# Patient Record
Sex: FEMALE | Race: WHITE | Employment: UNEMPLOYED
[De-identification: names, ages, dates, MRNs, and addresses within clinical notes are randomized per-mention and may not be internally consistent; named-entity substitution may affect disease eponyms.]

---

## 2017-06-10 ENCOUNTER — HEALTH MAINTENANCE LETTER (OUTPATIENT)
Age: 27
End: 2017-06-10

## 2018-01-10 ENCOUNTER — OFFICE VISIT (OUTPATIENT)
Dept: FAMILY MEDICINE | Facility: CLINIC | Age: 28
End: 2018-01-10
Payer: COMMERCIAL

## 2018-01-10 VITALS
TEMPERATURE: 98.1 F | HEART RATE: 84 BPM | DIASTOLIC BLOOD PRESSURE: 60 MMHG | WEIGHT: 120 LBS | SYSTOLIC BLOOD PRESSURE: 102 MMHG | BODY MASS INDEX: 22.66 KG/M2 | HEIGHT: 61 IN | OXYGEN SATURATION: 99 %

## 2018-01-10 DIAGNOSIS — Z30.41 SURVEILLANCE OF PREVIOUSLY PRESCRIBED CONTRACEPTIVE PILL: ICD-10-CM

## 2018-01-10 DIAGNOSIS — E03.9 HYPOTHYROIDISM, UNSPECIFIED TYPE: ICD-10-CM

## 2018-01-10 DIAGNOSIS — Z00.00 ROUTINE GENERAL MEDICAL EXAMINATION AT A HEALTH CARE FACILITY: Primary | ICD-10-CM

## 2018-01-10 PROCEDURE — 84439 ASSAY OF FREE THYROXINE: CPT | Performed by: PHYSICIAN ASSISTANT

## 2018-01-10 PROCEDURE — 99385 PREV VISIT NEW AGE 18-39: CPT | Performed by: PHYSICIAN ASSISTANT

## 2018-01-10 PROCEDURE — 84443 ASSAY THYROID STIM HORMONE: CPT | Performed by: PHYSICIAN ASSISTANT

## 2018-01-10 PROCEDURE — 36415 COLL VENOUS BLD VENIPUNCTURE: CPT | Performed by: PHYSICIAN ASSISTANT

## 2018-01-10 RX ORDER — DESOGESTREL AND ETHINYL ESTRADIOL 21-5 (28)
1 KIT ORAL DAILY
Qty: 84 TABLET | Refills: 3 | Status: SHIPPED | OUTPATIENT
Start: 2018-01-10 | End: 2018-08-27

## 2018-01-10 RX ORDER — LEVOTHYROXINE SODIUM 88 UG/1
88 TABLET ORAL DAILY
Qty: 90 TABLET | Refills: 1 | COMMUNITY
Start: 2018-01-10 | End: 2018-01-10

## 2018-01-10 RX ORDER — LEVOTHYROXINE SODIUM 88 UG/1
88 TABLET ORAL DAILY
Qty: 90 TABLET | Refills: 3 | Status: SHIPPED | OUTPATIENT
Start: 2018-01-10 | End: 2018-08-27

## 2018-01-10 RX ORDER — DESOGESTREL AND ETHINYL ESTRADIOL 21-5 (28)
1 KIT ORAL DAILY
Qty: 84 TABLET | Refills: 3 | COMMUNITY
Start: 2018-01-10 | End: 2018-01-10

## 2018-01-10 NOTE — LETTER
[address] [address]  Phone: [phone]  Fax: [phone]  January 10, 2018     AUTHORIZATION TO RELEASE PROTECTED HEALTH INFORMATION    Patient Name:  Yary Muñoz  YOB: 1990    Sergey MRN:[id number]             This will authorize [facility]  to request information from :     [facility]     The following information is to be released for health maintenance and continuing care purposes with my primary care clinic:                 Pap Smear Report(most recent only)       When: 12/2016    -I understand that I may revoke this authorization by written request at any time to the address listed at the top of this form.  I understand that the revocation will not apply to information that has already been released in response to this authorization.    -This authorization last for one year after the date you sign it.     -[place] cannot prevent redisclosure of the information by the person or organization who receives your records under this authorization, and that information may not be covered by state and federal privacy protections after it is released. By signing this authorization, you release [facility] [place] from any and all liability resulting from a redisclosure by the recipient.    ___________________________________          _____________  Signature of Patient/Authorized Person                     Date        ____________________________________________  (Reason if patient is unable to sign)

## 2018-01-10 NOTE — Clinical Note
Please abstract the following data from this visit with this patient into the appropriate field in Epic:  Pap smear done on this date: 12/2016 (approximately), by this group: [facility], results were normal.  Q3Y

## 2018-01-10 NOTE — MR AVS SNAPSHOT
After Visit Summary   1/10/2018    Yary Muñoz    MRN: [id number]           Patient Information     Date Of Birth          1990        Visit Information        Provider Department      1/10/2018 11:40 AM Rubi Madison PA-C [facility]        Today's Diagnoses     Routine general medical examination at a health care facility    -  1    Hypothyroidism, unspecified type        Surveillance of previously prescribed contraceptive pill          Care Instructions      Preventive Health Recommendations  Female Ages 18 to 25     Yearly exam:     See your health care provider every year in order to  o Review health changes.   o Discuss preventive care.    o Review your medicines if your doctor has prescribed any.      You should be tested each year for STDs (sexually transmitted diseases).       After age 20, talk to your provider about how often you should have cholesterol testing.      Starting at age 21, get a Pap test every three years. If you have an abnormal result, your doctor may have you test more often.      If you are at risk for diabetes, you should have a diabetes test (fasting glucose).     Shots:     Get a flu shot each year.     Get a tetanus shot every 10 years.     Consider getting the shot (vaccine) that prevents cervical cancer (Gardasil).    Nutrition:     Eat at least 5 servings of fruits and vegetables each day.    Eat whole-grain bread, whole-wheat pasta and brown rice instead of white grains and rice.    Talk to your provider about Calcium and Vitamin D.     Lifestyle    Exercise at least 150 minutes a week each week (30 minutes a day, 5 days a week). This will help you control your weight and prevent disease.    Limit alcohol to one drink per day.    No smoking.     Wear sunscreen to prevent skin cancer.    See your dentist every six months for an exam and cleaning.          Follow-ups after your visit        Who to contact     If you have questions or need  "follow up information about today's clinic visit or your schedule please contact [facility] directly at [phone].  Normal or non-critical lab and imaging results will be communicated to you by MyChart, letter or phone within 4 business days after the clinic has received the results. If you do not hear from us within 7 days, please contact the clinic through Sidensehart or phone. If you have a critical or abnormal lab result, we will notify you by phone as soon as possible.  Submit refill requests through Grockit or call your pharmacy and they will forward the refill request to us. Please allow 3 business days for your refill to be completed.          Additional Information About Your Visit        SidenseharPalamida Information     Grockit lets you send messages to your doctor, view your test results, renew your prescriptions, schedule appointments and more. To sign up, go to [email or web address] . Click on \"Log in\" on the left side of the screen, which will take you to the Welcome page. Then click on \"Sign up Now\" on the right side of the page.     You will be asked to enter the access code listed below, as well as some personal information. Please follow the directions to create your username and password.     Your access code is: [email or web address]  Expires: 4/10/2018 11:59 AM     Your access code will  in 90 days. If you need help or a new code, please call your [facility] clinic or [phone].        Care EveryWhere ID     This is your Care EveryWhere ID. This could be used by other organizations to access your [facility] medical records  [id number]        Your Vitals Were     Pulse Temperature Height Last Period Pulse Oximetry Breastfeeding?    84 98.1  F (36.7  C) (Oral) 5' 1\" (1.549 m) 2018 (Exact Date) 99% No    BMI (Body Mass Index)                   22.67 kg/m2            Blood Pressure from Last 3 Encounters:   01/10/18 102/60    Weight from Last 3 Encounters:   01/10/18 120 lb (54.4 kg)    "           We Performed the Following     TSH with free T4 reflex          Where to get your medicines      These medications were sent to [facility] - [place], [place] - [address] AT [facility]  [address], [place] [address]     Phone:  [phone]     desogestrel-ethinyl estradiol 0.15-0.02/0.01 MG (21/5) per tablet    levothyroxine 88 MCG tablet          Primary Care Provider Office Phone # Fax #    Rubi Madison PA-C [phone]       [address] [address]        Equal Access to Services     TYRONE URIBE : Houston Bowden, wachristopher solorio, qasvetata kaalmada justina, gaudencio webb. [facility] [phone].    ATENCIÓN: Si habla español, tiene a rivers disposición servicios gratuitos de asistencia lingüística. Llame al [phone].    We comply with applicable federal civil rights laws and [place] laws. We do not discriminate on the basis of race, color, national origin, age, disability, sex, sexual orientation, or gender identity.            Thank you!     Thank you for choosing [facility]  for your care. Our goal is always to provide you with excellent care. Hearing back from our patients is one way we can continue to improve our services. Please take a few minutes to complete the written survey that you may receive in the mail after your visit with us. Thank you!             Your Updated Medication List - Protect others around you: Learn how to safely use, store and throw away your medicines at www.disposemymeds.org.          This list is accurate as of: 1/10/18 11:59 AM.  Always use your most recent med list.                   Brand Name Dispense Instructions for use Diagnosis    desogestrel-ethinyl estradiol 0.15-0.02/0.01 MG (21/5) per tablet    KARIVA    84 tablet    Take 1 tablet by mouth daily    Surveillance of previously  prescribed contraceptive pill       levothyroxine 88 MCG tablet    SYNTHROID/LEVOTHROID    90 tablet    Take 1 tablet (88 mcg) by mouth daily    Hypothyroidism, unspecified type

## 2018-01-10 NOTE — PROGRESS NOTES
SUBJECTIVE:   CC: Yary Muñoz is an 27 year old woman who presents for preventive health visit.     Healthy Habits:    Do you get at least three servings of calcium containing foods daily (dairy, green leafy vegetables, etc.)? yes    Amount of exercise or daily activities, outside of work: 3 day(s) per week    Problems taking medications regularly No    Medication side effects: No    Have you had an eye exam in the past two years? no    Do you see a dentist twice per year? yes    Do you have sleep apnea, excessive snoring or daytime drowsiness?no      Hypothyroidism Follow-up  Yary is prescribed levothyroxine 88 mcg to treat hypothyroidism. She has been treated for hypothyroidism for approximately 4 years and has a family history of hypothyroidism in both her parents and a brother. Her initial symptoms included irregular periods and weight fluctuations.      Since last visit, patient describes the following symptoms: Weight stable, no hair loss, no skin changes, no constipation, no loose stools    Today's PHQ-2 Score: No flowsheet data found. 0-0      Abuse: Current or Past(Physical, Sexual or Emotional)- No  Do you feel safe in your environment - Yes  Social History   Substance Use Topics     Smoking status: Never Smoker     Smokeless tobacco: Never Used     Alcohol use Yes      Comment: 1-2 glassesof wine per month     If you drink alcohol do you typically have >3 drinks per day or >7 drinks per week? No                     Reviewed orders with patient.  Reviewed health maintenance and updated orders accordingly - Yes  Patient Active Problem List   Diagnosis     Hypothyroidism, unspecified type     Surveillance of previously prescribed contraceptive pill     Past Surgical History:   Procedure Laterality Date     [facility]         Social History   Substance Use Topics     Smoking status: Never Smoker     Smokeless tobacco: Never Used     Alcohol use Yes      Comment: 1-2 glassesof wine per month      "Family History   Problem Relation Age of Onset     Thyroid Disease Mother      hypothyroid     Thyroid Disease Father      hypothyroid     Other Cancer Maternal Grandfather      lung     Other Cancer Paternal Grandfather      skin     Breast Cancer Other      great aunt     Breast Cancer Maternal Aunt      early 40\"s     Hypothyroidism Brother          Current Outpatient Prescriptions   Medication Sig Dispense Refill     desogestrel-ethinyl estradiol (KARIVA) 0.15-0.02/0.01 MG (21/5) per tablet Take 1 tablet by mouth daily 84 tablet 3     levothyroxine (SYNTHROID/LEVOTHROID) 88 MCG tablet Take 1 tablet (88 mcg) by mouth daily 90 tablet 3     [DISCONTINUED] levothyroxine (SYNTHROID/LEVOTHROID) 88 MCG tablet Take 1 tablet (88 mcg) by mouth daily 90 tablet 1     [DISCONTINUED] desogestrel-ethinyl estradiol (KARIVA) 0.15-0.02/0.01 MG (21/5) per tablet Take 1 tablet by mouth daily 84 tablet 3     No Known Allergies      Mammogram not appropriate for this patient based on age.    Pertinent mammograms are reviewed under the imaging tab.  History of abnormal Pap smear: NO - age 21-29 PAP every 3 years recommended  Last 3 Pap Results: No results found for: PAP    Reviewed and updated as needed this visit by clinical staff  Tobacco  Allergies  Meds  Problems  Med Hx  Surg Hx  Fam Hx  Soc Hx        Reviewed and updated as needed this visit by Provider  Tobacco  Allergies  Meds  Problems  Med Hx  Surg Hx  Fam Hx  Soc Hx         Past Medical History:   Diagnosis Date     Hypothyroid 2013    irregular period, weight gain, family history      Past Surgical History:   Procedure Laterality Date     [facility]         ROS:  C: NEGATIVE for fever, chills, change in weight  I: NEGATIVE for worrisome rashes, moles or lesions  E: NEGATIVE for vision changes or irritation  ENT: NEGATIVE for ear, mouth and throat problems  R: NEGATIVE for significant cough or SOB  B: NEGATIVE for masses, tenderness or discharge  CV: " "NEGATIVE for chest pain, palpitations or peripheral edema  GI: NEGATIVE for nausea, abdominal pain, heartburn, or change in bowel habits  : NEGATIVE for unusual urinary or vaginal symptoms. Periods are regular.  M: NEGATIVE for significant arthralgias or myalgia  N: NEGATIVE for weakness, dizziness or paresthesias  P: NEGATIVE for changes in mood or affect    This document serves as a record of the services and decisions personally performed and made by Rubi Madison PA-C. It was created on her behalf by Jose Hou, a trained medical scribe. The creation of this document is based on the provider's statements to the medical scribe.  Jose Hou 11:48 AM January 10, 2018    OBJECTIVE:   /60 (BP Location: Right arm, Patient Position: Chair, Cuff Size: Adult Regular)  Pulse 84  Temp 98.1  F (36.7  C) (Oral)  Ht 5' 1\" (1.549 m)  Wt 120 lb (54.4 kg)  LMP 01/01/2018 (Exact Date)  SpO2 99%  Breastfeeding? No  BMI 22.67 kg/m2  EXAM:  GENERAL: healthy, alert and no distress  EYES: Eyes grossly normal to inspection, PERRL and conjunctivae and sclerae normal  HENT: ear canals and TM's normal, nose and mouth without ulcers or lesions  NECK: no adenopathy, no asymmetry, masses, or scars and thyroid normal to palpation  RESP: lungs clear to auscultation - no rales, rhonchi or wheezes  BREAST: normal without masses, tenderness or nipple discharge and no palpable axillary masses or adenopathy  CV: regular rate and rhythm, normal S1 S2, no S3 or S4, no murmur, click or rub, no peripheral edema and peripheral pulses strong  ABDOMEN: soft, nontender, no hepatosplenomegaly, no masses and bowel sounds normal  MS: no gross musculoskeletal defects noted, no edema  SKIN: no suspicious lesions or rashes  NEURO: Normal strength and tone, mentation intact and speech normal  PSYCH: mentation appears normal, affect normal/bright    ASSESSMENT/PLAN:   Yary was seen today for new patient and " "physical.    Diagnoses and all orders for this visit:    Routine general medical examination at a health care facility    Hypothyroidism, unspecified type  Stable, patient doing well. Continue current dosage of levothyroxine to treat hypothyroidism. Will monitor TSH to monitor if dosage change is necessary.  -     TSH with free T4 reflex  -     levothyroxine (SYNTHROID/LEVOTHROID) 88 MCG tablet; Take 1 tablet (88 mcg) by mouth daily    Surveillance of previously prescribed contraceptive pill  Stable, patient doing well. Continue Kariva for methods of birth control.  -     desogestrel-ethinyl estradiol (KARIVA) 0.15-0.02/0.01 MG (21/5) per tablet; Take 1 tablet by mouth daily    COUNSELING:   Reviewed preventive health counseling, as reflected in patient instructions       Regular exercise       Healthy diet/nutrition       Contraception     reports that she has never smoked. She has never used smokeless tobacco.    Estimated body mass index is 22.67 kg/(m^2) as calculated from the following:    Height as of this encounter: 5' 1\" (1.549 m).    Weight as of this encounter: 120 lb (54.4 kg).     Counseling Resources:  ATP IV Guidelines  Pooled Cohorts Equation Calculator  Breast Cancer Risk Calculator  FRAX Risk Assessment  ICSI Preventive Guidelines  Dietary Guidelines for Americans, 2010  USDA's MyPlate  ASA Prophylaxis  Lung CA Screening    The information in this document, created by the medical scribe for me, accurately reflects the services I personally performed and the decisions made by me. I have reviewed and approved this document for accuracy prior to leaving the patient care area.  January 10, 2018 11:48 AM    Rubi Madison PA-C  [facility]  [place]  "

## 2018-01-10 NOTE — NURSING NOTE
"Chief Complaint   Patient presents with     New Patient     Physical       Initial /60 (BP Location: Right arm, Patient Position: Chair, Cuff Size: Adult Regular)  Pulse 84  Temp 98.1  F (36.7  C) (Oral)  Ht 5' 1\" (1.549 m)  Wt 120 lb (54.4 kg)  LMP 01/01/2018 (Exact Date)  SpO2 99%  Breastfeeding? No  BMI 22.67 kg/m2 Estimated body mass index is 22.67 kg/(m^2) as calculated from the following:    Height as of this encounter: 5' 1\" (1.549 m).    Weight as of this encounter: 120 lb (54.4 kg).  Medication Reconciliation: complete   Csaba Mlnarik CMA    "

## 2018-01-11 LAB
T4 FREE SERPL-MCNC: 1.22 NG/DL (ref 0.76–1.46)
TSH SERPL DL<=0.005 MIU/L-ACNC: 6.33 MU/L (ref 0.4–4)

## 2018-01-11 NOTE — PROGRESS NOTES
Yary  I have reviewed your recent labs. Here are the results:    -TSH (thyroid stimulating hormone) level is elevated, however, your T4 (thyroid hormone) is normal which indicates appropriate thyroid replacement dosing.  ADVISE: continuing same replacement dose and recheck in 12 months (TSH w/ T4 reflex, DX: hypothyroidism.)  If you develop symptoms of fatigue, weight gain, hair or skin changes please contact the clinic and we will redraw your labs to see if this has changed and you need a dosage adjustment.     If you have any questions please do not hesitate to contact our office via phone ([phone]) or WibiDatahart.    Rubi Madison, MS, PA-C  [facility] [place]

## 2018-08-22 DIAGNOSIS — E03.9 HYPOTHYROIDISM, UNSPECIFIED TYPE: ICD-10-CM

## 2018-08-22 RX ORDER — LEVOTHYROXINE SODIUM 88 UG/1
88 TABLET ORAL DAILY
Qty: 90 TABLET | Refills: 3 | Status: CANCELLED | OUTPATIENT
Start: 2018-08-22

## 2018-08-22 NOTE — TELEPHONE ENCOUNTER
levothyroxine (SYNTHROID/LEVOTHROID) 88 MCG tablet    Last Written Prescription Date:  1.10.18  Last Fill Quantity: 90,  # refills: 3   Last Office Visit: 1/10/2018   Future Office Visit:    Next 5 appointments (look out 90 days)     Sep 20, 2018  9:00 AM CDT   New Prenatal with RI PRENATAL NURSE   [facility]    [address]  [facility] [address]   [phone]            Sep 26, 2018  8:00 AM CDT   New Prenatal with CHALINO Chicas CNM   [facility]    [address]  [address]  [facility] [address]   [phone]

## 2018-08-23 NOTE — TELEPHONE ENCOUNTER
Left message to return my call to the [facility] at both the home and cell phone numbers.  Tsering Downey M.A.

## 2018-08-23 NOTE — TELEPHONE ENCOUNTER
Please reach out to the patient to help schedule an appointment with one of us - You can use a 30 minute spot for her or if there isn't anything within the next week, let me know. You could schedule her at 4:45 next Wednesday.  You can also check Dr. Zuniga's schedule but likely she has not opening.    Azalea Burk NP  Endocrinology

## 2018-08-23 NOTE — TELEPHONE ENCOUNTER
Encounter was routed to [facility] - are you guys able to schedule patient? I'm unsure how to schedule on your schedule.     Alma Whitlock RN  [facility]

## 2018-08-23 NOTE — TELEPHONE ENCOUNTER
"Routing refill request to provider for review/approval because:  Labs out of range:  TSH 6.33    Requested Prescriptions   Pending Prescriptions Disp Refills     levothyroxine (SYNTHROID/LEVOTHROID) 88 MCG tablet 90 tablet 3    Last Written Prescription Date:  1/10/2018  Last Fill Quantity: 90,  # refills: 3   Last office visit: 1/10/2018 with prescribing provider: Los Mccallum Office Visit:   Next 5 appointments (look out 90 days)     Sep 20, 2018  9:00 AM CDT   New Prenatal with RI PRENATAL NURSE   [facility]    [address] [facility] [place] [address]   [phone]            Sep 26, 2018  8:00 AM CDT   New Prenatal with CHALINO Chicas CNM   [facility]    [address]  [address]  [facility] [address]   [phone]                  Sig: Take 1 tablet (88 mcg) by mouth daily    Thyroid Protocol Failed    8/22/2018 11:56 AM       Failed - Normal TSH on file in past 12 months    Recent Labs   Lab Test  01/10/18   1205   TSH  6.33*             Passed - Patient is 12 years or older       Passed - Recent (12 mo) or future (30 days) visit within the authorizing provider's specialty    Patient had office visit in the last 12 months or has a visit in the next 30 days with authorizing provider or within the authorizing provider's specialty.  See \"Patient Info\" tab in inbasket, or \"Choose Columns\" in Meds & Orders section of the refill encounter.           Passed - No active pregnancy on record    If patient is pregnant or has had a positive pregnancy test, please check TSH.         Passed - No positive pregnancy test in past 12 months    If patient is pregnant or has had a positive pregnancy test, please check TSH.          Alma Coker RN  [facility]          "

## 2018-08-23 NOTE — TELEPHONE ENCOUNTER
Pt should have enough refills to last until 1/2019.  However, her last TSH was elevated and I see that she is currently pregnant.  She needs to establish with endocrinology to manage her thyroid during her pregnancy as this must be optimized to decrease the chance of complications during her pregnancy.    Please place referral for Azalea Burk NP in [place] endocrinology for mgmt while pregnant.  Alternatively, she could see Dr. Zuniga in [place] or [place].      Rubi Madison MS, PA-C

## 2018-08-24 NOTE — TELEPHONE ENCOUNTER
Pt returned call and scheduled an appt for Friday 08/31/2018 @8AM, per LS, could be 30 minutes vs 60 minutes    Rowena Balderas/MEREDITH  [place]

## 2018-08-27 ENCOUNTER — TELEPHONE (OUTPATIENT)
Dept: FAMILY MEDICINE | Facility: CLINIC | Age: 28
End: 2018-08-27

## 2018-08-27 DIAGNOSIS — Z30.41 SURVEILLANCE OF PREVIOUSLY PRESCRIBED CONTRACEPTIVE PILL: ICD-10-CM

## 2018-08-27 DIAGNOSIS — E03.9 HYPOTHYROIDISM, UNSPECIFIED TYPE: ICD-10-CM

## 2018-08-27 RX ORDER — LEVOTHYROXINE SODIUM 88 UG/1
88 TABLET ORAL DAILY
Qty: 90 TABLET | Refills: 1 | Status: SHIPPED | OUTPATIENT
Start: 2018-08-27 | End: 2018-09-04

## 2018-08-27 RX ORDER — DESOGESTREL AND ETHINYL ESTRADIOL 21-5 (28)
1 KIT ORAL DAILY
Qty: 84 TABLET | Refills: 1 | Status: SHIPPED | OUTPATIENT
Start: 2018-08-27 | End: 2018-08-31

## 2018-08-27 NOTE — TELEPHONE ENCOUNTER
Order for medications on file sent to [facility].    Gunjan Felix, ALEX, RN, N  [facility]) [phone]

## 2018-08-27 NOTE — TELEPHONE ENCOUNTER
Name of caller: Saul  Relationship of Patient: CHANDAN Mckeon    Reason for Call: PT wants her RX sent to [facility] and they are requesting we send them a new RX directly to them instead of going through mail order.     Best phone number to reach pt at is: [phone]  Ok to leave a message with medical info? Yes    Pharmacy preferred (if calling for a refill): CHANDAN Neal  Patient Representative - [facility]

## 2018-08-31 ENCOUNTER — OFFICE VISIT (OUTPATIENT)
Dept: ENDOCRINOLOGY | Facility: CLINIC | Age: 28
End: 2018-08-31
Payer: COMMERCIAL

## 2018-08-31 VITALS
BODY MASS INDEX: 22.09 KG/M2 | HEIGHT: 61 IN | HEART RATE: 66 BPM | SYSTOLIC BLOOD PRESSURE: 100 MMHG | WEIGHT: 117 LBS | DIASTOLIC BLOOD PRESSURE: 70 MMHG

## 2018-08-31 DIAGNOSIS — O99.281 HYPOTHYROIDISM AFFECTING PREGNANCY IN FIRST TRIMESTER: Primary | ICD-10-CM

## 2018-08-31 DIAGNOSIS — R79.89 ELEVATED TSH: ICD-10-CM

## 2018-08-31 DIAGNOSIS — E03.9 HYPOTHYROIDISM AFFECTING PREGNANCY IN FIRST TRIMESTER: Primary | ICD-10-CM

## 2018-08-31 LAB
T4 FREE SERPL-MCNC: 1.29 NG/DL (ref 0.76–1.46)
TSH SERPL DL<=0.005 MIU/L-ACNC: 3.86 MU/L (ref 0.4–4)

## 2018-08-31 PROCEDURE — 86800 THYROGLOBULIN ANTIBODY: CPT | Performed by: CLINICAL NURSE SPECIALIST

## 2018-08-31 PROCEDURE — 99204 OFFICE O/P NEW MOD 45 MIN: CPT | Performed by: CLINICAL NURSE SPECIALIST

## 2018-08-31 PROCEDURE — 36415 COLL VENOUS BLD VENIPUNCTURE: CPT | Performed by: CLINICAL NURSE SPECIALIST

## 2018-08-31 PROCEDURE — 84439 ASSAY OF FREE THYROXINE: CPT | Performed by: CLINICAL NURSE SPECIALIST

## 2018-08-31 PROCEDURE — 86376 MICROSOMAL ANTIBODY EACH: CPT | Performed by: CLINICAL NURSE SPECIALIST

## 2018-08-31 PROCEDURE — 84443 ASSAY THYROID STIM HORMONE: CPT | Performed by: CLINICAL NURSE SPECIALIST

## 2018-08-31 RX ORDER — LEVOTHYROXINE SODIUM 100 MCG
100 TABLET ORAL DAILY
Qty: 30 TABLET | Refills: 3 | Status: SHIPPED | OUTPATIENT
Start: 2018-08-31 | End: 2018-10-04 | Stop reason: DRUGHIGH

## 2018-08-31 NOTE — LETTER
"    8/31/2018         RE: Yary Muñoz  [address] [facility] [address]        Dear Colleague,    Thank you for referring your patient, Yary Muñoz, to the [facility]. Please see a copy of my visit note below.    Duplicate note    Name: Yary Muñoz  Seen at the request of No ref. provider found for   Chief Complaint   Patient presents with     Endocrine Problem     primary ref, thyroid in pregnancy      HPI:  Yary Muñoz is a 27 year old female who presents for the evaluation of hypothyroidism.  Hypothyroidism was diagnosed about 5 years ago - was noting fatigue and hair loss at the time.  Hypothyroidism currently treated with levothyroxine 88 mcg/day.  Believes she has been treated with this dose since diagnosis.  Pregnant. LMP: 7/17/2018.  Has not seen OBGYN yet.  Recent TFT's significant for elevated TSH.  She denies any hypothyroid symptoms.      Palpitations:  No  Changes to hair or skin: No0  Diarrhea/Constipation:No  Muscle aches or pain: No  Difficulty sleeping: No  Changes in weight: No  Heat or cold intolerance: No  PMH/PSH:  Past Medical History:   Diagnosis Date     Hypothyroid 2013    irregular period, weight gain, family history     Past Surgical History:   Procedure Laterality Date     [facility]       Family Hx:  Family History   Problem Relation Age of Onset     Thyroid Disease Mother      hypothyroid     Thyroid Disease Father      hypothyroid     Other Cancer Maternal Grandfather      lung     Other Cancer Paternal Grandfather      skin     Breast Cancer Other      great aunt     Breast Cancer Maternal Aunt      early 40\"s     Hypothyroidism Brother      Thyroid disease:  Yes, mother, father, brother, MGM, PGM       DM2:          Autoimmune: DM1, SLE, RA, Vitiligo     Social Hx:  Social History     Social History     Marital status:      Spouse name: Woody     Number of children: 0     Years of education: N/A     Occupational History     RN      [employer] " "    Social History Main Topics     Smoking status: Never Smoker     Smokeless tobacco: Never Used     Alcohol use Yes      Comment: 1-2 glassesof wine per month     Drug use: No     Sexual activity: Yes     Partners: Male     Birth control/ protection: Pill     Other Topics Concern     Not on file     Social History Narrative          MEDICATIONS:  has a current medication list which includes the following prescription(s): levothyroxine and synthroid.    ROS   ROS: 10 point ROS neg other than the symptoms noted above in the HPI.    Physical Exam   VS: /70  Pulse 66  Ht 1.549 m (5' 1\")  Wt 53.1 kg (117 lb)  LMP 01/01/2018 (Exact Date)  BMI 22.11 kg/m2  GENERAL: AXOX3, NAD, well dressed, answering questions appropriately, appears stated age.  HEENT: no exopthalmous, no proptosis, EOMI, no lig lag, no retraction  NECK:  Supple, thyroid palpably normal, no adenopathy  CV: RRR, no rubs, gallops, no murmurs  LUNGS: CTAB, no wheezes, rales, or ronchi  EXTREMITIES: no edema, +pulses, no rashes, no lesions  NEUROLOGY: CN grossly intact  , no tremors  MSK: grossly intact  SKIN: no rashes, no lesions    LABS:  TFTs:  !THYROID Latest Ref Rng & Units 1/10/2018   TSH 0.40 - 4.00 mU/L 6.33 (H)   T4 FREE 0.76 - 1.46 ng/dL 1.22     TG/TPO:    All pertinent notes, labs, and images personally reviewed by me.     A/P  Ms.Jamee Muñoz is a 27 year old here for the evaluation of hypothyroidism:    1. Hypothyroidism.  Differential includes: autoimmune disease (Hashimoto's thyroiditis), treatment for hyperthyroidism, radiation therapy, thyroid surgery, medications, congenital disease, pituitary disorder, pregnancy, and iodine deficiency.  Persons with Hashimoto's thyroiditis have serum antibodies reacting with TG, TPO, and against an unidentified protein present in colloid.     Standard treatment for hypothyroidism involves daily use of the synthetic thyroid hormone levothyroxine (Levothroid, Synthroid, others).  The dosage of " thyroxine should normally be that required to bring the serum TSH level to the low normal range, such as 0.3 - 1 uU/ml. This is typically achieved with 1 ug L-T4/lb body weight/day, ranges from 75 - 125 ug/day in women, and 125 - 200 ug/day in men. Once thyroxine treatment is initiated, it is required indefinitely in most patients.     Symptoms should improve one to two weeks after starting treatment. Treatment with levothyroxine is usually lifelong.  Doseage may need to be adjusted based on body weight, medications, or pregnancy.  To determine the right dosage of levothyroxine initially we will repeat TSH and free T4 after two months.    Excessive amounts of the hormone can cause side effects, such as: Increased appetite, insomnia, heart palpitations, and shakiness.  Patients with CAD will be started on a lower dose.  Levothyroxine causes virtually no side effects when used in the appropriate dose.    Certain medications, supplements and foods can affect the body s ability to absorb levothyroxine. Medications include: Iron supplements, Cholestyramine and Calcium supplements.     Patient was advised that decreased absorption of levothyroxine might occur if taken concomitantly with food or within four hours of taking calcium, iron, soy or aluminum containing antacids. Generic substitution for brand name and vice versa, or substitution of one generic formulation for another may cause abnormal TSH levels on a previously stable dose of thyroid hormone. Pt was advised that regular monitoring of thyroid function, as prescribed by the physician, and adherence to dose prescribed, is important.    Change to name brand Synthroid and increase dose to 100 mcg/day.  Will obtain TFTs and thyroid antibodies today  TFT's again in 4 weeks      Labs ordered today:   Orders Placed This Encounter   Procedures     TSH     T4 FREE     Thyroid peroxidase antibody     Anti thyroglobulin antibody     TSH     T4 FREE       More than 50% of  the time spent with Ms. Muñoz on counseling / coordinating her care.  Total face to face time was greater than or equal to 30 minutes.      Follow-up:  Monthly labs  F/u in clinic in the third trimester to discuss postdelivery LT4 replacement recommendations    Azalea Burk NP  Endocrinology  [facility]  CC:       Again, thank you for allowing me to participate in the care of your patient.        Sincerely,        CHALINO Alston CNP

## 2018-08-31 NOTE — PATIENT INSTRUCTIONS
Change to name brand Synthroid, increase to 100 mcg per day.    Labs every month - first lab should be about 9/28 or 10/1.    I'll message you with results and if you need to change the dose     Make a follow up appointment in the third trimester to discuss adjustments following deliver.    Let me know if you have any questions or new symptoms.    Azalea Burk NP  Endocrinology

## 2018-08-31 NOTE — MR AVS SNAPSHOT
After Visit Summary   8/31/2018    Yary Muñoz    MRN: [id number]           Patient Information     Date Of Birth          1990        Visit Information        Provider Department      8/31/2018 8:00 AM Azalea Burk APRN CNP [facility]        Today's Diagnoses     Hypothyroidism affecting pregnancy in first trimester    -  1      Care Instructions        Change to name brand Synthroid, increase to 100 mcg per day.    Labs every month - first lab should be about 9/28 or 10/1.    I'll message you with results and if you need to change the dose     Make a follow up appointment in the third trimester to discuss adjustments following deliver.    Let me know if you have any questions or new symptoms.    Azalea Burk NP  Endocrinology            Follow-ups after your visit        Your next 10 appointments already scheduled     Sep 20, 2018  9:00 AM CDT   New Prenatal with RI PRENATAL NURSE   [facility])    [address] [place] [address]   [phone]            Sep 20, 2018 10:30 AM CDT   [facility]    [address]  [address] [place] [id number] [phone]            Sep 26, 2018  8:00 AM CDT   New Prenatal with CHALINO Chicas CNM [facility] [place] [facility] [place] [address] [place] [id number] [phone]              Who to contact     If you have questions or need follow up information about today's clinic visit or your schedule please contact [facility] directly at [phone].  Normal or non-critical lab and imaging results will be communicated to you by MyChart, letter or phone within 4 business days after the clinic has received the results. If you do not hear from us within 7 days, please contact the clinic through  "[employer] or phone. If you have a critical or abnormal lab result, we will notify you by phone as soon as possible.  Submit refill requests through [employer] or call your pharmacy and they will forward the refill request to us. Please allow 3 business days for your refill to be completed.          Additional Information About Your Visit        [employer] Information     [employer] gives you secure access to your electronic health record. If you see a primary care provider, you can also send messages to your care team and make appointments. If you have questions, please call your primary care clinic.  If you do not have a primary care provider, please call [phone] and they will assist you.        Care EveryWhere ID     This is your Care EveryWhere ID. This could be used by other organizations to access your [place] medical records  [id number]        Your Vitals Were     Pulse Height Last Period BMI (Body Mass Index)          66 1.549 m (5' 1\") 01/01/2018 (Exact Date) 22.11 kg/m2         Blood Pressure from Last 3 Encounters:   08/31/18 100/70   01/10/18 102/60    Weight from Last 3 Encounters:   08/31/18 53.1 kg (117 lb)   01/10/18 54.4 kg (120 lb)              We Performed the Following     Anti thyroglobulin antibody     T4 FREE     Thyroid peroxidase antibody     TSH          Today's Medication Changes          These changes are accurate as of 8/31/18  8:22 AM.  If you have any questions, ask your nurse or doctor.               These medicines have changed or have updated prescriptions.        Dose/Directions    * levothyroxine 88 MCG tablet   Commonly known as:  SYNTHROID/LEVOTHROID   This may have changed:  Another medication with the same name was added. Make sure you understand how and when to take each.   Used for:  Hypothyroidism, unspecified type   Changed by:  Azalea Burk APRN CNP        Dose:  88 mcg   Take 1 tablet (88 mcg) by mouth daily   Quantity:  90 tablet   Refills:  1       * SYNTHROID 100 " MCG tablet   This may have changed:  You were already taking a medication with the same name, and this prescription was added. Make sure you understand how and when to take each.   Used for:  Hypothyroidism affecting pregnancy in first trimester   Generic drug:  levothyroxine   Changed by:  Azalea Burk APRN CNP        Dose:  100 mcg   Take 1 tablet (100 mcg) by mouth daily Dispense name brand Synthroid, no generics   Quantity:  30 tablet   Refills:  3       * Notice:  This list has 2 medication(s) that are the same as other medications prescribed for you. Read the directions carefully, and ask your doctor or other care provider to review them with you.         Where to get your medicines      These medications were sent to [facility] [id number] [facility] - [place], [place] - [address]  [address] [facility] [address], [place] [address]     Phone:  [phone]     SYNTHROID 100 MCG tablet                Primary Care Provider Office Phone # Fax #    Rubi Madison PA-C [phone]       [address] [place] [address]        Equal Access to Services     [facility]: Hadii ilia ku hadasho Soomaali, waaxda luqadaha, qaybta kaalmada adeegyada, waxay inez marshall . So [facility] [phone].    ATENCIÓN: Si habla español, tiene a rivers disposición servicios gratuitos de asistencia lingüística. Llame al [phone].    We comply with applicable federal civil rights laws and [place] laws. We do not discriminate on the basis of race, color, national origin, age, disability, sex, sexual orientation, or gender identity.            Thank you!     Thank you for choosing [facility]  for your care. Our goal is always to provide you with excellent care. Hearing back from our patients is one way we can continue to improve our services. Please take a few minutes to complete the written survey that you may receive in the mail after your visit with us. Thank you!             Your  Updated Medication List - Protect others around you: Learn how to safely use, store and throw away your medicines at www.disposemymeds.org.          This list is accurate as of 8/31/18  8:22 AM.  Always use your most recent med list.                   Brand Name Dispense Instructions for use Diagnosis    * levothyroxine 88 MCG tablet    SYNTHROID/LEVOTHROID    90 tablet    Take 1 tablet (88 mcg) by mouth daily    Hypothyroidism, unspecified type       * SYNTHROID 100 MCG tablet   Generic drug:  levothyroxine     30 tablet    Take 1 tablet (100 mcg) by mouth daily Dispense name brand Synthroid, no generics    Hypothyroidism affecting pregnancy in first trimester       * Notice:  This list has 2 medication(s) that are the same as other medications prescribed for you. Read the directions carefully, and ask your doctor or other care provider to review them with you.

## 2018-09-04 ENCOUNTER — TELEPHONE (OUTPATIENT)
Dept: ENDOCRINOLOGY | Facility: CLINIC | Age: 28
End: 2018-09-04

## 2018-09-04 LAB
THYROGLOB AB SERPL IA-ACNC: <20 IU/ML (ref 0–40)
THYROPEROXIDASE AB SERPL-ACNC: 103 IU/ML

## 2018-09-04 NOTE — PROGRESS NOTES
"Name: Yary Muñoz  Seen at the request of No ref. provider found for   Chief Complaint   Patient presents with     Endocrine Problem     primary ref, thyroid in pregnancy      HPI:  Yary Muñoz is a 27 year old female who presents for the evaluation of hypothyroidism.  Hypothyroidism was diagnosed about 5 years ago - was noting fatigue and hair loss at the time.  Hypothyroidism currently treated with levothyroxine 88 mcg/day.  Believes she has been treated with this dose since diagnosis.  Pregnant. LMP: 7/17/2018.  Has not seen OBGYN yet.  Recent TFT's significant for elevated TSH.  She denies any hypothyroid symptoms.      Palpitations:  No  Changes to hair or skin: No0  Diarrhea/Constipation:No  Muscle aches or pain: No  Difficulty sleeping: No  Changes in weight: No  Heat or cold intolerance: No  PMH/PSH:  Past Medical History:   Diagnosis Date     Hypothyroid 2013    irregular period, weight gain, family history     Past Surgical History:   Procedure Laterality Date     [facility]       Family Hx:  Family History   Problem Relation Age of Onset     Thyroid Disease Mother      hypothyroid     Thyroid Disease Father      hypothyroid     Other Cancer Maternal Grandfather      lung     Other Cancer Paternal Grandfather      skin     Breast Cancer Other      great aunt     Breast Cancer Maternal Aunt      early 40\"s     Hypothyroidism Brother      Thyroid disease:  Yes, mother, father, brother, MGM, PGM       DM2:          Autoimmune: DM1, SLE, RA, Vitiligo     Social Hx:  Social History     Social History     Marital status:      Spouse name: Woody     Number of children: 0     Years of education: N/A     Occupational History     RN      [employer]     Social History Main Topics     Smoking status: Never Smoker     Smokeless tobacco: Never Used     Alcohol use Yes      Comment: 1-2 glassesof wine per month     Drug use: No     Sexual activity: Yes     Partners: Male     Birth control/ protection: Pill " "    Other Topics Concern     Not on file     Social History Narrative          MEDICATIONS:  has a current medication list which includes the following prescription(s): levothyroxine and synthroid.    ROS   ROS: 10 point ROS neg other than the symptoms noted above in the HPI.    Physical Exam   VS: /70  Pulse 66  Ht 1.549 m (5' 1\")  Wt 53.1 kg (117 lb)  LMP 01/01/2018 (Exact Date)  BMI 22.11 kg/m2  GENERAL: AXOX3, NAD, well dressed, answering questions appropriately, appears stated age.  HEENT: no exopthalmous, no proptosis, EOMI, no lig lag, no retraction  NECK:  Supple, thyroid palpably normal, no adenopathy  CV: RRR, no rubs, gallops, no murmurs  LUNGS: CTAB, no wheezes, rales, or ronchi  EXTREMITIES: no edema, +pulses, no rashes, no lesions  NEUROLOGY: CN grossly intact  , no tremors  MSK: grossly intact  SKIN: no rashes, no lesions    LABS:  TFTs:  !THYROID Latest Ref Rng & Units 1/10/2018   TSH 0.40 - 4.00 mU/L 6.33 (H)   T4 FREE 0.76 - 1.46 ng/dL 1.22     TG/TPO:    All pertinent notes, labs, and images personally reviewed by me.     A/P  Ms.Jamee Muñoz is a 27 year old here for the evaluation of hypothyroidism:    1. Hypothyroidism.  Differential includes: autoimmune disease (Hashimoto's thyroiditis), treatment for hyperthyroidism, radiation therapy, thyroid surgery, medications, congenital disease, pituitary disorder, pregnancy, and iodine deficiency.  Persons with Hashimoto's thyroiditis have serum antibodies reacting with TG, TPO, and against an unidentified protein present in colloid.     Standard treatment for hypothyroidism involves daily use of the synthetic thyroid hormone levothyroxine (Levothroid, Synthroid, others).  The dosage of thyroxine should normally be that required to bring the serum TSH level to the low normal range, such as 0.3 - 1 uU/ml. This is typically achieved with 1 ug L-T4/lb body weight/day, ranges from 75 - 125 ug/day in women, and 125 - 200 ug/day in men. Once " thyroxine treatment is initiated, it is required indefinitely in most patients.     Symptoms should improve one to two weeks after starting treatment. Treatment with levothyroxine is usually lifelong.  Doseage may need to be adjusted based on body weight, medications, or pregnancy.  To determine the right dosage of levothyroxine initially we will repeat TSH and free T4 after two months.    Excessive amounts of the hormone can cause side effects, such as: Increased appetite, insomnia, heart palpitations, and shakiness.  Patients with CAD will be started on a lower dose.  Levothyroxine causes virtually no side effects when used in the appropriate dose.    Certain medications, supplements and foods can affect the body s ability to absorb levothyroxine. Medications include: Iron supplements, Cholestyramine and Calcium supplements.     Patient was advised that decreased absorption of levothyroxine might occur if taken concomitantly with food or within four hours of taking calcium, iron, soy or aluminum containing antacids. Generic substitution for brand name and vice versa, or substitution of one generic formulation for another may cause abnormal TSH levels on a previously stable dose of thyroid hormone. Pt was advised that regular monitoring of thyroid function, as prescribed by the physician, and adherence to dose prescribed, is important.    Change to name brand Synthroid and increase dose to 100 mcg/day.  Will obtain TFTs and thyroid antibodies today  TFT's again in 4 weeks      Labs ordered today:   Orders Placed This Encounter   Procedures     TSH     T4 FREE     Thyroid peroxidase antibody     Anti thyroglobulin antibody     TSH     T4 FREE       More than 50% of the time spent with Ms. Muñoz on counseling / coordinating her care.  Total face to face time was greater than or equal to 30 minutes.      Follow-up:  Monthly labs  F/u in clinic in the third trimester to discuss postdelivery LT4 replacement  recommendations    Azalea Burk NP  Endocrinology  [facility]  CC:

## 2018-09-04 NOTE — PROGRESS NOTES
Please call  Yary,  Your TSH is too high for pregnancy so the increased Synthroid dose to 100 mcg/day is appropriate.  Please continue the Synthroid 100 mcg/day and make a lab appointment in 4 weeks for a recheck.  Let me know if you have any questions.  Azalea Burk NP  Endocrinology

## 2018-09-04 NOTE — TELEPHONE ENCOUNTER
Notes Recorded by Chelsea Monique RN on 9/4/2018 at 10:01 AM  See telephone encounter.  L/M to call or check [employer] for message.  Chelsea Monique RN    ------    Notes Recorded by Azalea Burk APRN CNP on 9/4/2018 at 8:09 AM  Please call  Yary,  Your TSH is too high for pregnancy so the increased Synthroid dose to 100 mcg/day is appropriate.  Please continue the Synthroid 100 mcg/day and make a lab appointment in 4 weeks for a recheck.  Let me know if you have any questions.  Azalea Burk NP  Endocrinology

## 2018-09-05 NOTE — PROGRESS NOTES
Yary,  Your lab results show elevated TPO antibodies indicating the cause of your hypothyroidism is an autoimmune thyroid disease called Hashimoto's.  You can read more about this at the American Thyroid Association website (thyroid.org).  This condition does not negatively impact your pregnancy as long as you are treatment with thyroid hormone.  I'll let you know lab results in one month.  Azalea Burk NP  Endocrinology

## 2018-09-11 DIAGNOSIS — Z34.90 SUPERVISION OF NORMAL PREGNANCY: Primary | ICD-10-CM

## 2018-09-20 ENCOUNTER — RADIANT APPOINTMENT (OUTPATIENT)
Dept: ULTRASOUND IMAGING | Facility: CLINIC | Age: 28
End: 2018-09-20
Attending: ADVANCED PRACTICE MIDWIFE
Payer: COMMERCIAL

## 2018-09-20 ENCOUNTER — PRENATAL OFFICE VISIT (OUTPATIENT)
Dept: NURSING | Facility: CLINIC | Age: 28
End: 2018-09-20
Payer: COMMERCIAL

## 2018-09-20 DIAGNOSIS — Z34.90 SUPERVISION OF NORMAL PREGNANCY: Primary | ICD-10-CM

## 2018-09-20 DIAGNOSIS — Z34.90 SUPERVISION OF NORMAL PREGNANCY: ICD-10-CM

## 2018-09-20 LAB
ABO + RH BLD: NORMAL
ABO + RH BLD: NORMAL
BETA HCG QUAL IFA URINE: POSITIVE
BLD GP AB SCN SERPL QL: NORMAL
BLOOD BANK CMNT PATIENT-IMP: NORMAL
ERYTHROCYTE [DISTWIDTH] IN BLOOD BY AUTOMATED COUNT: 12.7 % (ref 10–15)
HCT VFR BLD AUTO: 40.3 % (ref 35–47)
HGB BLD-MCNC: 13.8 G/DL (ref 11.7–15.7)
MCH RBC QN AUTO: 29.9 PG (ref 26.5–33)
MCHC RBC AUTO-ENTMCNC: 34.2 G/DL (ref 31.5–36.5)
MCV RBC AUTO: 87 FL (ref 78–100)
PLATELET # BLD AUTO: 247 10E9/L (ref 150–450)
RBC # BLD AUTO: 4.61 10E12/L (ref 3.8–5.2)
SPECIMEN EXP DATE BLD: NORMAL
WBC # BLD AUTO: 6.5 10E9/L (ref 4–11)

## 2018-09-20 PROCEDURE — 86762 RUBELLA ANTIBODY: CPT | Performed by: ADVANCED PRACTICE MIDWIFE

## 2018-09-20 PROCEDURE — 86780 TREPONEMA PALLIDUM: CPT | Performed by: ADVANCED PRACTICE MIDWIFE

## 2018-09-20 PROCEDURE — 99207 ZZC NO CHARGE LOS: CPT

## 2018-09-20 PROCEDURE — 76817 TRANSVAGINAL US OBSTETRIC: CPT | Mod: TC | Performed by: OBSTETRICS & GYNECOLOGY

## 2018-09-20 PROCEDURE — 86900 BLOOD TYPING SEROLOGIC ABO: CPT | Performed by: ADVANCED PRACTICE MIDWIFE

## 2018-09-20 PROCEDURE — 87340 HEPATITIS B SURFACE AG IA: CPT | Performed by: ADVANCED PRACTICE MIDWIFE

## 2018-09-20 PROCEDURE — 76801 OB US < 14 WKS SINGLE FETUS: CPT | Mod: TC | Performed by: OBSTETRICS & GYNECOLOGY

## 2018-09-20 PROCEDURE — 87088 URINE BACTERIA CULTURE: CPT | Performed by: ADVANCED PRACTICE MIDWIFE

## 2018-09-20 PROCEDURE — 84703 CHORIONIC GONADOTROPIN ASSAY: CPT | Performed by: ADVANCED PRACTICE MIDWIFE

## 2018-09-20 PROCEDURE — 85027 COMPLETE CBC AUTOMATED: CPT | Performed by: ADVANCED PRACTICE MIDWIFE

## 2018-09-20 PROCEDURE — 36415 COLL VENOUS BLD VENIPUNCTURE: CPT | Performed by: ADVANCED PRACTICE MIDWIFE

## 2018-09-20 PROCEDURE — 87186 SC STD MICRODIL/AGAR DIL: CPT | Performed by: ADVANCED PRACTICE MIDWIFE

## 2018-09-20 PROCEDURE — 87086 URINE CULTURE/COLONY COUNT: CPT | Performed by: ADVANCED PRACTICE MIDWIFE

## 2018-09-20 PROCEDURE — 87389 HIV-1 AG W/HIV-1&-2 AB AG IA: CPT | Performed by: ADVANCED PRACTICE MIDWIFE

## 2018-09-20 PROCEDURE — 86850 RBC ANTIBODY SCREEN: CPT | Performed by: ADVANCED PRACTICE MIDWIFE

## 2018-09-20 PROCEDURE — 86901 BLOOD TYPING SEROLOGIC RH(D): CPT | Performed by: ADVANCED PRACTICE MIDWIFE

## 2018-09-20 RX ORDER — PRENATAL VIT/IRON FUM/FOLIC AC 27MG-0.8MG
1 TABLET ORAL DAILY
Qty: 100 TABLET | Refills: 3 | COMMUNITY
Start: 2018-09-20 | End: 2024-06-19

## 2018-09-20 NOTE — NURSING NOTE
NPN nurse visit. 1st dr visit scheduled for 9/26/18 with Na Hua CNM. Pap not due. Last pap 12/2016.  9w2d    ITZEL Mnotes RN

## 2018-09-20 NOTE — MR AVS SNAPSHOT
After Visit Summary   9/20/2018    Yary Muñoz    MRN: [id number]           Patient Information     Date Of Birth          1990        Visit Information        Provider Department      9/20/2018 9:00 AM RI PRENATAL NURSE [facility]        Today's Diagnoses     Supervision of normal pregnancy    -  1       Follow-ups after your visit        Your next 10 appointments already scheduled     Sep 20, 2018  1:20 PM CDT   US OB < 14 WEEKS WITH TRANSVAGINAL SINGLE with WEUS1   [facility] ([facility])    [address] [place] [id number]   [phone]           How do I prepare for my exam? (Food and drink instructions) Drink four 8-ounce glasses of fluid an hour before your exam. If you need to empty your bladder before your exam, try to release only a little urine. Then, drink another glass of fluid.  How do I prepare for my exam? (Other instructions) You may have up to two family members in the exam room. If you bring a small child, an adult must be there to care for him or her. No video or camera photography during the procedure.  What should I wear: Wear comfortable clothes.  How long does the exam take: Most ultrasounds take 30 to 60 minutes.  What should I bring: Bring a list of your medicines, including vitamins, minerals and over-the-counter drugs. It is safest to leave personal items at home.  Do I need a :  No  is needed.  What do I need to tell my doctor: Tell your doctor about any allergies you may have.  What should I do after the exam: No restrictions, You may resume normal activities.  What is this test: An ultrasound uses sound waves to make pictures of the body. Sound waves do not cause pain. The only discomfort may be the pressure of the wand against your skin or full bladder.  Who should I call with questions: If you have any questions, please call the Imaging Department where you will have  your exam. Directions, parking instructions, and other information is available on our website, [email or web address].            Sep 26, 2018  8:00 AM CDT   New Prenatal with CHALINO Chicas CNM   [facility] ([facility]    [address]  [address]  [facility] [id number] [phone]              Future tests that were ordered for you today     Open Future Orders        Priority Expected Expires Ordered    US OB <14 Weeks w Transvaginal Single Routine  9/20/2019 9/20/2018            Who to contact     If you have questions or need follow up information about today's clinic visit or your schedule please contact [facility] directly at [phone].  Normal or non-critical lab and imaging results will be communicated to you by MyChart, letter or phone within 4 business days after the clinic has received the results. If you do not hear from us within 7 days, please contact the clinic through MyChart or phone. If you have a critical or abnormal lab result, we will notify you by phone as soon as possible.  Submit refill requests through redealize or call your pharmacy and they will forward the refill request to us. Please allow 3 business days for your refill to be completed.          Additional Information About Your Visit        Digital Lumenshart Information     redealize gives you secure access to your electronic health record. If you see a primary care provider, you can also send messages to your care team and make appointments. If you have questions, please call your primary care clinic.  If you do not have a primary care provider, please call [phone] and they will assist you.        Care EveryWhere ID     This is your Care EveryWhere ID. This could be used by other organizations to access your [facility] medical records  [id number]        Your Vitals Were     Last Period                   07/17/2018            Blood Pressure from Last 3  Encounters:   08/31/18 100/70   01/10/18 102/60    Weight from Last 3 Encounters:   08/31/18 117 lb (53.1 kg)   01/10/18 120 lb (54.4 kg)              We Performed the Following     ABO/Rh type and screen     Beta HCG qual IFA urine - [facility] and [place]     CBC with platelets     Hepatitis B surface antigen     HIV Antigen Antibody Combo     Rubella Antibody IgG Quantitative     Treponema Abs w Reflex to RPR and Titer     Urine Culture Aerobic Bacterial        Primary Care Provider Office Phone # Fax #    Rubi Madison PA-C [phone]       [address] [facility] [address]        Equal Access to Services     ZENY URIBE : Hadii aad ku hadasho Solarryali, waaxda luqadaha, qaybta kaalmada adeegyada, waxeduardo marshall . So [facility] [phone].    ATENCIÓN: Si habla español, tiene a rivers disposición servicios gratuitos de asistencia lingüística. [facility] [phone].    We comply with applicable federal civil rights laws and [place] laws. We do not discriminate on the basis of race, color, national origin, age, disability, sex, sexual orientation, or gender identity.            Thank you!     Thank you for choosing [facility]  for your care. Our goal is always to provide you with excellent care. Hearing back from our patients is one way we can continue to improve our services. Please take a few minutes to complete the written survey that you may receive in the mail after your visit with us. Thank you!             Your Updated Medication List - Protect others around you: Learn how to safely use, store and throw away your medicines at www.disposemymeds.org.          This list is accurate as of 9/20/18 11:18 AM.  Always use your most recent med list.                   Brand Name Dispense Instructions for use Diagnosis    prenatal multivitamin plus iron 27-0.8 MG Tabs per tablet     100 tablet    Take 1 tablet by mouth daily        SYNTHROID 100 MCG  tablet   Generic drug:  levothyroxine     30 tablet    Take 1 tablet (100 mcg) by mouth daily Dispense name brand Synthroid, no generics    Hypothyroidism affecting pregnancy in first trimester

## 2018-09-21 LAB
HBV SURFACE AG SERPL QL IA: NONREACTIVE
HIV 1+2 AB+HIV1 P24 AG SERPL QL IA: NONREACTIVE
RUBV IGG SERPL IA-ACNC: 94 IU/ML
T PALLIDUM AB SER QL: NONREACTIVE

## 2018-09-23 LAB
BACTERIA SPEC CULT: ABNORMAL
SPECIMEN SOURCE: ABNORMAL

## 2018-09-24 DIAGNOSIS — N39.0 URINARY TRACT INFECTION WITHOUT HEMATURIA, SITE UNSPECIFIED: Primary | ICD-10-CM

## 2018-09-24 RX ORDER — NITROFURANTOIN 25; 75 MG/1; MG/1
100 CAPSULE ORAL 2 TIMES DAILY
Qty: 14 CAPSULE | Refills: 0 | Status: SHIPPED | OUTPATIENT
Start: 2018-09-24 | End: 2018-10-22

## 2018-09-26 ENCOUNTER — PRENATAL OFFICE VISIT (OUTPATIENT)
Dept: OBGYN | Facility: CLINIC | Age: 28
End: 2018-09-26
Payer: COMMERCIAL

## 2018-09-26 VITALS — BODY MASS INDEX: 22.3 KG/M2 | WEIGHT: 118 LBS | SYSTOLIC BLOOD PRESSURE: 101 MMHG | DIASTOLIC BLOOD PRESSURE: 72 MMHG

## 2018-09-26 DIAGNOSIS — Z34.01 ENCOUNTER FOR SUPERVISION OF NORMAL FIRST PREGNANCY IN FIRST TRIMESTER: Primary | ICD-10-CM

## 2018-09-26 PROBLEM — Z34.00 ENCOUNTER FOR SUPERVISION OF NORMAL FIRST PREGNANCY: Status: ACTIVE | Noted: 2018-09-26

## 2018-09-26 PROCEDURE — 99207 ZZC FIRST OB VISIT: CPT | Performed by: ADVANCED PRACTICE MIDWIFE

## 2018-09-26 NOTE — NURSING NOTE
"Chief Complaint   Patient presents with     Prenatal Care     10 weeks 1 days- no concerns        Initial /72 (BP Location: Left arm, Patient Position: Sitting, Cuff Size: Adult Regular)  Wt 118 lb (53.5 kg)  LMP 2018  Breastfeeding? No  BMI 22.3 kg/m2 Estimated body mass index is 22.3 kg/(m^2) as calculated from the following:    Height as of 18: 5' 1\" (1.549 m).    Weight as of this encounter: 118 lb (53.5 kg).  BP completed using cuff size: regular        The following  Due: NONE    10w1d  Rd Wetzel CMA                "

## 2018-09-26 NOTE — MR AVS SNAPSHOT
After Visit Summary   9/26/2018    Yary Muñoz    MRN: [id number]           Patient Information     Date Of Birth          1990        Visit Information        Provider Department      9/26/2018 8:00 AM Na Hua APRN CNM [facility]        Today's Diagnoses     Encounter for supervision of normal first pregnancy in first trimester    -  1       Follow-ups after your visit        Follow-up notes from your care team     Return in about 4 weeks (around 10/24/2018) for Prenatal Visit.      Your next 10 appointments already scheduled     Oct 22, 2018 11:15 AM CDT   ESTABLISHED PRENATAL with Tala Wetzel CNM   [facility] ([facility])    [address]  [facility] [address] [phone]              Who to contact     If you have questions or need follow up information about today's clinic visit or your schedule please contact [facility] directly at [phone].  Normal or non-critical lab and imaging results will be communicated to you by MyChart, letter or phone within 4 business days after the clinic has received the results. If you do not hear from us within 7 days, please contact the clinic through [email or web address] or phone. If you have a critical or abnormal lab result, we will notify you by phone as soon as possible.  Submit refill requests through Proximic or call your pharmacy and they will forward the refill request to us. Please allow 3 business days for your refill to be completed.          Additional Information About Your Visit        MyChart Information     Proximic gives you secure access to your electronic health record. If you see a primary care provider, you can also send messages to your care team and make appointments. If you have questions, please call your primary care clinic.  If you do not have a primary care provider, please call [phone] and they will assist you.        Care EveryWhere ID     This  is your Care EveryWhere ID. This could be used by other organizations to access your [facility] medical records  [id number]        Your Vitals Were     Last Period Breastfeeding? BMI (Body Mass Index)             07/17/2018 No 22.3 kg/m2          Blood Pressure from Last 3 Encounters:   09/26/18 101/72   08/31/18 100/70   01/10/18 102/60    Weight from Last 3 Encounters:   09/26/18 118 lb (53.5 kg)   08/31/18 117 lb (53.1 kg)   01/10/18 120 lb (54.4 kg)              We Performed the Following     CHLAMYDIA TRACHOMATIS PCR     NEISSERIA GONORRHOEA PCR        Primary Care Provider Office Phone # Fax #    Rubi Madison PA-C [phone]       [address] [place] [address]        Equal Access to Services     [facility] HospitalLINDA : Hadii ilia lara hadasho Sotanisha, waaxda luqadaha, qaybta kaalmada adepalyada, gaudencio marshall . So [facility] [phone].    ATENCIÓN: Si habla español, tiene a rivers disposición servicios gratuitos de asistencia lingüística. [facility] [phone].    We comply with applicable federal civil rights laws and [place] laws. We do not discriminate on the basis of race, color, national origin, age, disability, sex, sexual orientation, or gender identity.            Thank you!     Thank you for choosing [facility]  for your care. Our goal is always to provide you with excellent care. Hearing back from our patients is one way we can continue to improve our services. Please take a few minutes to complete the written survey that you may receive in the mail after your visit with us. Thank you!             Your Updated Medication List - Protect others around you: Learn how to safely use, store and throw away your medicines at www.disposemymeds.org.          This list is accurate as of 9/26/18  8:59 AM.  Always use your most recent med list.                   Brand Name Dispense Instructions for use Diagnosis    nitroFURantoin  (macrocrystal-monohydrate) 100 MG capsule    MACROBID    14 capsule    Take 1 capsule (100 mg) by mouth 2 times daily    Urinary tract infection without hematuria, site unspecified       prenatal multivitamin plus iron 27-0.8 MG Tabs per tablet     100 tablet    Take 1 tablet by mouth daily        SYNTHROID 100 MCG tablet   Generic drug:  levothyroxine     30 tablet    Take 1 tablet (100 mcg) by mouth daily Dispense name brand Synthroid, no generics    Hypothyroidism affecting pregnancy in first trimester

## 2018-09-26 NOTE — PROGRESS NOTES
Yary Muñoz is a 27 year old  ,  who is not a previous CNM patient. She presents for a new OB Visit. This was a planned pregnancy.     FOB is in good health.  FOB IS actively involved in relationship and this pregnancy.    Yary presents for her first care this pregnancy. She desires midwifery care. She has a history of hypothyroidism. She follows with endocrinology. She has a history of a pelvic fracture at age 15. She did not need any surgery. We will request records to determine best mode of delivery. Labs and U/S WNL.   She has not had bleeding since her LMP.    She denies abdominal pain since her LMP.  She has had nausea.  has not had vomiting.  Any personal or family history of blood clots? No  History of sickle cell anemia or trait? No         Patient's last menstrual period was 2018..  Estimated Date of Delivery: 2019 Ultrasound consistent with LMP.    MENSTRUAL HISTORY    frequency: every 28 days  Last PAP:  2016  History of abnormal Pap?  No    Health maintenance updated:  yes        Current medications are:    Current Outpatient Prescriptions:      nitroFURantoin, macrocrystal-monohydrate, (MACROBID) 100 MG capsule, Take 1 capsule (100 mg) by mouth 2 times daily, Disp: 14 capsule, Rfl: 0     Prenatal Vit-Fe Fumarate-FA (PRENATAL MULTIVITAMIN PLUS IRON) 27-0.8 MG TABS per tablet, Take 1 tablet by mouth daily, Disp: 100 tablet, Rfl: 3     SYNTHROID 100 MCG tablet, Take 1 tablet (100 mcg) by mouth daily Dispense name brand Synthroid, no generics, Disp: 30 tablet, Rfl: 3     INFECTION HISTORY  HIV: No  Hepatitis B: No  Hepatitis C: No  Tuberculosis: No    Genital Herpes self: no  Herpes partner:  no  Chlamydia:  no  Gonorrhea:  no  HPV: No  BV:  No  Syphilis:  No  Chicken Pox:  No      OB HISTORY  Obstetric History       T0      L0     SAB0   TAB0   Ectopic0   Multiple0   Live Births0       # Outcome Date GA Lbr Edilson/2nd Weight Sex Delivery Anes PTL Lv   1  "Current                   History of GDM: No,  PTL : No,  History of HTN in pregnancy: No,  Thrombocytopenia: No,  Shoulder dystocia: No,  Vacuum Extraction: No  PPH: No   3rd of 4th degree laceration: No.   Other complications: No    PERSONAL HISTORY  Exercise Habits:  walking irregularly days per week.  Employment: Full time.  Her job involves light activity with little potential for toxic exposure.    Travel plans:  are none planned.   Diet: eats regular meals and takes daily vitamins  Abuse concerns? No  Hgb A1c screen:  BMI > 30: No, 1st degree family DM: No, History of GDM: No, PCOS: No, High risk ethnicity: No    Social History     Social History     Marital status:      Spouse name: Woody     Number of children: 0     Years of education: N/A     Occupational History     RN      [employer]     Social History Main Topics     Smoking status: Never Smoker     Smokeless tobacco: Never Used     Alcohol use No     Drug use: No     Sexual activity: Yes     Partners: Male     Other Topics Concern     Not on file     Social History Narrative         She  reports that she has never smoked. She has never used smokeless tobacco.    STD testing offered?  Accepted  Last PHQ-9 score on record = No flowsheet data found.  Last GAD7 score on record = No flowsheet data found.  Alcohol Score = 0  Referral/Meds needed? no    PAST MEDICAL/SURGICAL HISTORY  Past Medical History:   Diagnosis Date     Hypothyroid 2013    irregular period, weight gain, family history     Past Surgical History:   Procedure Laterality Date     NATE FERRERA         FAMILY HISTORY  Family History   Problem Relation Age of Onset     Thyroid Disease Mother      hypothyroid     Thyroid Disease Father      hypothyroid     Other Cancer Maternal Grandfather      lung     Other Cancer Paternal Grandfather      skin     Breast Cancer Other      great aunt     Breast Cancer Maternal Aunt      early 40\"s     Hypothyroidism Brother          ROS:  12 point " review of systems negative other than symptoms noted below.      PHYSICAL EXAM  Vitals: /72 (BP Location: Left arm, Patient Position: Sitting, Cuff Size: Adult Regular)  Wt 118 lb (53.5 kg)  LMP 2018  Breastfeeding? No  BMI 22.3 kg/m2  BMI= Body mass index is 22.3 kg/(m^2).     GENERAL:  27 year old pleasant pregnant female, alert, cooperative and well groomed.  NECK:  Thyroid without enlargement and nodules.  Lymph nodes not palpable.   LUNGS:  Clear to auscultation.  BREAST:  Symmetrical without lesions or nodes.  Nipples everted.  Areolas symmetric.  No palpable axillary nodes.  HEART:  RRR without murmur.  ABDOMEN: Soft without masses or tenderness.  No scars noted..  GENITALIA: BUS without tenderness or inflammation.  Perineum without lesions.    VAGINA:  Pink, normal rugae and discharge.  CERVIX:  Posterior, smooth, without discharge, and firm/ closed   UTERUS: Midposition, nontender 10 weeks in size.  ADNEXA: Without masses or tenderness  RECTAL:  Normal appearance.  Digital exam deferred.  LOWER EXTREMITIES: No edema. No significant varicosities.    ASSESSMENT/PLAN:    IUP at 10w1d    ICD-10-CM    1. Encounter for supervision of normal first pregnancy in first trimester Z34.01 NEISSERIA GONORRHOEA PCR     CHLAMYDIA TRACHOMATIS PCR        consult for US for AMA patients: NA  Genetic Testing reviewed and discussed, patient considering options.     COUNSELING    Instructed on use of triage nurse line and contacting the on call CNM after hours in an emergency.     Symptoms of N&V and fatigue usually start to resolve around 12-16 weeks     Reviewed CNM philosophy, call schedule for labor and delivery, and FSH for delivery    1st OB handout given outlining appointment spacing and CNM information    Reviewed exercise and nutrition    Recommend to gain 25 pounds with her pregnancy.    Discussed OTC medications. OB med list given    Encouraged patient to arrange  if  needed    Encouraged patient to take PNV's/DHA    Travel precautions discussed, no air travel after 36 weeks and Zika Virus discussed    Will call patient with lab results when available    Does patient desire a RN home visit from the [facility]?  No    If yes, paperwork completed?  No     F/U to be addressed next visit: Rx's given, referrals    Will return to the clinic in 4 weeks for her next routine prenatal check.  Will call to be seen sooner if problems arise.    Oriented to midwifery practice. Records request filled out for records of pelvic fracture. Will consult with a physician to determine best mode of delivery and if patient is a good candidate for midwifery care. GC/CT collected. Discussed genetics testing options. Patient and  will consider options and let us know what they desire.     Na Hua CNM

## 2018-09-27 LAB
C TRACH DNA SPEC QL NAA+PROBE: NORMAL
N GONORRHOEA DNA SPEC QL NAA+PROBE: NORMAL
SPECIMEN SOURCE: NORMAL
SPECIMEN SOURCE: NORMAL

## 2018-10-01 DIAGNOSIS — O99.281 HYPOTHYROIDISM AFFECTING PREGNANCY IN FIRST TRIMESTER: ICD-10-CM

## 2018-10-01 DIAGNOSIS — E03.9 HYPOTHYROIDISM AFFECTING PREGNANCY IN FIRST TRIMESTER: ICD-10-CM

## 2018-10-01 LAB
T4 FREE SERPL-MCNC: 1.11 NG/DL (ref 0.76–1.46)
TSH SERPL DL<=0.005 MIU/L-ACNC: 7.27 MU/L (ref 0.4–4)

## 2018-10-01 PROCEDURE — 84439 ASSAY OF FREE THYROXINE: CPT | Performed by: CLINICAL NURSE SPECIALIST

## 2018-10-01 PROCEDURE — 36415 COLL VENOUS BLD VENIPUNCTURE: CPT | Performed by: CLINICAL NURSE SPECIALIST

## 2018-10-01 PROCEDURE — 84443 ASSAY THYROID STIM HORMONE: CPT | Performed by: CLINICAL NURSE SPECIALIST

## 2018-10-04 ENCOUNTER — MYC MEDICAL ADVICE (OUTPATIENT)
Dept: ENDOCRINOLOGY | Facility: CLINIC | Age: 28
End: 2018-10-04

## 2018-10-04 DIAGNOSIS — O99.281 HYPOTHYROIDISM AFFECTING PREGNANCY IN FIRST TRIMESTER: Primary | ICD-10-CM

## 2018-10-04 DIAGNOSIS — E03.9 HYPOTHYROIDISM AFFECTING PREGNANCY IN FIRST TRIMESTER: Primary | ICD-10-CM

## 2018-10-04 RX ORDER — LEVOTHYROXINE SODIUM 125 UG/1
125 TABLET ORAL DAILY
Qty: 30 TABLET | Refills: 1 | Status: SHIPPED | OUTPATIENT
Start: 2018-10-04 | End: 2018-11-05

## 2018-10-04 NOTE — TELEPHONE ENCOUNTER
Azalea- see mychart message below.  Please advise.  Chelsea Monique, RN    Component      Latest Ref Rng & Units 8/31/2018 10/1/2018   TSH      0.40 - 4.00 mU/L 3.86 7.27 (H)   T4 Free      0.76 - 1.46 ng/dL 1.29 1.11

## 2018-10-04 NOTE — TELEPHONE ENCOUNTER
Please call and let her know I am increasing her levothyroxine dose to 125 mcg/day.  I sent a new prescription to her pharmacy.  She needs to make a lab appointment in 4 weeks for a recheck.  Let me know if she has any questions.  Azalea Burk NP  Endocrinology

## 2018-10-05 NOTE — PROGRESS NOTES
Levothyroxine dose increased to 125 mcg/day.  Repeat labs in 4 weeks.  Patient advised.  Azalea Burk NP  Endocrinology

## 2018-10-22 ENCOUNTER — PRENATAL OFFICE VISIT (OUTPATIENT)
Dept: OBGYN | Facility: CLINIC | Age: 28
End: 2018-10-22
Payer: COMMERCIAL

## 2018-10-22 VITALS — DIASTOLIC BLOOD PRESSURE: 60 MMHG | WEIGHT: 120 LBS | BODY MASS INDEX: 22.67 KG/M2 | SYSTOLIC BLOOD PRESSURE: 102 MMHG

## 2018-10-22 DIAGNOSIS — Z34.02 ENCOUNTER FOR SUPERVISION OF NORMAL FIRST PREGNANCY IN SECOND TRIMESTER: Primary | ICD-10-CM

## 2018-10-22 PROCEDURE — 99207 ZZC PRENATAL VISIT: CPT | Performed by: ADVANCED PRACTICE MIDWIFE

## 2018-10-22 PROCEDURE — 87491 CHLMYD TRACH DNA AMP PROBE: CPT | Performed by: ADVANCED PRACTICE MIDWIFE

## 2018-10-22 PROCEDURE — 87591 N.GONORRHOEAE DNA AMP PROB: CPT | Performed by: ADVANCED PRACTICE MIDWIFE

## 2018-10-22 NOTE — PATIENT INSTRUCTIONS
Weeks 14 thru 16 - Gestational Age (Fetal age - Weeks 12 thru 14):  The fetus s skin is thin and see-through. Fine hair called lanugo begins to form on the head. The fetus begins sucking and swallows bits of amniotic fluid. Fingerprints which individualize each human being have now begun to develop on the tiny fingers of the fetus. Meconium is made in the intestinal tract and will build up to be the baby s first bowel movement. Flutters may be felt in the mom s growing abdomen, as the fetus begins to move around more. Sweat glands have developed, and the liver and pancreas produce fluid secretions. The fetus has reached 6 inches in length and weighs about 4 ounces    Genetic Screening in Pregnancy    There are several options you have for genetic screening in your pregnancy.  Everyone has their own personal reasons to screen or not to screen.  We want you to make the best choice for you and your pregnancy.  Below is a list of options, what they screen for and when the screening is done.  Genetic screening is recommended for women who are 35 and older at delivery and for those with a family history of chromosomal abnormalities. However, screening is offered to all women.    Innatal:      This is a screening for the more common chromosome abnormalities, including trisomy 21 (Down's syndrome), trisomy 18, trisomy 13 and sex chromosome abnormalities. This is a prenatal test that can be done as early as 10 weeks gestation.       A maternal blood sample is drawn that sequences cell-free DNA in maternal plasma. This in turn allows for molecular counting of chromosome copy numbers with a >99% detection rate of Down syndrome, a 97% detection rate of Trisomy 18, and a 78% detection rate of Trisomy 13.    This test will give information about the gender of the baby.  You can choose to not have that disclosed.       Results come back within 10-14 days.     About 5% of samples will have results that are designated as  "\"indeterminate\" or \"uninterruptable.\" With this type of result, genetic counseling and diagnostic testing are recommended. Remember this is a screening test and does not definitively diagnose or exclude the presence of these chromosome conditions.     This screening may or may not be covered by insurance.  We recommend you consult your insurance company to discuss.  Financial assistance is available at a low cost if not covered by your insurance.         Hemoglobin Electrophoresis:  Hemoglobin electrophoresis is a non-invasive blood test that looks at abnormal hemoglobin (component of red blood cells) production. Examples of this include sickle cell anemia (which is a disorder of the red blood cells and their shape) and the thalassemia s (which is also a form of anemia).  High risk groups include:  , Southeast Asians, , Mediterranean, Bulgarian, South and Central American and Vipul descent. This blood test is usually done with your first OB labs.  This is a one-time test.      Trio Carrier Screen:   1.  Cystic Fibrosis (CF) is the most common life-shortening autosomal  recessive disease among  populations, with a frequency of 1 in  Every 3,500 live births. Although it mainly affects the   Population anyone can request screening. If you have a family history of  cystic fibrosis you should request this test.  This screening is a blood draw      2.  Spinal Muscular Atrophy (SMA) is the most common inherited cause  of infant death.  The most common form of this disorder causes death by a age two.  One in every 6,000 to 10,000 babies born in the  has SMA      3.  Fragile X Syndrome (FXS) is the most common inherited cause of  intellectual disability.  Approximately 1 in every 3,600 boys and 1 in every  6,000 girls is born with FXS      **If you are a carrier of CF or SMA, your partner will also need to be tested. This partner testing is offered free of charge.  This screen can " be done prior to pregnancy or at any time during the pregnancy.      Quad Screen:    The quad screen is a quadruple marker screening test done during the second trimester of your pregnancy. This prenatal screening test that measures levels of four substances in a pregnant woman's blood; Alpha-fetoprotein (AFP), Human chorionic gonadotropin (HCG), Estriol, and Inhibin A.     AFP screens for open neural tube defects like Spina Bifida and Anencephaly.     Spina bifida is a serious birth defect that occurs when a portion of the neural tube fails to develop or close properly, causing defects in the spinal cord and in the bones of the spine.     Anencephaly is a serious birth defect in the closure of the neural tube, resulting in an underdeveloped brain and an incomplete skull.     Human chorionic gonadotropin (HCG), Estriol, and Inhibin screen for Down syndrome (trisomy 21) and Trisomy 18.     Down syndrome is a chromosomal disorder that causes lifelong intellectual disability and developmental delays and, in some people, health problems    Trisomy 18 is a chromosomal disorder that causes severe developmental delays and anatomic abnormalities. It is often fatal by age 1.    The screening is ideally done between 15 and 18 weeks of pregnancy but can be done up to week 20. Even if you have done the Verifi testing you can still get a single AFP drawn to check for neural tube defects.       Screening Ultrasound:    This is a fetal survey done around 20 weeks gestation.  This screen will look at the cardiac activity, fetal heart rate and rhythm, assessment of the amniotic fluid volume, placenta appearance and location, and the umbilical cord vessel number and placental insertion site.  They also do many fetal measurements to make sure the baby is growing properly.  The cervical length is also measured and fetal movement is evaluated.  The sex of the baby can usually be determined.      In the event of a positive  screen:    There are two diagnostic tests available if any screening tests come back with an increased risk.  You would first be referred to Maternal Fetal Medicine to be seen by a genetic counselor.  They would assess your risk and see if further diagnostic testing is warranted.  The options are; chorionic villus sampling (CVS), usually done at 11 to 12 weeks of pregnancy and amniocentesis which is generally done later in pregnancy around 15 to 20 weeks.  All risks/benefits would be explained and you can decide what course of action is best for you and your family.    Why you might choose to screen?    A desire to know as much as you can about your baby    If your baby had a genetic abnormality you can learn about it before they are born    Choose whether to continue the pregnancy or to terminate    Why you might choose to NOT screen?    You feel that whatever happens is fine and you would not terminate    You know you don't want to do any diagnostic tests even if the screening test showed a high possibility of a genetic abnormality        For further information please call:  Sergey Min  [phone]

## 2018-10-22 NOTE — NURSING NOTE
"Chief Complaint   Patient presents with     Prenatal Care     13w 6d       Initial /60 (Cuff Size: Adult Regular)  Wt 120 lb (54.4 kg)  LMP 2018  BMI 22.67 kg/m2 Estimated body mass index is 22.67 kg/(m^2) as calculated from the following:    Height as of 18: 5' 1\" (1.549 m).    Weight as of this encounter: 120 lb (54.4 kg).  BP completed using cuff size: regular    Questioned patient about current smoking habits.  Pt. has never smoked.          The following HM Due: NONE      Nilda Corley CMA             "

## 2018-10-22 NOTE — PROGRESS NOTES
S: Patient feels well and has no concerns.  Patient has not had nausea and has not had vomiting  O: /60 (Cuff Size: Adult Regular)  Wt 120 lb (54.4 kg)  LMP 07/17/2018  BMI 22.67 kg/m2       Exam:  Constitutional: healthy, alert and no distress  Respiratory: Respirations even and unlabored  Gastrointestinal: Abdomen soft, non-tender. Fundus measures appropriately for gestational age. Fetal heart tones heard easily  Psychiatric: mentation appears normal and affect normal/bright  A:    Diagnosis Comments   1. Encounter for supervision of normal first pregnancy in second trimester  NEISSERIA GONORRHOEA PCR, CHLAMYDIA TRACHOMATIS PCR      P:  Educated about diet and exercise and normal weight gain  Normal to feel movement between 18-22 weeks  Reviewed labs from 1st OB  Discussed genetic screening; patient has not had a chance to decide if she wishes to have additional genetic screenings.  Warning signs discussed  Discussed option for Quad screen at next visit.   Return to clinic 4 weeks  Encouraged patient to call with any questions or concerns.    CHALINO Kuo, CNM

## 2018-10-22 NOTE — MR AVS SNAPSHOT
After Visit Summary   10/22/2018    Yary Muñoz    MRN: [id number]           Patient Information     Date Of Birth          1990        Visit Information        Provider Department      10/22/2018 11:15 AM Tala Wetzel CNM [facility]        Today's Diagnoses     Encounter for supervision of normal first pregnancy in second trimester    -  1      Care Instructions    Weeks 14 thru 16 - Gestational Age (Fetal age - Weeks 12 thru 14):  The fetus s skin is thin and see-through. Fine hair called lanugo begins to form on the head. The fetus begins sucking and swallows bits of amniotic fluid. Fingerprints which individualize each human being have now begun to develop on the tiny fingers of the fetus. Meconium is made in the intestinal tract and will build up to be the baby s first bowel movement. Flutters may be felt in the mom s growing abdomen, as the fetus begins to move around more. Sweat glands have developed, and the liver and pancreas produce fluid secretions. The fetus has reached 6 inches in length and weighs about 4 ounces    Genetic Screening in Pregnancy    There are several options you have for genetic screening in your pregnancy.  Everyone has their own personal reasons to screen or not to screen.  We want you to make the best choice for you and your pregnancy.  Below is a list of options, what they screen for and when the screening is done.  Genetic screening is recommended for women who are 35 and older at delivery and for those with a family history of chromosomal abnormalities. However, screening is offered to all women.    Innatal:      This is a screening for the more common chromosome abnormalities, including trisomy 21 (Down's syndrome), trisomy 18, trisomy 13 and sex chromosome abnormalities. This is a prenatal test that can be done as early as 10 weeks gestation.       A maternal blood sample is drawn that sequences cell-free DNA in maternal plasma. This in  "turn allows for molecular counting of chromosome copy numbers with a >99% detection rate of Down syndrome, a 97% detection rate of Trisomy 18, and a 78% detection rate of Trisomy 13.    This test will give information about the gender of the baby.  You can choose to not have that disclosed.       Results come back within 10-14 days.     About 5% of samples will have results that are designated as \"indeterminate\" or \"uninterruptable.\" With this type of result, genetic counseling and diagnostic testing are recommended. Remember this is a screening test and does not definitively diagnose or exclude the presence of these chromosome conditions.     This screening may or may not be covered by insurance.  We recommend you consult your insurance company to discuss.  Financial assistance is available at a low cost if not covered by your insurance.         Hemoglobin Electrophoresis:  Hemoglobin electrophoresis is a non-invasive blood test that looks at abnormal hemoglobin (component of red blood cells) production. Examples of this include sickle cell anemia (which is a disorder of the red blood cells and their shape) and the thalassemia s (which is also a form of anemia).  High risk groups include:  , Southeast Asians, , Mediterranean, Welsh, South and Central American and Vipul descent. This blood test is usually done with your first OB labs.  This is a one-time test.      Trio Carrier Screen:   1.  Cystic Fibrosis (CF) is the most common life-shortening autosomal  recessive disease among  populations, with a frequency of 1 in  Every 3,500 live births. Although it mainly affects the   Population anyone can request screening. If you have a family history of  cystic fibrosis you should request this test.  This screening is a blood draw      2.  Spinal Muscular Atrophy (SMA) is the most common inherited cause  of infant death.  The most common form of this disorder causes death by " a age two.  One in every 6,000 to 10,000 babies born in the [place] has SMA      3.  Fragile X Syndrome (FXS) is the most common inherited cause of  intellectual disability.  Approximately 1 in every 3,600 boys and 1 in every  6,000 girls is born with FXS      **If you are a carrier of CF or SMA, your partner will also need to be tested. This partner testing is offered free of charge.  This screen can be done prior to pregnancy or at any time during the pregnancy.      Quad Screen:    The quad screen is a quadruple marker screening test done during the second trimester of your pregnancy. This prenatal screening test that measures levels of four substances in a pregnant woman's blood; Alpha-fetoprotein (AFP), Human chorionic gonadotropin (HCG), Estriol, and Inhibin A.     AFP screens for open neural tube defects like Spina Bifida and Anencephaly.     Spina bifida is a serious birth defect that occurs when a portion of the neural tube fails to develop or close properly, causing defects in the spinal cord and in the bones of the spine.     Anencephaly is a serious birth defect in the closure of the neural tube, resulting in an underdeveloped brain and an incomplete skull.     Human chorionic gonadotropin (HCG), Estriol, and Inhibin screen for Down syndrome (trisomy 21) and Trisomy 18.     Down syndrome is a chromosomal disorder that causes lifelong intellectual disability and developmental delays and, in some people, health problems    Trisomy 18 is a chromosomal disorder that causes severe developmental delays and anatomic abnormalities. It is often fatal by age 1.    The screening is ideally done between 15 and 18 weeks of pregnancy but can be done up to week 20. Even if you have done the Verifi testing you can still get a single AFP drawn to check for neural tube defects.       Screening Ultrasound:    This is a fetal survey done around 20 weeks gestation.  This screen will look at the cardiac activity, fetal heart rate  and rhythm, assessment of the amniotic fluid volume, placenta appearance and location, and the umbilical cord vessel number and placental insertion site.  They also do many fetal measurements to make sure the baby is growing properly.  The cervical length is also measured and fetal movement is evaluated.  The sex of the baby can usually be determined.      In the event of a positive screen:    There are two diagnostic tests available if any screening tests come back with an increased risk.  You would first be referred to Maternal Fetal Medicine to be seen by a genetic counselor.  They would assess your risk and see if further diagnostic testing is warranted.  The options are; chorionic villus sampling (CVS), usually done at 11 to 12 weeks of pregnancy and amniocentesis which is generally done later in pregnancy around 15 to 20 weeks.  All risks/benefits would be explained and you can decide what course of action is best for you and your family.    Why you might choose to screen?    A desire to know as much as you can about your baby    If your baby had a genetic abnormality you can learn about it before they are born    Choose whether to continue the pregnancy or to terminate    Why you might choose to NOT screen?    You feel that whatever happens is fine and you would not terminate    You know you don't want to do any diagnostic tests even if the screening test showed a high possibility of a genetic abnormality        For further information please call:  [facility]  [phone]                  Follow-ups after your visit        Follow-up notes from your care team     Return in about 4 weeks (around 11/19/2018) for Next prenatal exam.      Who to contact     If you have questions or need follow up information about today's clinic visit or your schedule please contact [facility] directly at [phone].  Normal or non-critical lab and imaging results will be communicated to you by [place]  letter or phone within 4 business days after the clinic has received the results. If you do not hear from us within 7 days, please contact the clinic through [employer] or phone. If you have a critical or abnormal lab result, we will notify you by phone as soon as possible.  Submit refill requests through [employer] or call your pharmacy and they will forward the refill request to us. Please allow 3 business days for your refill to be completed.          Additional Information About Your Visit        [employer] Information     [employer] gives you secure access to your electronic health record. If you see a primary care provider, you can also send messages to your care team and make appointments. If you have questions, please call your primary care clinic.  If you do not have a primary care provider, please call [phone] and they will assist you.        Care EveryWhere ID     This is your Care EveryWhere ID. This could be used by other organizations to access your [place] medical records  [id number]        Your Vitals Were     Last Period BMI (Body Mass Index)                07/17/2018 22.67 kg/m2           Blood Pressure from Last 3 Encounters:   10/22/18 102/60   09/26/18 101/72   08/31/18 100/70    Weight from Last 3 Encounters:   10/22/18 120 lb (54.4 kg)   09/26/18 118 lb (53.5 kg)   08/31/18 117 lb (53.1 kg)              We Performed the Following     CHLAMYDIA TRACHOMATIS PCR     NEISSERIA GONORRHOEA PCR        Primary Care Provider Office Phone # Fax #    Rubi Madison PA-C [phone]       [address] [place] [id number]        Equal Access to Services     [facility]: Hadii aad ku hadasho Soomaali, waaxda luqadaha, qaybta kaalmada justina, gaudencio marshall . So [facility] [phone].    ATENCIÓN: Si habla español, tiene a rivers disposición servicios gratuitos de asistencia lingüística. Llame al [phone].    We comply with applicable federal civil rights  laws and [place] laws. We do not discriminate on the basis of race, color, national origin, age, disability, sex, sexual orientation, or gender identity.            Thank you!     Thank you for choosing [facility]  for your care. Our goal is always to provide you with excellent care. Hearing back from our patients is one way we can continue to improve our services. Please take a few minutes to complete the written survey that you may receive in the mail after your visit with us. Thank you!             Your Updated Medication List - Protect others around you: Learn how to safely use, store and throw away your medicines at www.disposemymeds.org.          This list is accurate as of 10/22/18 11:43 AM.  Always use your most recent med list.                   Brand Name Dispense Instructions for use Diagnosis    FISH OIL PO           levothyroxine 125 MCG tablet    SYNTHROID/LEVOTHROID    30 tablet    Take 1 tablet (125 mcg) by mouth daily    Hypothyroidism affecting pregnancy in first trimester       prenatal multivitamin plus iron 27-0.8 MG Tabs per tablet     100 tablet    Take 1 tablet by mouth daily

## 2018-10-24 LAB
C TRACH DNA SPEC QL NAA+PROBE: NEGATIVE
N GONORRHOEA DNA SPEC QL NAA+PROBE: NEGATIVE
SPECIMEN SOURCE: NORMAL
SPECIMEN SOURCE: NORMAL

## 2018-10-31 DIAGNOSIS — O99.281 HYPOTHYROIDISM AFFECTING PREGNANCY IN FIRST TRIMESTER: ICD-10-CM

## 2018-10-31 DIAGNOSIS — E03.9 HYPOTHYROIDISM AFFECTING PREGNANCY IN FIRST TRIMESTER: ICD-10-CM

## 2018-10-31 PROCEDURE — 84439 ASSAY OF FREE THYROXINE: CPT | Performed by: CLINICAL NURSE SPECIALIST

## 2018-10-31 PROCEDURE — 36415 COLL VENOUS BLD VENIPUNCTURE: CPT | Performed by: CLINICAL NURSE SPECIALIST

## 2018-10-31 PROCEDURE — 84443 ASSAY THYROID STIM HORMONE: CPT | Performed by: CLINICAL NURSE SPECIALIST

## 2018-11-01 LAB
T4 FREE SERPL-MCNC: 1.19 NG/DL (ref 0.76–1.46)
TSH SERPL DL<=0.005 MIU/L-ACNC: 3.53 MU/L (ref 0.4–4)

## 2018-11-05 RX ORDER — LEVOTHYROXINE SODIUM 137 UG/1
137 TABLET ORAL DAILY
Qty: 30 TABLET | Refills: 1 | Status: SHIPPED | OUTPATIENT
Start: 2018-11-05 | End: 2019-01-08

## 2018-11-06 ENCOUNTER — TELEPHONE (OUTPATIENT)
Dept: ENDOCRINOLOGY | Facility: CLINIC | Age: 28
End: 2018-11-06

## 2018-11-06 NOTE — PROGRESS NOTES
Please call  Yary,  Your thyroid levels are in the normal range but TSH is higher than recommended for pregnancy.  I am increasing your levothyroxine dose to 137 mcg/day.  I'll send a new prescription to your pharmacy.  Please repeat labs in another 4 weeks.  Let me know if you have any questions.  Azalea Burk NP  Endocrinology

## 2018-11-06 NOTE — TELEPHONE ENCOUNTER
Notes Recorded by Azalea Burk APRN CNP on 11/5/2018 at 6:55 PM  Please call  Yary,  Your thyroid levels are in the normal range but TSH is higher than recommended for pregnancy.  I am increasing your levothyroxine dose to 137 mcg/day.  I'll send a new prescription to your pharmacy.  Please repeat labs in another 4 weeks.  Let me know if you have any questions.  Azalea Burk NP  Endocrinology    LM to CB    Jane Lassiter RN, BS  Clinical Nurse Triage.

## 2018-11-06 NOTE — TELEPHONE ENCOUNTER
Pt returned call  Message given  Voiced understanding    Jane Lassiter RN, BS  Clinical Nurse Triage.

## 2018-11-19 ENCOUNTER — PRENATAL OFFICE VISIT (OUTPATIENT)
Dept: OBGYN | Facility: CLINIC | Age: 28
End: 2018-11-19
Payer: COMMERCIAL

## 2018-11-19 VITALS — DIASTOLIC BLOOD PRESSURE: 56 MMHG | WEIGHT: 124 LBS | SYSTOLIC BLOOD PRESSURE: 104 MMHG | BODY MASS INDEX: 23.43 KG/M2

## 2018-11-19 DIAGNOSIS — Z34.02 ENCOUNTER FOR SUPERVISION OF NORMAL FIRST PREGNANCY IN SECOND TRIMESTER: Primary | ICD-10-CM

## 2018-11-19 PROCEDURE — 99207 ZZC PRENATAL VISIT: CPT | Performed by: ADVANCED PRACTICE MIDWIFE

## 2018-11-19 NOTE — NURSING NOTE
"Chief Complaint   Patient presents with     Prenatal Care     17w 6d, no concerns       Initial /56 (BP Location: Right arm, Cuff Size: Adult Regular)  Wt 124 lb (56.2 kg)  LMP 2018  BMI 23.43 kg/m2 Estimated body mass index is 23.43 kg/(m^2) as calculated from the following:    Height as of 18: 5' 1\" (1.549 m).    Weight as of this encounter: 124 lb (56.2 kg).  BP completed using cuff size: regular    Questioned patient about current smoking habits.  Pt. has never smoked.          The following HM Due: NONE    Nilda Corley CMA               "

## 2018-11-19 NOTE — MR AVS SNAPSHOT
After Visit Summary   11/19/2018    Yary Muñoz    MRN: [id number]           Patient Information     Date Of Birth          1990        Visit Information        Provider Department      11/19/2018 11:00 AM Tala Wetzel CNM [facility]        Today's Diagnoses     Encounter for supervision of normal first pregnancy in second trimester    -  1      Care Instructions    Weeks 17 thru 20 - Gestational Age (Fetal Age - Weeks 15 thru 18):  The baby has reached a point where movements are being felt more often by the mother. The eyebrows and eyelashes grow in, and tiny nails have begun to grow on the fingers and toes. The skin of the fetus is going through many changes and begins to produce vernix at the twentieth week. Vernix is a white pasty substance that covers the fetus  skin to protect it from amniotic fluid. Your baby can now hear your voices and music.  A fetal heartbeat can be heard by a stethoscope now. The fetus has reached a length of 8 inches and weighs about 12 ounces.            Follow-ups after your visit        Follow-up notes from your care team     Return in about 4 weeks (around 12/17/2018) for Next prenatal exam.      Future tests that were ordered for you today     Open Future Orders        Priority Expected Expires Ordered    US OB > 14 Weeks Routine  11/19/2019 11/19/2018            Who to contact     If you have questions or need follow up information about today's clinic visit or your schedule please contact [facility] directly at [phone].  Normal or non-critical lab and imaging results will be communicated to you by MyChart, letter or phone within 4 business days after the clinic has received the results. If you do not hear from us within 7 days, please contact the clinic through [email or web address] or phone. If you have a critical or abnormal lab result, we will notify you by phone as soon as possible.  Submit refill requests through [employer] or call  your pharmacy and they will forward the refill request to us. Please allow 3 business days for your refill to be completed.          Additional Information About Your Visit        BIOCUREXhart Information     BIOC[employer]hart gives you secure access to your electronic health record. If you see a primary care provider, you can also send messages to your care team and make appointments. If you have questions, please call your primary care clinic.  If you do not have a primary care provider, please call [phone] and they will assist you.        Care EveryWhere ID     This is your Care EveryWhere ID. This could be used by other organizations to access your [facility] medical records  [id number]        Your Vitals Were     Last Period BMI (Body Mass Index)                07/17/2018 23.43 kg/m2           Blood Pressure from Last 3 Encounters:   11/19/18 104/56   10/22/18 102/60   09/26/18 101/72    Weight from Last 3 Encounters:   11/19/18 124 lb (56.2 kg)   10/22/18 120 lb (54.4 kg)   09/26/18 118 lb (53.5 kg)               Primary Care Provider Office Phone # Fax #    Rubi Madison PA-C [phone]       [address] [place] [id number]        Equal Access to Services     TYRONE URIBE : Hadii ilia ku hadasho Soomaali, waaxda luqadaha, qaybta kaalmada adeegyada, waxay inez hassann gil webb. So [facility] [phone].    ATENCIÓN: Si habla español, tiene a rivers disposición servicios gratuitos de asistencia lingüística. Llame al [phone].    We comply with applicable federal civil rights laws and [place] laws. We do not discriminate on the basis of race, color, national origin, age, disability, sex, sexual orientation, or gender identity.            Thank you!     Thank you for choosing [facility]  for your care. Our goal is always to provide you with excellent care. Hearing back from our patients is one way we can continue to improve our services. Please take a few minutes to  complete the written survey that you may receive in the mail after your visit with us. Thank you!             Your Updated Medication List - Protect others around you: Learn how to safely use, store and throw away your medicines at www.disposemymeds.org.          This list is accurate as of 11/19/18 11:07 AM.  Always use your most recent med list.                   Brand Name Dispense Instructions for use Diagnosis    FISH OIL PO           levothyroxine 137 MCG tablet    SYNTHROID/LEVOTHROID    30 tablet    Take 1 tablet (137 mcg) by mouth daily    Hypothyroidism affecting pregnancy in first trimester       prenatal multivitamin plus iron 27-0.8 MG Tabs per tablet     100 tablet    Take 1 tablet by mouth daily

## 2018-11-19 NOTE — PATIENT INSTRUCTIONS
Weeks 17 thru 20 - Gestational Age (Fetal Age - Weeks 15 thru 18):  The baby has reached a point where movements are being felt more often by the mother. The eyebrows and eyelashes grow in, and tiny nails have begun to grow on the fingers and toes. The skin of the fetus is going through many changes and begins to produce vernix at the twentieth week. Vernix is a white pasty substance that covers the fetus  skin to protect it from amniotic fluid. Your baby can now hear your voices and music.  A fetal heartbeat can be heard by a stethoscope now. The fetus has reached a length of 8 inches and weighs about 12 ounces.

## 2018-11-19 NOTE — PROGRESS NOTES
S:Feels well and has no concerns. Still waiting for records from [facility] regarding history of pelvic fracture.  Fetal movement No  Denies loss of fluid/vb/contractions/pelvic pain  Depression screening done  O:  /56 (BP Location: Right arm, Cuff Size: Adult Regular)  Wt 124 lb (56.2 kg)  LMP 07/17/2018  BMI 23.43 kg/m2  Exam:  Constitutional: healthy, alert and no distress  Respiratory: Respirations even and unlabored  Gastrointestinal: Abdomen soft, non-tender. Fundus measures appropriately for gestational age. Fetal heart tones heard easily.  Psychiatric: mentation appears normal and affect normal/bright  A:    Diagnosis Comments   1. Encounter for supervision of normal first pregnancy in second trimester  US OB > 14 Weeks      P: Discussed options for quad screen today  declined quad screen today  Anatomy ultrasound next visit between 20-22 weeks  Return to clinic 4 weeks    CHALINO Kuo, CNM

## 2018-11-30 DIAGNOSIS — E03.9 HYPOTHYROIDISM AFFECTING PREGNANCY IN FIRST TRIMESTER: ICD-10-CM

## 2018-11-30 DIAGNOSIS — O99.281 HYPOTHYROIDISM AFFECTING PREGNANCY IN FIRST TRIMESTER: ICD-10-CM

## 2018-11-30 LAB
T4 FREE SERPL-MCNC: 1.25 NG/DL (ref 0.76–1.46)
TSH SERPL DL<=0.005 MIU/L-ACNC: 2.44 MU/L (ref 0.4–4)

## 2018-11-30 PROCEDURE — 84439 ASSAY OF FREE THYROXINE: CPT | Performed by: CLINICAL NURSE SPECIALIST

## 2018-11-30 PROCEDURE — 84443 ASSAY THYROID STIM HORMONE: CPT | Performed by: CLINICAL NURSE SPECIALIST

## 2018-11-30 PROCEDURE — 36415 COLL VENOUS BLD VENIPUNCTURE: CPT | Performed by: CLINICAL NURSE SPECIALIST

## 2018-12-01 NOTE — PROGRESS NOTES
Yary,  Your TSH is in target range for second trimester pregnancy (TSH goal less than 3.0)  Please continue levothyroxine 137 mcg/day.  Repeat labs in one month.  Azalea Burk NP  Endocrinology

## 2018-12-06 ENCOUNTER — RADIANT APPOINTMENT (OUTPATIENT)
Dept: ULTRASOUND IMAGING | Facility: CLINIC | Age: 28
End: 2018-12-06
Attending: ADVANCED PRACTICE MIDWIFE
Payer: COMMERCIAL

## 2018-12-06 DIAGNOSIS — Z34.02 ENCOUNTER FOR SUPERVISION OF NORMAL FIRST PREGNANCY IN SECOND TRIMESTER: ICD-10-CM

## 2018-12-06 PROCEDURE — 76805 OB US >/= 14 WKS SNGL FETUS: CPT | Performed by: FAMILY MEDICINE

## 2018-12-11 ENCOUNTER — MYC MEDICAL ADVICE (OUTPATIENT)
Dept: OBGYN | Facility: CLINIC | Age: 28
End: 2018-12-11

## 2018-12-19 ENCOUNTER — PRENATAL OFFICE VISIT (OUTPATIENT)
Dept: OBGYN | Facility: CLINIC | Age: 28
End: 2018-12-19
Payer: COMMERCIAL

## 2018-12-19 VITALS — BODY MASS INDEX: 24.37 KG/M2 | WEIGHT: 129 LBS | SYSTOLIC BLOOD PRESSURE: 112 MMHG | DIASTOLIC BLOOD PRESSURE: 62 MMHG

## 2018-12-19 DIAGNOSIS — Z34.02 ENCOUNTER FOR SUPERVISION OF NORMAL FIRST PREGNANCY IN SECOND TRIMESTER: Primary | ICD-10-CM

## 2018-12-19 PROCEDURE — 59425 ANTEPARTUM CARE ONLY: CPT | Performed by: ADVANCED PRACTICE MIDWIFE

## 2018-12-19 PROCEDURE — 99207 ZZC PRENATAL VISIT: CPT | Performed by: ADVANCED PRACTICE MIDWIFE

## 2018-12-19 NOTE — NURSING NOTE
"Chief Complaint   Patient presents with     Prenatal Care     22 weeks 1 day       Initial /62 (BP Location: Right arm, Patient Position: Sitting, Cuff Size: Adult Regular)   Wt 58.5 kg (129 lb)   LMP 2018   Breastfeeding? No   BMI 24.37 kg/m   Estimated body mass index is 24.37 kg/m  as calculated from the following:    Height as of 18: 1.549 m (5' 1\").    Weight as of this encounter: 58.5 kg (129 lb).  BP completed using cuff size: regular    Questioned patient about current smoking habits.  Pt. has never smoked.          The following HM Due: NONE    22w1d  Rd Wetzel CMA                "

## 2018-12-19 NOTE — PROGRESS NOTES
S: Feels well. Still waiting for [facility] to fax records regarding pelvic fracture.   Baby active.  Denies uterine cramping, vaginal bleeding or leaking of fluid  O: Vitals: /62 (BP Location: Right arm, Patient Position: Sitting, Cuff Size: Adult Regular)   Wt 58.5 kg (129 lb)   LMP 07/17/2018   Breastfeeding? No   BMI 24.37 kg/m    BMI= Body mass index is 24.37 kg/m .  Exam:  Constitutional: healthy, alert and no distress  Respiratory: respirations even and unlabored  Gastrointestinal: Abdomen soft, non-tender. Fundus measures appropriate for gestational age. Fetal heart tones hear without difficulty and within normal limits  : Deferred  Psychiatric: mentation appears normal and affect normal/bright  A:    Diagnosis Comments   1. Encounter for supervision of normal first pregnancy in second trimester       P: Discussed GCT/repeat RPR for next visit, handout provided, reminded of longer appointment.  Tdap next visit; reviewed CDC recommendations and partner/family vaccination recommended as well.  Need for Rhogam? No, to be done next visit   Encouraged patient to call with any questions or concerns.  Return to clinic 4 weeks    Estelita Knowles CNM, HAM-BC

## 2019-01-03 DIAGNOSIS — E03.9 HYPOTHYROIDISM AFFECTING PREGNANCY IN FIRST TRIMESTER: ICD-10-CM

## 2019-01-03 DIAGNOSIS — O99.281 HYPOTHYROIDISM AFFECTING PREGNANCY IN FIRST TRIMESTER: ICD-10-CM

## 2019-01-03 PROCEDURE — 84439 ASSAY OF FREE THYROXINE: CPT | Performed by: CLINICAL NURSE SPECIALIST

## 2019-01-03 PROCEDURE — 84443 ASSAY THYROID STIM HORMONE: CPT | Performed by: CLINICAL NURSE SPECIALIST

## 2019-01-03 PROCEDURE — 36415 COLL VENOUS BLD VENIPUNCTURE: CPT | Performed by: CLINICAL NURSE SPECIALIST

## 2019-01-04 LAB
T4 FREE SERPL-MCNC: 1.25 NG/DL (ref 0.76–1.46)
TSH SERPL DL<=0.005 MIU/L-ACNC: 0.84 MU/L (ref 0.4–4)

## 2019-01-08 ENCOUNTER — MYC REFILL (OUTPATIENT)
Dept: ENDOCRINOLOGY | Facility: CLINIC | Age: 29
End: 2019-01-08

## 2019-01-08 DIAGNOSIS — O99.281 HYPOTHYROIDISM AFFECTING PREGNANCY IN FIRST TRIMESTER: ICD-10-CM

## 2019-01-08 DIAGNOSIS — E03.9 HYPOTHYROIDISM AFFECTING PREGNANCY IN FIRST TRIMESTER: ICD-10-CM

## 2019-01-08 RX ORDER — LEVOTHYROXINE SODIUM 137 UG/1
137 TABLET ORAL DAILY
Qty: 30 TABLET | Refills: 0 | Status: SHIPPED | OUTPATIENT
Start: 2019-01-08 | End: 2019-01-09

## 2019-01-08 NOTE — RESULT ENCOUNTER NOTE
Yary,  Your thyroid levels are in target range for pregnancy.  Continue the current dose of levothyroxine  Azalea Burk NP  Endocrinology

## 2019-01-08 NOTE — TELEPHONE ENCOUNTER
Result note from yesterday to continue same dose.  One refill sent.  Chelsea Monique RN    Viewed by Yary Muñoz on 1/8/2019 10:22 AM   Written by Azalea Burk APRN CNP on 1/7/2019  9:55 PM   Yary,   Your thyroid levels are in target range for pregnancy.   Continue the current dose of levothyroxine   Azalea Burk NP   Endocrinology

## 2019-01-08 NOTE — TELEPHONE ENCOUNTER
"Last Written Prescription Date:  11/05/18  Last Fill Quantity: 30 tablet,  # refills: 1   Last office visit: 8/31/2018 with prescribing provider:  NATALY Burk   Future Office Visit:   Next 5 appointments (look out 90 days)    Jan 18, 2019  9:15 AM CST  ESTABLISHED PRENATAL with Vivian Wetzel CNM  [facility] ([facility] [address]  [address]  [facility] [id number] [phone]         Requested Prescriptions   Pending Prescriptions Disp Refills     levothyroxine (SYNTHROID/LEVOTHROID) 137 MCG tablet 30 tablet 1     Sig: Take 1 tablet (137 mcg) by mouth daily    Thyroid Protocol Failed - 1/8/2019  1:23 PM       Failed - No active pregnancy on record    If patient is pregnant or has had a positive pregnancy test, please check TSH.         Failed - No positive pregnancy test in past 12 months    If patient is pregnant or has had a positive pregnancy test, please check TSH.         Passed - Patient is 12 years or older       Passed - Recent (12 mo) or future (30 days) visit within the authorizing provider's specialty    Patient had office visit in the last 12 months or has a visit in the next 30 days with authorizing provider or within the authorizing provider's specialty.  See \"Patient Info\" tab in inbasket, or \"Choose Columns\" in Meds & Orders section of the refill encounter.             Passed - Medication is active on med list       Passed - Normal TSH on file in past 12 months    Recent Labs   Lab Test 01/03/19  1132   TSH 0.84                "

## 2019-01-09 RX ORDER — LEVOTHYROXINE SODIUM 137 UG/1
137 TABLET ORAL DAILY
Qty: 90 TABLET | Refills: 0 | Status: SHIPPED | OUTPATIENT
Start: 2019-01-09 | End: 2019-02-08 | Stop reason: DRUGHIGH

## 2019-01-23 ENCOUNTER — PRENATAL OFFICE VISIT (OUTPATIENT)
Dept: OBGYN | Facility: CLINIC | Age: 29
End: 2019-01-23
Payer: COMMERCIAL

## 2019-01-23 VITALS — DIASTOLIC BLOOD PRESSURE: 50 MMHG | WEIGHT: 134 LBS | BODY MASS INDEX: 25.32 KG/M2 | SYSTOLIC BLOOD PRESSURE: 108 MMHG

## 2019-01-23 DIAGNOSIS — Z23 NEED FOR DIPHTHERIA-TETANUS-PERTUSSIS (TDAP) VACCINE: ICD-10-CM

## 2019-01-23 DIAGNOSIS — Z34.02 ENCOUNTER FOR SUPERVISION OF NORMAL FIRST PREGNANCY IN SECOND TRIMESTER: Primary | ICD-10-CM

## 2019-01-23 LAB
ERYTHROCYTE [DISTWIDTH] IN BLOOD BY AUTOMATED COUNT: 13.1 % (ref 10–15)
HCT VFR BLD AUTO: 35.4 % (ref 35–47)
HGB BLD-MCNC: 11.8 G/DL (ref 11.7–15.7)
MCH RBC QN AUTO: 30.9 PG (ref 26.5–33)
MCHC RBC AUTO-ENTMCNC: 33.3 G/DL (ref 31.5–36.5)
MCV RBC AUTO: 93 FL (ref 78–100)
PLATELET # BLD AUTO: 240 10E9/L (ref 150–450)
RBC # BLD AUTO: 3.82 10E12/L (ref 3.8–5.2)
WBC # BLD AUTO: 9.1 10E9/L (ref 4–11)

## 2019-01-23 PROCEDURE — 82950 GLUCOSE TEST: CPT | Performed by: ADVANCED PRACTICE MIDWIFE

## 2019-01-23 PROCEDURE — 90715 TDAP VACCINE 7 YRS/> IM: CPT | Performed by: ADVANCED PRACTICE MIDWIFE

## 2019-01-23 PROCEDURE — 85027 COMPLETE CBC AUTOMATED: CPT | Performed by: ADVANCED PRACTICE MIDWIFE

## 2019-01-23 PROCEDURE — 36415 COLL VENOUS BLD VENIPUNCTURE: CPT | Performed by: ADVANCED PRACTICE MIDWIFE

## 2019-01-23 PROCEDURE — 86780 TREPONEMA PALLIDUM: CPT | Performed by: ADVANCED PRACTICE MIDWIFE

## 2019-01-23 PROCEDURE — 90471 IMMUNIZATION ADMIN: CPT | Performed by: ADVANCED PRACTICE MIDWIFE

## 2019-01-23 PROCEDURE — 99207 ZZC PRENATAL VISIT: CPT | Performed by: ADVANCED PRACTICE MIDWIFE

## 2019-01-23 NOTE — NURSING NOTE
"Chief Complaint   Patient presents with     Prenatal Care     27w 1d, no concerns, GCT today       Initial /50 (BP Location: Right arm, Cuff Size: Adult Regular)   Wt 60.8 kg (134 lb)   LMP 2018   BMI 25.32 kg/m   Estimated body mass index is 25.32 kg/m  as calculated from the following:    Height as of 18: 1.549 m (5' 1\").    Weight as of this encounter: 60.8 kg (134 lb).  BP completed using cuff size: regular    Questioned patient about current smoking habits.  Pt. has never smoked.          The following HM Due: Vaccinations: tdap    Nilda Corley, CMA               "

## 2019-01-23 NOTE — PROGRESS NOTES
S: Feels well. Taking a childbirth education class online with her . Planning no epidural for labor. Reviewed records from pelvic fracture at age 15. No expected complications, fractures were stable.   Baby active.  Denies uterine cramping, vaginal bleeding or leaking of fluid  O: Vitals: /50 (BP Location: Right arm, Cuff Size: Adult Regular)   Wt 60.8 kg (134 lb)   LMP 2018   BMI 25.32 kg/m    BMI= Body mass index is 25.32 kg/m .  Exam:  Constitutional: healthy, alert and no distress  Respiratory: respirations even and unlabored  Gastrointestinal: Abdomen soft, non-tender. Fundus measures appropriate for gestational age. Fetal heart tones hear without difficulty and within normal limits  : Deferred  Psychiatric: mentation appears normal and affect normal/bright  A:     ICD-10-CM    1. Encounter for supervision of normal first pregnancy in second trimester Z34.02 Glucose tolerance, gest screen, 1 hour     CBC with platelets     Treponema Abs w Reflex to RPR and Titer   2. Need for diphtheria-tetanus-pertussis (Tdap) vaccine Z23 TDAP, IM (10 - 64 YRS) - Adacel     ADMIN 1st VACCINE     P: GCT/repeat RPR today, handout provided.  Tdap given; reviewed CDC recommendations and partner/family vaccination recommended as well.  Need for Rhogam? No.   Discussed 28 week folder- breastfeeding,  LABOR, kick counts   Reviewed comfort measures for labor and positions  Encouraged patient to call with any questions or concerns.  Return to clinic 2 weeks    Estelita Knowles CNM, HAM-BC

## 2019-01-24 LAB
GLUCOSE 1H P 50 G GLC PO SERPL-MCNC: 119 MG/DL (ref 60–129)
T PALLIDUM AB SER QL: NONREACTIVE

## 2019-02-05 DIAGNOSIS — E03.9 HYPOTHYROIDISM AFFECTING PREGNANCY IN FIRST TRIMESTER: ICD-10-CM

## 2019-02-05 DIAGNOSIS — O99.281 HYPOTHYROIDISM AFFECTING PREGNANCY IN FIRST TRIMESTER: ICD-10-CM

## 2019-02-05 PROCEDURE — 36415 COLL VENOUS BLD VENIPUNCTURE: CPT | Performed by: CLINICAL NURSE SPECIALIST

## 2019-02-05 PROCEDURE — 84443 ASSAY THYROID STIM HORMONE: CPT | Performed by: CLINICAL NURSE SPECIALIST

## 2019-02-05 PROCEDURE — 84439 ASSAY OF FREE THYROXINE: CPT | Performed by: CLINICAL NURSE SPECIALIST

## 2019-02-06 ENCOUNTER — PRENATAL OFFICE VISIT (OUTPATIENT)
Dept: OBGYN | Facility: CLINIC | Age: 29
End: 2019-02-06
Payer: COMMERCIAL

## 2019-02-06 VITALS — WEIGHT: 134 LBS | BODY MASS INDEX: 25.32 KG/M2 | SYSTOLIC BLOOD PRESSURE: 108 MMHG | DIASTOLIC BLOOD PRESSURE: 60 MMHG

## 2019-02-06 DIAGNOSIS — Z34.03 ENCOUNTER FOR SUPERVISION OF NORMAL FIRST PREGNANCY IN THIRD TRIMESTER: Primary | ICD-10-CM

## 2019-02-06 LAB
T4 FREE SERPL-MCNC: 1.22 NG/DL (ref 0.76–1.46)
TSH SERPL DL<=0.005 MIU/L-ACNC: 3.59 MU/L (ref 0.4–4)

## 2019-02-06 PROCEDURE — 99207 ZZC PRENATAL VISIT: CPT | Performed by: ADVANCED PRACTICE MIDWIFE

## 2019-02-06 NOTE — PROGRESS NOTES
S: Feels well,  Baby active.  Denies uterine cramping, vaginal bleeding or leaking of fluid.   O: Vitals: /60 (BP Location: Left arm, Patient Position: Sitting, Cuff Size: Adult Regular)   Wt 60.8 kg (134 lb)   LMP 07/17/2018   BMI 25.32 kg/m    BMI= Body mass index is 25.32 kg/m .  Exam:  Constitutional: healthy, alert and no distress  Respiratory: respirations even and unlabored  Gastrointestinal: Abdomen soft, non-tender. Fundus measures appropriate for gestational age. Fetal heart tones hear without difficulty and within normal limits  : Deferred  Psychiatric: mentation appears normal and affect normal/bright  A: (Z34.03) Encounter for supervision of normal first pregnancy in third trimester  (primary encounter diagnosis)  Comment: ordered   Plan: return to clinic 2 weeks     P: Discussed plans for labor.   Plans to breastfeed   Encouraged patient to call with any questions or concerns.  Return to clinic 2 weeks    Yakelin Mendez, SUHA, APRN, CNM

## 2019-02-06 NOTE — NURSING NOTE
"Chief Complaint   Patient presents with     Prenatal Care     29 weeks 1 day- no concerns        Initial /60 (BP Location: Left arm, Patient Position: Sitting, Cuff Size: Adult Regular)   Wt 60.8 kg (134 lb)   LMP 2018   BMI 25.32 kg/m   Estimated body mass index is 25.32 kg/m  as calculated from the following:    Height as of 18: 1.549 m (5' 1\").    Weight as of this encounter: 60.8 kg (134 lb).  BP completed using cuff size: regular    Questioned patient about current smoking habits.  Pt. has never smoked.          The following HM Due: NONE    29w1d  Rd Wetzel CMA                "

## 2019-02-08 ENCOUNTER — TELEPHONE (OUTPATIENT)
Dept: ENDOCRINOLOGY | Facility: CLINIC | Age: 29
End: 2019-02-08

## 2019-02-08 DIAGNOSIS — E03.9 HYPOTHYROIDISM AFFECTING PREGNANCY IN FIRST TRIMESTER: Primary | ICD-10-CM

## 2019-02-08 DIAGNOSIS — O99.281 HYPOTHYROIDISM AFFECTING PREGNANCY IN FIRST TRIMESTER: Primary | ICD-10-CM

## 2019-02-08 RX ORDER — LEVOTHYROXINE SODIUM 150 UG/1
150 TABLET ORAL DAILY
Qty: 30 TABLET | Refills: 0 | Status: SHIPPED | OUTPATIENT
Start: 2019-02-08 | End: 2019-03-10

## 2019-02-08 NOTE — RESULT ENCOUNTER NOTE
Please call and confirm her current dose of levothyroxine is 137 mcg/day.  Let her know the TSH is a little above target range so I am increasing her levothyroxine dose to 150 mcg/day.  She should recheck labs in another 4 weeks.  Azalea Burk NP  Endocrinology

## 2019-02-08 NOTE — TELEPHONE ENCOUNTER
Patient calling back.  Discussed below.  Is on Levothyroxine 137 mcg.  Will go to Levothyroxine 150 mcg and come for lab in 4 weeks.  RX sent.  Chelsea Monique RN

## 2019-02-08 NOTE — TELEPHONE ENCOUNTER
Notes recorded by Chelsea Monique RN on 2/8/2019 at 2:04 PM CST  L/M to call.  See telephone encounter.  Chelsea Monique RN    ------    Notes recorded by Azalea Burk APRN CNP on 2/8/2019 at 1:57 PM CST  Please call and confirm her current dose of levothyroxine is 137 mcg/day.  Let her know the TSH is a little above target range so I am increasing her levothyroxine dose to 150 mcg/day.  She should recheck labs in another 4 weeks.  Azalea Burk, DANIELA  Endocrinology

## 2019-02-12 ENCOUNTER — MYC MEDICAL ADVICE (OUTPATIENT)
Dept: OBGYN | Facility: CLINIC | Age: 29
End: 2019-02-12

## 2019-02-12 NOTE — TELEPHONE ENCOUNTER
Copied from routing comment:    Called patient and LM to determine where she would like to  prescription or told her I would be happy to prescribe it for her with her next visit with me on 2/22/19.     Vivian Wetzel CNM on 2/12/2019 at 1:15 PM (Routing comment)

## 2019-02-12 NOTE — TELEPHONE ENCOUNTER
Pt requests rx for breast pump.  Some places want a signature on it.  Can you print and sign for pt?  I can let her know once that is done.    Jessica KENT R.N.  [facility]

## 2019-02-22 ENCOUNTER — PRENATAL OFFICE VISIT (OUTPATIENT)
Dept: OBGYN | Facility: CLINIC | Age: 29
End: 2019-02-22
Payer: COMMERCIAL

## 2019-02-22 VITALS — BODY MASS INDEX: 25.89 KG/M2 | SYSTOLIC BLOOD PRESSURE: 108 MMHG | WEIGHT: 137 LBS | DIASTOLIC BLOOD PRESSURE: 68 MMHG

## 2019-02-22 DIAGNOSIS — Z34.03 ENCOUNTER FOR SUPERVISION OF NORMAL FIRST PREGNANCY IN THIRD TRIMESTER: Primary | ICD-10-CM

## 2019-02-22 PROCEDURE — 99207 ZZC PRENATAL VISIT: CPT | Performed by: ADVANCED PRACTICE MIDWIFE

## 2019-02-22 RX ORDER — BREAST PUMP
1 EACH MISCELLANEOUS PRN
Qty: 1 EACH | Refills: 0 | Status: SHIPPED | OUTPATIENT
Start: 2019-02-22 | End: 2021-06-15

## 2019-02-22 NOTE — NURSING NOTE
"Chief Complaint   Patient presents with     Prenatal Care     31 weeks 3 days- no concerns        Initial /68 (BP Location: Left arm, Patient Position: Sitting, Cuff Size: Adult Regular)   Wt 62.1 kg (137 lb)   LMP 2018   BMI 25.89 kg/m   Estimated body mass index is 25.89 kg/m  as calculated from the following:    Height as of 18: 1.549 m (5' 1\").    Weight as of this encounter: 62.1 kg (137 lb).  BP completed using cuff size: regular    Questioned patient about current smoking habits.  Pt. has never smoked.          The following HM Due: NONE    31w3d  Rd Wetzel CMA                "

## 2019-02-22 NOTE — PROGRESS NOTES
S: Feels good,  Baby active.  Denies uterine cramping, vaginal bleeding or leaking of fluid. Requesting breast pump prescription. Had questions about fetal monitoring while in labor  O: Vitals: /68 (BP Location: Left arm, Patient Position: Sitting, Cuff Size: Adult Regular)   Wt 62.1 kg (137 lb)   LMP 07/17/2018   BMI 25.89 kg/m    BMI= Body mass index is 25.89 kg/m .  Exam:  Constitutional: healthy, alert and no distress  Respiratory: respirations even and unlabored  Gastrointestinal: Abdomen soft, non-tender. Fundus measures appropriate for gestational age. Fetal heart tones hear without difficulty and within normal limits  : Deferred  Psychiatric: mentation appears normal and affect normal/bright  A:     ICD-10-CM    1. Encounter for supervision of normal first pregnancy in third trimester Z34.03 Misc. Devices (BREAST PUMP) MISC     P: Discussed plans for labor. Hoping for low intervention, but open. Reviewed intermittent monitoring vs continuous.  Discussed patient options for postpartum contraception. Patient is planning unsure. Used OCPs prior to pregnancy.  Encouraged patient to call with any questions or concerns.  Return to clinic 2 weeks    Vivian Wetzel CNM on 2/22/2019 at 9:53 AM

## 2019-03-06 ENCOUNTER — PRENATAL OFFICE VISIT (OUTPATIENT)
Dept: OBGYN | Facility: CLINIC | Age: 29
End: 2019-03-06
Payer: COMMERCIAL

## 2019-03-06 VITALS — DIASTOLIC BLOOD PRESSURE: 62 MMHG | BODY MASS INDEX: 26.24 KG/M2 | SYSTOLIC BLOOD PRESSURE: 104 MMHG | WEIGHT: 138.9 LBS

## 2019-03-06 DIAGNOSIS — Z34.03 ENCOUNTER FOR SUPERVISION OF NORMAL FIRST PREGNANCY IN THIRD TRIMESTER: Primary | ICD-10-CM

## 2019-03-06 PROCEDURE — 99207 ZZC PRENATAL VISIT: CPT | Performed by: ADVANCED PRACTICE MIDWIFE

## 2019-03-06 NOTE — PATIENT INSTRUCTIONS
Weeks 33 thru 36 - Gestational Age (Fetal Age - Weeks 31 thru 34):  This is about the time that the fetus will descend into the head down position preparing for birth. The fetus is beginning to gain weight more rapidly. The lanugo hair will disappear from the skin, and it is becoming less red and wrinkled. The fetus is now 16-19 inches and weighs anywhere from 5   lbs to 6   lbs.      GROUP B STREP    Group B Strep (GBS) is a common bacteria that is sometimes found in the vagina, urinary tract or rectum.  It is not harmful typically to adults but can cause serious illness in newborns.  It occasionally is passed from mother to baby during birth.   It is important to test in pregnancy.  When a woman is found to be positive for GBS, either at the first prenatal visit or by taking a culture at 36 weeks, treatment will be offered to reduce the chance of spreading the bacteria to the baby.     Treatment consists of either oral antibiotics early in pregnancy or antibiotics given by IV during labor if testing is positive at 36 weeks.    Even without treatment the baby rarely (1-2% of the time) gets infected.  With treatment the baby almost never gets infected.     There really isn't anything you can do to keep from getting or being positive for GBS.  It isn't sexually transmitted and there are no symptoms if you are positive.     Your midwife will discuss your results with you and make recommendations for treatment.

## 2019-03-06 NOTE — NURSING NOTE
"Chief Complaint   Patient presents with     Prenatal Care     33 weeks 1 day, no questions or concerns. No c/o vaginal bleeding, leaking of fluids, contractions. +FM daily.       Initial /62 (BP Location: Left arm, Patient Position: Chair, Cuff Size: Adult Regular)   Wt 63 kg (138 lb 14.4 oz)   LMP 2018   BMI 26.24 kg/m   Estimated body mass index is 26.24 kg/m  as calculated from the following:    Height as of 18: 1.549 m (5' 1\").    Weight as of this encounter: 63 kg (138 lb 14.4 oz).  BP completed using cuff size: regular    Questioned patient about current smoking habits.  Pt. has never smoked.          The following HM Due: NONE      Eugenia Aburto CMA               "

## 2019-03-06 NOTE — PROGRESS NOTES
S: Feels well and has no concerns.  Baby active.  Denies uterine cramping, vaginal bleeding or leaking of fluid  O: Vitals: /62 (BP Location: Left arm, Patient Position: Chair, Cuff Size: Adult Regular)   Wt 63 kg (138 lb 14.4 oz)   LMP 07/17/2018   BMI 26.24 kg/m    BMI= Body mass index is 26.24 kg/m .  Exam:  Constitutional: healthy, alert and no distress  Respiratory: respirations even and unlabored  Gastrointestinal: Abdomen soft, non-tender. Fundus measures appropriate for gestational age. Fetal heart tones hear without difficulty and within normal limits  : Deferred  Psychiatric: mentation appears normal and affect normal/bright  A:     ICD-10-CM    1. Encounter for supervision of normal first pregnancy in third trimester Z34.03      P: Discussed GBS screen for next visit. Discussed plans for labor.   Encouraged patient to call with any questions or concerns.  Return to clinic 2 weeks    CHALINO Kuo, JERICA

## 2019-03-12 DIAGNOSIS — E03.9 HYPOTHYROIDISM AFFECTING PREGNANCY IN FIRST TRIMESTER: ICD-10-CM

## 2019-03-12 DIAGNOSIS — O99.281 HYPOTHYROIDISM AFFECTING PREGNANCY IN FIRST TRIMESTER: ICD-10-CM

## 2019-03-12 PROCEDURE — 36415 COLL VENOUS BLD VENIPUNCTURE: CPT | Performed by: CLINICAL NURSE SPECIALIST

## 2019-03-12 PROCEDURE — 84443 ASSAY THYROID STIM HORMONE: CPT | Performed by: CLINICAL NURSE SPECIALIST

## 2019-03-12 PROCEDURE — 84439 ASSAY OF FREE THYROXINE: CPT | Performed by: CLINICAL NURSE SPECIALIST

## 2019-03-13 LAB
T4 FREE SERPL-MCNC: 1.2 NG/DL (ref 0.76–1.46)
TSH SERPL DL<=0.005 MIU/L-ACNC: 1.17 MU/L (ref 0.4–4)

## 2019-03-14 ENCOUNTER — TELEPHONE (OUTPATIENT)
Dept: ENDOCRINOLOGY | Facility: CLINIC | Age: 29
End: 2019-03-14

## 2019-03-14 RX ORDER — LEVOTHYROXINE SODIUM 150 UG/1
150 TABLET ORAL DAILY
Qty: 90 TABLET | Refills: 0 | Status: SHIPPED | OUTPATIENT
Start: 2019-03-14 | End: 2019-06-14

## 2019-03-14 NOTE — TELEPHONE ENCOUNTER
LMTCB-    When calls back please give message.       Please call  Yary,  Your TSH is in target range for pregnancy.  Please continue levothyroxine 150 mcg per day.  I'll renew your prescription.  I recommend you schedule a follow up visit in 3 months - you should be postpartum so we can make sure levels are still in normal range.  Azalea Burk NP  Endocrinology    Krys Zarate RN Flex

## 2019-03-14 NOTE — RESULT ENCOUNTER NOTE
Please call  Yary,  Your TSH is in target range for pregnancy.  Please continue levothyroxine 150 mcg per day.  I'll renew your prescription.  I recommend you schedule a follow up visit in 3 months - you should be postpartum so we can make sure levels are still in normal range.  Azalea Burk NP  Endocrinology

## 2019-03-20 ENCOUNTER — PRENATAL OFFICE VISIT (OUTPATIENT)
Dept: OBGYN | Facility: CLINIC | Age: 29
End: 2019-03-20
Payer: COMMERCIAL

## 2019-03-20 VITALS — BODY MASS INDEX: 26.26 KG/M2 | DIASTOLIC BLOOD PRESSURE: 52 MMHG | WEIGHT: 139 LBS | SYSTOLIC BLOOD PRESSURE: 102 MMHG

## 2019-03-20 DIAGNOSIS — Z34.03 ENCOUNTER FOR SUPERVISION OF NORMAL FIRST PREGNANCY IN THIRD TRIMESTER: Primary | ICD-10-CM

## 2019-03-20 PROCEDURE — 99207 ZZC PRENATAL VISIT: CPT | Performed by: ADVANCED PRACTICE MIDWIFE

## 2019-03-20 PROCEDURE — 87653 STREP B DNA AMP PROBE: CPT | Performed by: ADVANCED PRACTICE MIDWIFE

## 2019-03-20 NOTE — PROGRESS NOTES
S: Feels well,  Baby active.  Denies uterine cramping, vaginal bleeding or leaking of fluid. No headache, increase in edema, no epigastric pain.   O: Vitals: /52 (BP Location: Right arm, Patient Position: Sitting, Cuff Size: Adult Regular)   Wt 63 kg (139 lb)   LMP 07/17/2018   BMI 26.26 kg/m    BMI= Body mass index is 26.26 kg/m .  Exam:  Constitutional: healthy, alert and no distress  Respiratory: respirations even and unlabored  Gastrointestinal: Abdomen soft, non-tender. Fundus measures appropriate for gestational age. Fetal heart tones hear without difficulty and within normal limits  : Normal external genitalia without lesions  Psychiatric: mentation appears normal and affect normal/bright  A:     ICD-10-CM    1. Encounter for supervision of normal first pregnancy in third trimester Z34.03 Group B strep PCR     P: Labor signs and symptoms discussed, aware of numbers to call  Discussed warning signs of PIH/preeclampsia and patient will monitor.  GBS screen completed today.   Discussed plans for labor.   Encouraged patient to call with any questions or concerns.  Return to clinic 1 weeks    Na Hua CNM

## 2019-03-20 NOTE — NURSING NOTE
"Chief Complaint   Patient presents with     Prenatal Care     35 weeks 1 day- no concerns        Initial /52 (BP Location: Right arm, Patient Position: Sitting, Cuff Size: Adult Regular)   Wt 63 kg (139 lb)   LMP 2018   BMI 26.26 kg/m   Estimated body mass index is 26.26 kg/m  as calculated from the following:    Height as of 18: 1.549 m (5' 1\").    Weight as of this encounter: 63 kg (139 lb).  BP completed using cuff size: regular    Questioned patient about current smoking habits.  Pt. has never smoked.          The following HM Due: NONE    35w1d  Rd Wetzel CMA                "

## 2019-03-21 LAB
GP B STREP DNA SPEC QL NAA+PROBE: NEGATIVE
SPECIMEN SOURCE: NORMAL

## 2019-03-27 ENCOUNTER — PRENATAL OFFICE VISIT (OUTPATIENT)
Dept: OBGYN | Facility: CLINIC | Age: 29
End: 2019-03-27
Payer: COMMERCIAL

## 2019-03-27 VITALS — SYSTOLIC BLOOD PRESSURE: 104 MMHG | DIASTOLIC BLOOD PRESSURE: 62 MMHG | WEIGHT: 141 LBS | BODY MASS INDEX: 26.64 KG/M2

## 2019-03-27 DIAGNOSIS — Z34.03 ENCOUNTER FOR SUPERVISION OF NORMAL FIRST PREGNANCY IN THIRD TRIMESTER: Primary | ICD-10-CM

## 2019-03-27 PROCEDURE — 99207 ZZC PRENATAL VISIT: CPT | Performed by: ADVANCED PRACTICE MIDWIFE

## 2019-03-27 NOTE — NURSING NOTE
"Chief Complaint   Patient presents with     Prenatal Care     36w 1d, yuniel ken, no concerns       Initial /62 (BP Location: Right arm, Cuff Size: Adult Regular)   Wt 64 kg (141 lb)   LMP 2018   BMI 26.64 kg/m   Estimated body mass index is 26.64 kg/m  as calculated from the following:    Height as of 18: 1.549 m (5' 1\").    Weight as of this encounter: 64 kg (141 lb).  BP completed using cuff size: regular    Questioned patient about current smoking habits.  Pt. has never smoked.          The following HM Due: NONE  Lynette Corley CMA           "

## 2019-03-27 NOTE — PROGRESS NOTES
S: Feels well,  Baby active.  Denies uterine cramping, vaginal bleeding or leaking of fluid. No headache, increase in edema, no epigastric pain.   O: Vitals: /62 (BP Location: Right arm, Cuff Size: Adult Regular)   Wt 64 kg (141 lb)   LMP 07/17/2018   BMI 26.64 kg/m    BMI= Body mass index is 26.64 kg/m .  Exam:  Constitutional: healthy, alert and no distress  Respiratory: respirations even and unlabored  Gastrointestinal: Abdomen soft, non-tender. Fundus measures appropriate for gestational age. Fetal heart tones hear without difficulty and within normal limits  : Deferred  Psychiatric: mentation appears normal and affect normal/bright  A: (Z34.03) Encounter for supervision of normal first pregnancy in third trimester  (primary encounter diagnosis)  P: Labor signs and symptoms discussed, aware of numbers to call  Discussed warning signs of PIH/preeclampsia and patient will monitor.  GBS screen completed, negative.   Discussed plans for labor.   Encouraged patient to call with any questions or concerns.  Return to clinic 1 weeks    Na Hua CNM

## 2019-04-05 ENCOUNTER — PRENATAL OFFICE VISIT (OUTPATIENT)
Dept: OBGYN | Facility: CLINIC | Age: 29
End: 2019-04-05
Payer: COMMERCIAL

## 2019-04-05 VITALS — DIASTOLIC BLOOD PRESSURE: 64 MMHG | SYSTOLIC BLOOD PRESSURE: 100 MMHG | BODY MASS INDEX: 26.83 KG/M2 | WEIGHT: 142 LBS

## 2019-04-05 DIAGNOSIS — Z34.03 ENCOUNTER FOR SUPERVISION OF NORMAL FIRST PREGNANCY IN THIRD TRIMESTER: Primary | ICD-10-CM

## 2019-04-05 PROCEDURE — 99207 ZZC PRENATAL VISIT: CPT | Performed by: ADVANCED PRACTICE MIDWIFE

## 2019-04-05 NOTE — TELEPHONE ENCOUNTER
Pt returned call-informed of message. Pt scheduled 06/12/19 w/Azalea Burk.    Jennyfer Trujillo/JONNY

## 2019-04-05 NOTE — PROGRESS NOTES
S: Feels goods, no concerns or questions.  Baby active.  Reports occasional uterine cramping, denies vaginal bleeding or leaking of fluid. No headache, increase in edema, no epigastric pain.   O: Vitals: /64 (BP Location: Right arm, Cuff Size: Adult Regular)   Wt 64.4 kg (142 lb)   LMP 07/17/2018   BMI 26.83 kg/m    BMI= Body mass index is 26.83 kg/m .  Exam:  Constitutional: healthy, alert and no distress  Respiratory: respirations even and unlabored  Gastrointestinal: Abdomen soft, non-tender. Fundus measures appropriate for gestational age. Fetal heart tones hear without difficulty and within normal limits  : Deferred  Psychiatric: mentation appears normal and affect normal/bright  A:  Encounter Diagnosis   Name Primary?     Encounter for supervision of normal first pregnancy in third trimester Yes     P: Labor signs and symptoms discussed, aware of numbers to call  Discussed warning signs of PIH/preeclampsia and patient will monitor.  GBS screen completed. Discussed plans for labor.   Patient unsure what she would like for contraception  Encouraged patient to call with any questions or concerns.  Return to clinic 1 weeks    Vivian Wetzel CNM on 4/5/2019 at 10:01 AM

## 2019-04-05 NOTE — NURSING NOTE
"Chief Complaint   Patient presents with     Prenatal Care     37w 3d, no concerns       Initial /64 (BP Location: Right arm, Cuff Size: Adult Regular)   Wt 64.4 kg (142 lb)   LMP 2018   BMI 26.83 kg/m   Estimated body mass index is 26.83 kg/m  as calculated from the following:    Height as of 18: 1.549 m (5' 1\").    Weight as of this encounter: 64.4 kg (142 lb).  BP completed using cuff size: regular    Questioned patient about current smoking habits.  Pt. has never smoked.          The following HM Due: NONE  Lynette Corley CMA           "

## 2019-04-10 ENCOUNTER — PRENATAL OFFICE VISIT (OUTPATIENT)
Dept: OBGYN | Facility: CLINIC | Age: 29
End: 2019-04-10
Payer: COMMERCIAL

## 2019-04-10 VITALS — BODY MASS INDEX: 27.4 KG/M2 | SYSTOLIC BLOOD PRESSURE: 100 MMHG | DIASTOLIC BLOOD PRESSURE: 60 MMHG | WEIGHT: 145 LBS

## 2019-04-10 DIAGNOSIS — Z34.03 ENCOUNTER FOR SUPERVISION OF NORMAL FIRST PREGNANCY IN THIRD TRIMESTER: Primary | ICD-10-CM

## 2019-04-10 PROCEDURE — 99207 ZZC PRENATAL VISIT: CPT | Performed by: ADVANCED PRACTICE MIDWIFE

## 2019-04-10 NOTE — PROGRESS NOTES
S: Feels well and has no concerns.  Baby active.  Denies uterine cramping, vaginal bleeding or leaking of fluid. No headache, increase in edema, no epigastric pain.   O: Vitals: /60 (BP Location: Left arm, Cuff Size: Adult Regular)   Wt 65.8 kg (145 lb)   LMP 07/17/2018   BMI 27.40 kg/m    BMI= Body mass index is 27.4 kg/m .  Exam:  Constitutional: healthy, alert and no distress  Respiratory: respirations even and unlabored  Gastrointestinal: Abdomen soft, non-tender. Fundus measures appropriate for gestational age. Fetal heart tones hear without difficulty and within normal limits  : Deferred  Psychiatric: mentation appears normal and affect normal/bright  A:     ICD-10-CM    1. Encounter for supervision of normal first pregnancy in third trimester Z34.03      P: Labor signs and symptoms discussed, aware of numbers to call  Discussed warning signs of PIH/preeclampsia and patient will monitor.  GBS screen completed. Discussed plans for labor.   Encouraged patient to call with any questions or concerns.  Return to clinic 1 weeks    CHALINO Kuo, JERICA

## 2019-04-10 NOTE — NURSING NOTE
"Chief Complaint   Patient presents with     Prenatal Care     38w 1d       Initial /60 (BP Location: Left arm, Cuff Size: Adult Regular)   Wt 65.8 kg (145 lb)   LMP 2018   BMI 27.40 kg/m   Estimated body mass index is 27.4 kg/m  as calculated from the following:    Height as of 18: 1.549 m (5' 1\").    Weight as of this encounter: 65.8 kg (145 lb).  BP completed using cuff size: regular    Questioned patient about current smoking habits.  Pt. has never smoked.          The following HM Due: NONE  Lynette Corley CMA             "

## 2019-04-10 NOTE — PATIENT INSTRUCTIONS
"Weeks 37 thru 40 - Gestational Age (Fetal Age - Weeks 35 thru 38):  At 38 weeks the fetus is considered full term and is ready to make its appearance at any time. As your baby becomes bigger, you may notice a change in fetal movement. If you notice a decrease in fetal movement, make sure to talk with your doctor. The fingernails have grown long and will need to be cut soon after birth. Small breast buds are present on both sexes. The mother is supplying the fetus with antibodies that will help protect against disease. All organs are developed, with the lungs maturing all the way until the day of delivery. The fetus is about 19 - 21 inches in length and weighs anywhere from 6   lbs to 10 lbs.  Labor Instructions for Midwife Patients    When to call:  Both during and after office hours call [phone]. There is a Nurse Midwife available to take your calls and answer your questions 24 hours a day.     When to call:  Call anytime you have important concerns about you or your baby.     Call if:    You are having contractions at regular intervals about 5-6 minutes apart lasting 30-60 seconds and becoming increasingly more intense     You have an uncontrollable gush of fluid from your vagina or feel a pop and gush like your water has broken    You have HEAVY bleeding, like heavy period, blood running down your legs, or  soaking a pad.     Some bleeding after a pelvic exam, after intercourse, or in labor when your cervix is dilating is normal and is referred to as \"bloody show\"    You have severe, continuous back or abdominal pain    You feel it is time to go to the hospital    If this is your first labor, call when contractions are very intense and have been about every 3-4 minutes for about an hour    If it is your second labor or more, call when contractions are strong and about every 3-5 minutes or sooner depending on your level of discomfort.     Keep in mind we are always here for you! If you have questions, " concerns please don't hesitate to call us.     What to eat/drink in labor: Drink plenty of fluid (water most importantly, juice, soda or tea without caffeine). Eat rice, pasta, soup, cereal, bread/toast, and fruit. Avoid dairy and greasy food as they are difficult to digest and you may experience some nausea during labor.    Comfort measures:    Baths and showers (ok even with ruptured membranes, it may temporarily slow contractions if you are still in the early stage of labor)    Warm/hot packs for back pain or discomfort    Back, belly, or thigh massages    Standing, rocking, walking, leaning over bed or tables, side-lying and sleeping    Miscellaneous:     Contractions are timed from the beginning of one to the beginning of the next    Try hard to sleep during the early stage of labor when you are not that uncomfortable. Timing of contractions at this point is not important    Even if you cannot sleep, resting in bed or on the couch can help you maintain your energy for labor    When you arrive at the hospital the nurse will check your baby's heartbeat, check your cervix, and will call us. The midwife on call will come in and be with you when you are in active labor    After hours you need to enter the hospital through the emergency room

## 2019-04-12 ENCOUNTER — MYC MEDICAL ADVICE (OUTPATIENT)
Dept: FAMILY MEDICINE | Facility: CLINIC | Age: 29
End: 2019-04-12

## 2019-04-12 ENCOUNTER — MYC MEDICAL ADVICE (OUTPATIENT)
Dept: ENDOCRINOLOGY | Facility: CLINIC | Age: 29
End: 2019-04-12

## 2019-04-12 DIAGNOSIS — D22.9 NUMEROUS MOLES: Primary | ICD-10-CM

## 2019-04-12 DIAGNOSIS — Z80.8 FAMILY HISTORY OF MELANOMA: ICD-10-CM

## 2019-04-12 NOTE — TELEPHONE ENCOUNTER
Please see my chart message below     Please review and advise     Thank you     Farida Rivas RN, BSN  [facility]

## 2019-04-15 DIAGNOSIS — E03.9 HYPOTHYROIDISM AFFECTING PREGNANCY IN FIRST TRIMESTER: ICD-10-CM

## 2019-04-15 DIAGNOSIS — O99.281 HYPOTHYROIDISM AFFECTING PREGNANCY IN FIRST TRIMESTER: ICD-10-CM

## 2019-04-15 LAB
T4 FREE SERPL-MCNC: 1.19 NG/DL (ref 0.76–1.46)
TSH SERPL DL<=0.005 MIU/L-ACNC: 1.49 MU/L (ref 0.4–4)

## 2019-04-15 PROCEDURE — 36415 COLL VENOUS BLD VENIPUNCTURE: CPT | Performed by: CLINICAL NURSE SPECIALIST

## 2019-04-15 PROCEDURE — 84439 ASSAY OF FREE THYROXINE: CPT | Performed by: CLINICAL NURSE SPECIALIST

## 2019-04-15 PROCEDURE — 84443 ASSAY THYROID STIM HORMONE: CPT | Performed by: CLINICAL NURSE SPECIALIST

## 2019-04-17 ENCOUNTER — PRENATAL OFFICE VISIT (OUTPATIENT)
Dept: OBGYN | Facility: CLINIC | Age: 29
End: 2019-04-17
Payer: COMMERCIAL

## 2019-04-17 VITALS
DIASTOLIC BLOOD PRESSURE: 64 MMHG | WEIGHT: 144 LBS | HEIGHT: 61 IN | SYSTOLIC BLOOD PRESSURE: 100 MMHG | BODY MASS INDEX: 27.19 KG/M2

## 2019-04-17 DIAGNOSIS — Z34.03 ENCOUNTER FOR SUPERVISION OF NORMAL FIRST PREGNANCY IN THIRD TRIMESTER: Primary | ICD-10-CM

## 2019-04-17 PROCEDURE — 99207 ZZC PRENATAL VISIT: CPT | Performed by: ADVANCED PRACTICE MIDWIFE

## 2019-04-17 ASSESSMENT — MIFFLIN-ST. JEOR: SCORE: 1330.56

## 2019-04-17 NOTE — PATIENT INSTRUCTIONS
When to call   Discuss this with your nurse-midwife.  In general, you should call if you have leaking fluid, bleeding, decreased baby movement or if your contractions are about five minutes apart.  The midwife pager phone number is [phone]. Please call this number.  When you call, you will be prompted to enter the call back number you would like the on call provider to call back.  Please enter a 10 digit phone number. You will hear a few beeps after entering the phone number.  The on-call provider will call you back, perhaps from a blocked phone number. If you do not hear from the on call midwife within 20 minutes, please call our main clinic phone [phone].    The hospital     Your baby will be born at the [facility], which is on the fourth floor of [facility].  Call [phone]) to arrange a tour of the [facility].     Packing your bag    Bring clothing for you and your baby, and anything that may make your birth/stay more comfortable.  It is a good idea to practice installing the car seat before you deliver.  Please do not bring valuables to the hospital.

## 2019-04-17 NOTE — NURSING NOTE
"Chief Complaint   Patient presents with     Prenatal Care     39w1d       Initial /64   Ht 1.549 m (5' 1\")   Wt 65.3 kg (144 lb)   LMP 2018   BMI 27.21 kg/m   Estimated body mass index is 27.21 kg/m  as calculated from the following:    Height as of this encounter: 1.549 m (5' 1\").    Weight as of this encounter: 65.3 kg (144 lb).  BP completed using cuff size: regular    Questioned patient about current smoking habits.  Pt. has never smoked.          Hilario Goins MA               "

## 2019-04-17 NOTE — PROGRESS NOTES
"S: Feels ready,  Baby active.  Denies uterine cramping, vaginal bleeding or leaking of fluid. No headache, increase in edema, no epigastric pain. Occasional yuniel ken contractions.   O: Vitals: LMP 07/17/2018  /64   Ht 1.549 m (5' 1\")   Wt 65.3 kg (144 lb)   LMP 07/17/2018   BMI 27.21 kg/m      BMI= There is no height or weight on file to calculate BMI.  Exam:  Constitutional: healthy, alert and no distress  Respiratory: respirations even and unlabored  Gastrointestinal: Abdomen soft, non-tender. Fundus measures appropriate for gestational age. Fetal heart tones 140 heard without difficulty and within normal limits, Cephalic per leopold's maneuver.   : Deferred  Psychiatric: mentation appears normal and affect normal/bright  A: (Z34.03) Encounter for supervision of normal first pregnancy in third trimester  (primary encounter diagnosis)  Comment: WNL  Plan: return to clinic 1 week     P: Labor signs and symptoms discussed, aware of numbers to call.  Discussed warning signs of PIH/preeclampsia and patient will monitor.  GBS screen completed. Discussed plans for labor.   Reviewed counting fetal movement.  Discussed postdates options.  Encouraged patient to call with any questions or concerns.  Return to clinic 1 weeks    Yakelin Mendez, SUHA, APRN, CNM          "

## 2019-04-24 ENCOUNTER — PRENATAL OFFICE VISIT (OUTPATIENT)
Dept: OBGYN | Facility: CLINIC | Age: 29
End: 2019-04-24
Payer: COMMERCIAL

## 2019-04-24 VITALS — BODY MASS INDEX: 27.47 KG/M2 | WEIGHT: 145.4 LBS | DIASTOLIC BLOOD PRESSURE: 64 MMHG | SYSTOLIC BLOOD PRESSURE: 112 MMHG

## 2019-04-24 DIAGNOSIS — Z34.03 ENCOUNTER FOR SUPERVISION OF NORMAL FIRST PREGNANCY IN THIRD TRIMESTER: Primary | ICD-10-CM

## 2019-04-24 PROCEDURE — 99207 ZZC PRENATAL VISIT: CPT | Performed by: ADVANCED PRACTICE MIDWIFE

## 2019-04-24 NOTE — RESULT ENCOUNTER NOTE
Yary,  Your thyroid levels are in range.  I'll see you after the baby is born - about 6-8 weeks after delivery if possible.  Azalea Burk NP  Endocrinology

## 2019-04-24 NOTE — PROGRESS NOTES
S: Feels well,  Baby active.  Denies uterine cramping, vaginal bleeding or leaking of fluid. No headache, increase in edema, no epigastric pain. Discussed IOL, patient desires to wait.  O: Vitals: /64 (BP Location: Left arm, Patient Position: Chair, Cuff Size: Adult Regular)   Wt 66 kg (145 lb 6.4 oz)   LMP 07/17/2018   BMI 27.47 kg/m    BMI= Body mass index is 27.47 kg/m .  Exam:  Constitutional: healthy, alert and no distress  Respiratory: respirations even and unlabored  Gastrointestinal: Abdomen soft, non-tender. Fundus measures appropriate for gestational age. Fetal heart tones hear without difficulty and within normal limits  : Normal external genitalia without lesions  Psychiatric: mentation appears normal and affect normal/bright  A:     ICD-10-CM    1. Encounter for supervision of normal first pregnancy in third trimester Z34.03      P: Labor signs and symptoms discussed, aware of numbers to call.  Discussed warning signs of PIH/preeclampsia and patient will monitor.  GBS screen completed, negative.   Discussed plans for labor.   Discussed postdates options and need for bi-weekly BPPs to start at 41 weeks.  Encouraged patient to call with any questions or concerns.  Return to clinic 1 week for BPP and clinic visit.    Na Hua CNM

## 2019-04-24 NOTE — NURSING NOTE
"Chief Complaint   Patient presents with     Prenatal Care     40 weeks 1 day, no c/o vaginal bleeding, leaking of fluids. +Contractions, +FM daily     Induction Of Labor     discuss scheduling       Initial /64 (BP Location: Left arm, Patient Position: Chair, Cuff Size: Adult Regular)   Wt 66 kg (145 lb 6.4 oz)   LMP 2018   BMI 27.47 kg/m   Estimated body mass index is 27.47 kg/m  as calculated from the following:    Height as of 19: 1.549 m (5' 1\").    Weight as of this encounter: 66 kg (145 lb 6.4 oz).  BP completed using cuff size: regular    Questioned patient about current smoking habits.  Pt. has never smoked.          The following HM Due: NONE  Eugenia Aburto CMA               "

## 2019-04-30 ENCOUNTER — PRENATAL OFFICE VISIT (OUTPATIENT)
Dept: OBGYN | Facility: CLINIC | Age: 29
End: 2019-04-30
Payer: COMMERCIAL

## 2019-04-30 VITALS — BODY MASS INDEX: 28.15 KG/M2 | WEIGHT: 149 LBS | DIASTOLIC BLOOD PRESSURE: 70 MMHG | SYSTOLIC BLOOD PRESSURE: 108 MMHG

## 2019-04-30 DIAGNOSIS — Z34.03 ENCOUNTER FOR SUPERVISION OF NORMAL FIRST PREGNANCY IN THIRD TRIMESTER: ICD-10-CM

## 2019-04-30 DIAGNOSIS — O48.0 POST-TERM PREGNANCY, 40-42 WEEKS OF GESTATION: ICD-10-CM

## 2019-04-30 DIAGNOSIS — Z34.03 ENCOUNTER FOR SUPERVISION OF NORMAL FIRST PREGNANCY IN THIRD TRIMESTER: Primary | ICD-10-CM

## 2019-04-30 PROCEDURE — 59426 ANTEPARTUM CARE ONLY: CPT | Performed by: ADVANCED PRACTICE MIDWIFE

## 2019-04-30 PROCEDURE — 59025 FETAL NON-STRESS TEST: CPT | Performed by: ADVANCED PRACTICE MIDWIFE

## 2019-04-30 PROCEDURE — 99207 ZZC PRENATAL VISIT: CPT | Performed by: ADVANCED PRACTICE MIDWIFE

## 2019-04-30 NOTE — NURSING NOTE
"Chief Complaint   Patient presents with     Prenatal Care     41w, NST       Initial /70 (BP Location: Right arm, Cuff Size: Adult Regular)   Wt 67.6 kg (149 lb)   LMP 2018   BMI 28.15 kg/m   Estimated body mass index is 28.15 kg/m  as calculated from the following:    Height as of 19: 1.549 m (5' 1\").    Weight as of this encounter: 67.6 kg (149 lb).  BP completed using cuff size: regular    Questioned patient about current smoking habits.  Pt. has never smoked.          The following HM Due: NONE  Lynette Corley CMA      "

## 2019-04-30 NOTE — PROGRESS NOTES
S: Feels ready to have this baby,  Baby active.  Denies uterine cramping, vaginal bleeding or leaking of fluid. No headache, increase in edema, no epigastric pain.   O: Vitals: /70 (BP Location: Right arm, Cuff Size: Adult Regular)   Wt 67.6 kg (149 lb)   LMP 07/17/2018   BMI 28.15 kg/m    BMI= Body mass index is 28.15 kg/m .  Exam:  Constitutional: healthy, alert and no distress  Respiratory: respirations even and unlabored  Gastrointestinal: Abdomen soft, non-tender. Fundus measures appropriate for gestational age. Fetal heart tones hear without difficulty and within normal limits  : Normal external genitalia without lesions  Psychiatric: mentation appears normal and affect normal/bright  A:     ICD-10-CM    1. Encounter for supervision of normal first pregnancy in third trimester Z34.03    2. Post-term pregnancy, 40-42 weeks of gestation O48.0 FETAL NON-STRESS TEST     P: Labor signs and symptoms discussed, aware of numbers to call.  Discussed warning signs of PIH/preeclampsia and patient will monitor.  GBS screen completed. Discussed plans for labor.   Discussed postdates options and need for bi-weekly BPPs to start at 41 weeks. BPP today 8/10  Fetal Non-Stress Test       Fetal heart tones:   Baseline 140  Variability: moderate  Accelerations: Present  Decelerations:  None  Category 1        fetal heart rate tracing    NST Interpretation: reactive           Encouraged patient to call with any questions or concerns.  Return to clinic 3-4 days    CHALINO Kuo, JERICA

## 2019-05-03 ENCOUNTER — PRENATAL OFFICE VISIT (OUTPATIENT)
Dept: OBGYN | Facility: CLINIC | Age: 29
End: 2019-05-03
Payer: COMMERCIAL

## 2019-05-03 VITALS — WEIGHT: 148.6 LBS | BODY MASS INDEX: 28.08 KG/M2 | DIASTOLIC BLOOD PRESSURE: 62 MMHG | SYSTOLIC BLOOD PRESSURE: 102 MMHG

## 2019-05-03 DIAGNOSIS — Z34.03 ENCOUNTER FOR SUPERVISION OF NORMAL FIRST PREGNANCY IN THIRD TRIMESTER: Primary | ICD-10-CM

## 2019-05-03 PROCEDURE — 99207 ZZC PRENATAL VISIT: CPT | Performed by: ADVANCED PRACTICE MIDWIFE

## 2019-05-03 NOTE — PROGRESS NOTES
S: Feels good, very ready to have the baby, but still hoping to wait for spontaneous labor.  Baby active.  Reports BH contractions. Denies vaginal bleeding or leaking of fluid. No headache, increase in edema, no epigastric pain.   O: Vitals: /62   Wt 67.4 kg (148 lb 9.6 oz)   LMP 07/17/2018   BMI 28.08 kg/m    BMI= Body mass index is 28.08 kg/m .  Exam:  Constitutional: healthy, alert and no distress  Respiratory: respirations even and unlabored  Gastrointestinal: Abdomen soft, non-tender. Fundus measures appropriate for gestational age. Fetal heart tones hear without difficulty and within normal limits  : Normal external genitalia without lesions. 2/70/-2, Bag of burrows palpated.  Psychiatric: mentation appears normal and affect normal/bright  A:     ICD-10-CM    1. Encounter for supervision of normal first pregnancy in third trimester Z34.03      P: Discussed warning signs of PIH/preeclampsia and patient will monitor.  GBS screen completed. Discussed plans for labor.   Discussed postdates options.  Membranes swept.  Encouraged patient to call with any questions or concerns.      BPP:  8/8,  LUKE:  11  Warning signs reviewed   Labor signs and symptoms discussed, aware of numbers to call  Induction of labor discussed and recommended by 42 weeks  Induction of labor scheduled:  Yes  Date/Time for induction of labor:  5/6 @ 1700    Nunez Score:  Dilation of Cervix:  1-2     Score = 1  Effacement:  60-70%;   Score = 2  Consistency:  Soft;   Score = 2  Position:  Mid;   Score = 1  Station:  -2;   Score = 1  Total Nunez Score:  7    Cervical ripening needed:  Yes  Discussed possible methods cervical ripening, risks/benefits and expectations.  Discussed possible change of pitocin augmentation if cervix is more dilated upon admission for induction.     Vivian Wetzel CNM on 5/3/2019 at 3:47 PM

## 2019-05-03 NOTE — NURSING NOTE
"Chief Complaint   Patient presents with     Prenatal Care   41w3d  BPP done today  Here today with spouse    Initial /62   Wt 67.4 kg (148 lb 9.6 oz)   LMP 2018   BMI 28.08 kg/m   Estimated body mass index is 28.08 kg/m  as calculated from the following:    Height as of 19: 1.549 m (5' 1\").    Weight as of this encounter: 67.4 kg (148 lb 9.6 oz).  BP completed using cuff size: regular    Questioned patient about current smoking habits.  Pt. has never smoked.          The following HM Due: NONE    Janny Braden CMA               "

## 2019-05-04 ENCOUNTER — TELEPHONE (OUTPATIENT)
Dept: OBGYN | Facility: CLINIC | Age: 29
End: 2019-05-04

## 2019-05-04 NOTE — TELEPHONE ENCOUNTER
Yary paged CNM on call to report irregular contractions and change in fetal movements. Patient reports she was having irregular contractions this morning, and then increased to every 6-7 minutes from [id number]. At 1800 contractions stopped completely. She was feeling lots of fetal movement throughout the day but has felt less movements  in the past ~45 minutes. She has not been counting movements. Instructed patient to drink cold juice, lay down, rest, and count movements for 1 hour. If less than 10 in the next hour, call CNM and plan to go to hospital.   Patient denies leaking of fluid and bleeding. No longer having contractions.   Estelita Knowles, JERICA, WHNP-BC

## 2019-05-05 ENCOUNTER — HOSPITAL ENCOUNTER (INPATIENT)
Facility: CLINIC | Age: 29
LOS: 3 days | Discharge: HOME HEALTH | End: 2019-05-08
Attending: ADVANCED PRACTICE MIDWIFE | Admitting: OBSTETRICS & GYNECOLOGY
Payer: COMMERCIAL

## 2019-05-05 ENCOUNTER — ANESTHESIA EVENT (OUTPATIENT)
Dept: OBGYN | Facility: CLINIC | Age: 29
End: 2019-05-05
Payer: COMMERCIAL

## 2019-05-05 ENCOUNTER — ANESTHESIA (OUTPATIENT)
Dept: OBGYN | Facility: CLINIC | Age: 29
End: 2019-05-05
Payer: COMMERCIAL

## 2019-05-05 ENCOUNTER — TELEPHONE (OUTPATIENT)
Dept: OBGYN | Facility: CLINIC | Age: 29
End: 2019-05-05

## 2019-05-05 PROBLEM — Z36.89 ENCOUNTER FOR TRIAGE IN PREGNANT PATIENT: Status: ACTIVE | Noted: 2019-05-05

## 2019-05-05 PROBLEM — Z37.9 NORMAL LABOR: Status: ACTIVE | Noted: 2019-05-05

## 2019-05-05 LAB
HGB BLD-MCNC: 13.6 G/DL (ref 11.7–15.7)
RUPTURE OF FETAL MEMBRANES BY ROM PLUS: POSITIVE
T PALLIDUM AB SER QL: NONREACTIVE

## 2019-05-05 PROCEDURE — 00HU33Z INSERTION OF INFUSION DEVICE INTO SPINAL CANAL, PERCUTANEOUS APPROACH: ICD-10-PCS | Performed by: ANESTHESIOLOGY

## 2019-05-05 PROCEDURE — 25000128 H RX IP 250 OP 636: Performed by: ANESTHESIOLOGY

## 2019-05-05 PROCEDURE — 86901 BLOOD TYPING SEROLOGIC RH(D): CPT | Performed by: ADVANCED PRACTICE MIDWIFE

## 2019-05-05 PROCEDURE — 84112 EVAL AMNIOTIC FLUID PROTEIN: CPT | Performed by: ADVANCED PRACTICE MIDWIFE

## 2019-05-05 PROCEDURE — 25000128 H RX IP 250 OP 636: Performed by: ADVANCED PRACTICE MIDWIFE

## 2019-05-05 PROCEDURE — 25000132 ZZH RX MED GY IP 250 OP 250 PS 637: Performed by: ADVANCED PRACTICE MIDWIFE

## 2019-05-05 PROCEDURE — 86900 BLOOD TYPING SEROLOGIC ABO: CPT | Performed by: ADVANCED PRACTICE MIDWIFE

## 2019-05-05 PROCEDURE — 86901 BLOOD TYPING SEROLOGIC RH(D): CPT | Performed by: OBSTETRICS & GYNECOLOGY

## 2019-05-05 PROCEDURE — 37000011 ZZH ANESTHESIA WARD SERVICE

## 2019-05-05 PROCEDURE — 25800030 ZZH RX IP 258 OP 636: Performed by: ADVANCED PRACTICE MIDWIFE

## 2019-05-05 PROCEDURE — 27110038 ZZH RX 271: Performed by: ANESTHESIOLOGY

## 2019-05-05 PROCEDURE — 86780 TREPONEMA PALLIDUM: CPT | Performed by: ADVANCED PRACTICE MIDWIFE

## 2019-05-05 PROCEDURE — 12000000 ZZH R&B MED SURG/OB

## 2019-05-05 PROCEDURE — 25000128 H RX IP 250 OP 636

## 2019-05-05 PROCEDURE — 86850 RBC ANTIBODY SCREEN: CPT | Performed by: OBSTETRICS & GYNECOLOGY

## 2019-05-05 PROCEDURE — 86900 BLOOD TYPING SEROLOGIC ABO: CPT | Performed by: OBSTETRICS & GYNECOLOGY

## 2019-05-05 PROCEDURE — 40000671 ZZH STATISTIC ANESTHESIA CASE

## 2019-05-05 PROCEDURE — 25800030 ZZH RX IP 258 OP 636: Performed by: ANESTHESIOLOGY

## 2019-05-05 PROCEDURE — 3E0R3BZ INTRODUCTION OF ANESTHETIC AGENT INTO SPINAL CANAL, PERCUTANEOUS APPROACH: ICD-10-PCS | Performed by: ANESTHESIOLOGY

## 2019-05-05 PROCEDURE — 85018 HEMOGLOBIN: CPT | Performed by: ADVANCED PRACTICE MIDWIFE

## 2019-05-05 PROCEDURE — 25000125 ZZHC RX 250: Performed by: ADVANCED PRACTICE MIDWIFE

## 2019-05-05 PROCEDURE — G0463 HOSPITAL OUTPT CLINIC VISIT: HCPCS

## 2019-05-05 RX ORDER — ACETAMINOPHEN 325 MG/1
650 TABLET ORAL EVERY 4 HOURS PRN
Status: DISCONTINUED | OUTPATIENT
Start: 2019-05-05 | End: 2019-05-06 | Stop reason: CLARIF

## 2019-05-05 RX ORDER — SODIUM CHLORIDE, SODIUM LACTATE, POTASSIUM CHLORIDE, CALCIUM CHLORIDE 600; 310; 30; 20 MG/100ML; MG/100ML; MG/100ML; MG/100ML
INJECTION, SOLUTION INTRAVENOUS CONTINUOUS
Status: DISCONTINUED | OUTPATIENT
Start: 2019-05-05 | End: 2019-05-06 | Stop reason: CLARIF

## 2019-05-05 RX ORDER — METHYLERGONOVINE MALEATE 0.2 MG/ML
200 INJECTION INTRAVENOUS
Status: DISCONTINUED | OUTPATIENT
Start: 2019-05-05 | End: 2019-05-06 | Stop reason: CLARIF

## 2019-05-05 RX ORDER — IBUPROFEN 800 MG/1
800 TABLET, FILM COATED ORAL
Status: DISCONTINUED | OUTPATIENT
Start: 2019-05-05 | End: 2019-05-06 | Stop reason: ALTCHOICE

## 2019-05-05 RX ORDER — MISOPROSTOL 100 UG/1
25 TABLET ORAL
Status: DISCONTINUED | OUTPATIENT
Start: 2019-05-05 | End: 2019-05-06 | Stop reason: CLARIF

## 2019-05-05 RX ORDER — DEXTROSE, SODIUM CHLORIDE, SODIUM LACTATE, POTASSIUM CHLORIDE, AND CALCIUM CHLORIDE 5; .6; .31; .03; .02 G/100ML; G/100ML; G/100ML; G/100ML; G/100ML
500 INJECTION, SOLUTION INTRAVENOUS ONCE
Status: COMPLETED | OUTPATIENT
Start: 2019-05-05 | End: 2019-05-05

## 2019-05-05 RX ORDER — NALBUPHINE HYDROCHLORIDE 10 MG/ML
2.5-5 INJECTION, SOLUTION INTRAMUSCULAR; INTRAVENOUS; SUBCUTANEOUS EVERY 6 HOURS PRN
Status: DISCONTINUED | OUTPATIENT
Start: 2019-05-05 | End: 2019-05-06 | Stop reason: CLARIF

## 2019-05-05 RX ORDER — NALBUPHINE HYDROCHLORIDE 10 MG/ML
10 INJECTION, SOLUTION INTRAMUSCULAR; INTRAVENOUS; SUBCUTANEOUS ONCE
Status: COMPLETED | OUTPATIENT
Start: 2019-05-05 | End: 2019-05-05

## 2019-05-05 RX ORDER — BUPIVACAINE HCL/0.9 % NACL/PF 0.125 %
PLASTIC BAG, INJECTION (ML) EPIDURAL CONTINUOUS
Status: DISCONTINUED | OUTPATIENT
Start: 2019-05-05 | End: 2019-05-06 | Stop reason: CLARIF

## 2019-05-05 RX ORDER — ONDANSETRON 2 MG/ML
4 INJECTION INTRAMUSCULAR; INTRAVENOUS EVERY 6 HOURS PRN
Status: DISCONTINUED | OUTPATIENT
Start: 2019-05-05 | End: 2019-05-06 | Stop reason: CLARIF

## 2019-05-05 RX ORDER — PROMETHAZINE HYDROCHLORIDE 25 MG/ML
25 INJECTION INTRAMUSCULAR; INTRAVENOUS EVERY 6 HOURS PRN
Status: DISCONTINUED | OUTPATIENT
Start: 2019-05-05 | End: 2019-05-08 | Stop reason: HOSPADM

## 2019-05-05 RX ORDER — NALOXONE HYDROCHLORIDE 0.4 MG/ML
.1-.4 INJECTION, SOLUTION INTRAMUSCULAR; INTRAVENOUS; SUBCUTANEOUS
Status: DISCONTINUED | OUTPATIENT
Start: 2019-05-05 | End: 2019-05-06 | Stop reason: CLARIF

## 2019-05-05 RX ORDER — LIDOCAINE 40 MG/G
CREAM TOPICAL
Status: DISCONTINUED | OUTPATIENT
Start: 2019-05-05 | End: 2019-05-06 | Stop reason: CLARIF

## 2019-05-05 RX ORDER — OXYTOCIN/0.9 % SODIUM CHLORIDE 30/500 ML
100-340 PLASTIC BAG, INJECTION (ML) INTRAVENOUS CONTINUOUS PRN
Status: COMPLETED | OUTPATIENT
Start: 2019-05-05 | End: 2019-05-06

## 2019-05-05 RX ORDER — CARBOPROST TROMETHAMINE 250 UG/ML
250 INJECTION, SOLUTION INTRAMUSCULAR
Status: DISCONTINUED | OUTPATIENT
Start: 2019-05-05 | End: 2019-05-06 | Stop reason: CLARIF

## 2019-05-05 RX ORDER — OXYTOCIN 10 [USP'U]/ML
10 INJECTION, SOLUTION INTRAMUSCULAR; INTRAVENOUS
Status: DISCONTINUED | OUTPATIENT
Start: 2019-05-05 | End: 2019-05-06 | Stop reason: CLARIF

## 2019-05-05 RX ORDER — OXYTOCIN/0.9 % SODIUM CHLORIDE 30/500 ML
1-24 PLASTIC BAG, INJECTION (ML) INTRAVENOUS CONTINUOUS
Status: DISCONTINUED | OUTPATIENT
Start: 2019-05-05 | End: 2019-05-06 | Stop reason: CLARIF

## 2019-05-05 RX ORDER — NALBUPHINE HYDROCHLORIDE 10 MG/ML
INJECTION, SOLUTION INTRAMUSCULAR; INTRAVENOUS; SUBCUTANEOUS
Status: COMPLETED
Start: 2019-05-05 | End: 2019-05-05

## 2019-05-05 RX ORDER — EPHEDRINE SULFATE 50 MG/ML
5 INJECTION, SOLUTION INTRAMUSCULAR; INTRAVENOUS; SUBCUTANEOUS
Status: DISCONTINUED | OUTPATIENT
Start: 2019-05-05 | End: 2019-05-06 | Stop reason: CLARIF

## 2019-05-05 RX ORDER — OXYCODONE AND ACETAMINOPHEN 5; 325 MG/1; MG/1
1 TABLET ORAL
Status: DISCONTINUED | OUTPATIENT
Start: 2019-05-05 | End: 2019-05-06 | Stop reason: CLARIF

## 2019-05-05 RX ADMIN — Medication: at 15:44

## 2019-05-05 RX ADMIN — SODIUM CHLORIDE, POTASSIUM CHLORIDE, SODIUM LACTATE AND CALCIUM CHLORIDE: 600; 310; 30; 20 INJECTION, SOLUTION INTRAVENOUS at 15:44

## 2019-05-05 RX ADMIN — Medication 25 MCG: at 07:00

## 2019-05-05 RX ADMIN — NALBUPHINE HYDROCHLORIDE 10 MG: 10 INJECTION, SOLUTION INTRAMUSCULAR; INTRAVENOUS; SUBCUTANEOUS at 10:26

## 2019-05-05 RX ADMIN — SODIUM CHLORIDE, POTASSIUM CHLORIDE, SODIUM LACTATE AND CALCIUM CHLORIDE 500 ML: 600; 310; 30; 20 INJECTION, SOLUTION INTRAVENOUS at 09:47

## 2019-05-05 RX ADMIN — NALBUPHINE HYDROCHLORIDE 10 MG: 10 INJECTION, SOLUTION INTRAMUSCULAR; INTRAVENOUS; SUBCUTANEOUS at 10:06

## 2019-05-05 RX ADMIN — OXYTOCIN-SODIUM CHLORIDE 0.9% IV SOLN 30 UNIT/500ML 2 MILLI-UNITS/MIN: 30-0.9/5 SOLUTION at 18:41

## 2019-05-05 RX ADMIN — PROMETHAZINE HYDROCHLORIDE 25 MG: 25 INJECTION INTRAMUSCULAR; INTRAVENOUS at 10:27

## 2019-05-05 RX ADMIN — SODIUM CHLORIDE, SODIUM LACTATE, POTASSIUM CHLORIDE, CALCIUM CHLORIDE AND DEXTROSE MONOHYDRATE 500 ML: 5; 600; 310; 30; 20 INJECTION, SOLUTION INTRAVENOUS at 20:51

## 2019-05-05 RX ADMIN — NALBUPHINE HYDROCHLORIDE 10 MG: 10 INJECTION INTRAMUSCULAR; INTRAVENOUS; SUBCUTANEOUS at 10:06

## 2019-05-05 ASSESSMENT — ACTIVITIES OF DAILY LIVING (ADL)
BATHING: 0-->INDEPENDENT
AMBULATION: 0-->INDEPENDENT
RETIRED_COMMUNICATION: 0-->UNDERSTANDS/COMMUNICATES WITHOUT DIFFICULTY
COGNITION: 0 - NO COGNITION ISSUES REPORTED
RETIRED_EATING: 0-->INDEPENDENT
TRANSFERRING: 0-->INDEPENDENT
TOILETING: 0-->INDEPENDENT
FALL_HISTORY_WITHIN_LAST_SIX_MONTHS: NO
DRESS: 0-->INDEPENDENT
SWALLOWING: 0-->SWALLOWS FOODS/LIQUIDS WITHOUT DIFFICULTY

## 2019-05-05 ASSESSMENT — MIFFLIN-ST. JEOR: SCORE: 1348.7

## 2019-05-05 NOTE — PROVIDER NOTIFICATION
05/05/19 0811   Provider Notification   Provider Name/Title Tala PIZANO   Method of Notification At Bedside   Reviewing plan of care

## 2019-05-05 NOTE — TELEPHONE ENCOUNTER
Spoke with patient to follow up on previous phone call. Patient reports she felt 10+ fetal movements within several minutes. Reports normal fetal movement now. Denies contractions. Encouraged to page CNM with changes or concerns. Reviewed signs of labor. Patient agreeable.   Estelita Knowles CNM, HAM-BC

## 2019-05-05 NOTE — PROGRESS NOTES
Data: [facility] presented to [facility]: 2019  3:38 AM.  Reason for maternal/fetal assessment is uterine contractions. Patient reports cx increased in intensity and frequency at 2245. Pt reports cx every 4 minutes. Pt appears stoic and states she is coping with cx.  Patient is a .  Prenatal record reviewed. Pregnancy  has been uncomplicated. Of note, pt has hx of hypothyroidism and pelvic fracture at age 15.  Gestational Age 41w5d. VSS. Fetal movement present. Patient denies leaking of vaginal fluid/rupture of membranes, nausea, vomiting, headache, visual disturbances, epigastric or URQ pain, significant edema. Support person is present.   Action: Verbal consent for EFM. Triage assessment completed. Bill of rights reviewed.  Response: Patient verbalized agreement with plan. Will contact Dr Estelita Knowles with update and further orders.

## 2019-05-05 NOTE — PROGRESS NOTES
"CNM PROGRESS NOTE    SUBJECTIVE:  Called to room by RN, reports that there was a small amount of clear fluid on the bed at about 0445. RN unsure if it was leaking of fluid and instructed patient to continue to monitor. Yary reports she feels like she is still having some wetness. ROM+ collected by RN, it was positive.     I discussed leaking of fluid with patient again who now reports it may have started Friday at about 2000 but she is unsure. States she had to change her underwear once yesterday because they were slightly damp but was also having increased discharge since Friday. Never had a gush of fluid.     Patient reports contractions are staying the same intensity since arriving to the hospital.     OBJECTIVE:  /79   Temp 98.5  F (36.9  C) (Oral)   Resp 16   Ht 1.549 m (5' 1\")   Wt 67.1 kg (148 lb)   LMP 07/17/2018   BMI 27.96 kg/m    Vitals WNL, afebrile    Fetal heart tones: Baseline 130s   Variability: mod  Accelerations: present  Decelerations: absent    Contractions: Pt is tiffanie every 2-5 minutes, lasting 40-60 seconds and palpates moderate    Cervix: deferred. Nunez's score 9 at 0440.   ROM: scant clear fluid    Pitocin- none  Antibiotics- none  Cervical ripening: N/A    ROM Plus: Positive    ASSESSMENT:  IUP @ 41w5d PROM and early labor   GBS- negative     PLAN:   Begin cervical ripening with Cytotec oral x 2 doses  Defer cervical exams unless significant change in pain/contraction pattern/FHT tracing  Encouraged upright positioning. Did sidelying release on each side x 10 minutes. Encouraged patient to sit on ball/walk.   Continue to monitor FHTs and temperature closely related to PROM  reevaluate in 2-4 hours/PRN    Estelita Knowles CNM      "

## 2019-05-05 NOTE — PROVIDER NOTIFICATION
05/05/19 0931   Provider Notification   Provider Name/Title Tala JERICA   Method of Notification Phone   Alma Delia monitor adjusted, abdomen palpated, does soften between contractions. Patient reporting constant contraction.    CNM coming to check cervix

## 2019-05-05 NOTE — PROGRESS NOTES
"CNM PROGRESS NOTE    SUBJECTIVE:  Into room to check on patient. Comfortable with epidural. States was able to get some rest. Significant other at bedside and supportive.    OBJECTIVE:  BP 96/55   Temp 98.8  F (37.1  C) (Oral)   Resp 16   Ht 1.549 m (5' 1\")   Wt 67.1 kg (148 lb)   LMP 2018   BMI 27.96 kg/m      Fetal heart tones: Baseline 130 bpm  Variability: Moderate with period of minimal variability  Accelerations: absent  Decelerations: absent    Contractions: Pt is tiffanie every 4-5 minutes, lasting 60-90 seconds and palpates strong    Cervix: 5/ 100% / -2, Vtx  ROM: clear fluid    Pitocin- none  Antibiotics- none  Cervical ripening: misoprostol    ASSESSMENT:  IUP @ 41w5d minimal/no progress   GBS- negative  No apparent distress     PLAN:   Discussed R/B/A of pitocin augmentation and IUPC placement with patient and significant other.  Patient agreeable to IUPC and pitocin augmentation. Reevaluate in 2 hours/PRN.  Anticipate       RAFAEL SULLIVAN CNM    "

## 2019-05-05 NOTE — TELEPHONE ENCOUNTER
Yary calls to report contractions are now every 4 minutes lasting 45 seconds to 1 minute. States they have been increasing in intensity over the past hour. She is now breathing through them. No leaking of fluid or bleeding. Normal fetal movement. Recommended she come to hospital. Patient agrees. L&D notified. Plans to arrive in approximately 30 minutes.   Estelita Knowles CNM, HAM-BC

## 2019-05-05 NOTE — PROVIDER NOTIFICATION
05/05/19 0445   Provider Notification   Provider Name/Title JERICA Wills   Method of Notification At Bedside   MD at bedside. Orders received for intermittent monitoring. Regular diet. Update CNM if pt desires pain medication.

## 2019-05-05 NOTE — PROVIDER NOTIFICATION
"   05/05/19 0642   Provider Notification   Provider Name/Title JERICA العراقي   Method of Notification At Bedside   CNM called to bedside due to ROM plus came back positive. Pt reports feeling some wetness since Friday around 2000 but states \"it hasn't been much... I'm not sure if its been constant\". Small amount of fluid noted on bed at 0445- RN unsure at that time if it was r/t gel or LOF and requested pt to inform RN if fluid continued. Pt states she felt like maybe she was a little more wet but was uncertain. ROM + collected and was positive. Nunez score 9. VORB for cytotec PO x 2  "

## 2019-05-05 NOTE — PROGRESS NOTES
"CNM PROGRESS NOTE    SUBJECTIVE:  Into room to check on patient. Patient more uncomfortable and tearful. Significant other at bedside and supportive.    OBJECTIVE:  /58   Temp 98.2  F (36.8  C) (Oral)   Resp 18   Ht 1.549 m (5' 1\")   Wt 67.1 kg (148 lb)   LMP 2018   BMI 27.96 kg/m      Fetal heart tones: Baseline 135 bpm  Variability: moderate  Accelerations: present  Decelerations: absent    Contractions: Pt is tiffanie every 2 minutes, lasting 60-80 seconds and palpates strong; per Alma Delia Monitor. Patient reports UCs recurrent without much of a break.    Cervix: 3.5/ 90% / -1, Vtx  ROM: clear fluid    Pitocin- none  Antibiotics- none  Cervical ripening: misoprostol    ASSESSMENT:  IUP @ 41w5d early labor and minimal progress   GBS- negative     PLAN:   Discussed comfort measures with patient and spouse.  Pain medication Nubain/Phenergan now for theraputic rest.  IV fluid bolus given to help space UCs.  Anticipate   Reevaluate in 3-4 hours/PRN    RAFAEL SULLIVAN CNM    "

## 2019-05-05 NOTE — ANESTHESIA PREPROCEDURE EVALUATION
Anesthesia Pre-Procedure Evaluation    Patient: Yary Muñoz   MRN: [id number] : 1990          Preoperative Diagnosis: * No pre-op diagnosis entered *    * No procedures listed *    Past Medical History:   Diagnosis Date     Hypothyroid     irregular period, weight gain, family history     NO ACTIVE PROBLEMS      Past Surgical History:   Procedure Laterality Date     none       [facility]       Anesthesia Evaluation       history and physical reviewed .             ROS/MED HX    ENT/Pulmonary:  - neg pulmonary ROS     Neurologic:  - neg neurologic ROS     Cardiovascular:  - neg cardiovascular ROS       METS/Exercise Tolerance:     Hematologic:         Musculoskeletal:         GI/Hepatic:  - neg GI/hepatic ROS       Renal/Genitourinary:         Endo:         Psychiatric:         Infectious Disease:         Malignancy:         Other:                     neg OB ROS            Physical Exam  Normal systems: cardiovascular, pulmonary and dental    Airway   Mallampati: II    Dental     Cardiovascular       Pulmonary     Other findings: .Lab Test        19                       0450          1024          0947          WBC           --          9.1          6.5           HGB          13.6         11.8         13.8          MCV           --          93           87            PLT           --          240          247            No lab results found.        Lab Results   Component Value Date    WBC 9.1 2019    HGB 13.6 2019    HCT 35.4 2019     2019    TSH 1.49 04/15/2019    T4 1.19 04/15/2019       Preop Vitals  BP Readings from Last 3 Encounters:   19 106/61   19 102/62   19 108/70    Pulse Readings from Last 3 Encounters:   18 66   01/10/18 84   07/10/12 70      Resp Readings from Last 3 Encounters:   19 16   07/10/12 14   11 18    SpO2 Readings from Last 3 Encounters:   01/10/18 99%   07/10/12 98%  "  08/06/11 98%      Temp Readings from Last 1 Encounters:   05/05/19 98.8  F (37.1  C) (Oral)    Ht Readings from Last 1 Encounters:   05/05/19 1.549 m (5' 1\")      Wt Readings from Last 1 Encounters:   05/05/19 67.1 kg (148 lb)    Estimated body mass index is 27.96 kg/m  as calculated from the following:    Height as of this encounter: 1.549 m (5' 1\").    Weight as of this encounter: 67.1 kg (148 lb).       Anesthesia Plan  Procedure only, no anesthetic delivered    History & Physical Review      ASA Status:  2 .  OB Epidural Asa: 2       Plan for Epidural          Postoperative Care      Consents  Anesthetic plan, risks, benefits and alternatives discussed with:  Patient..                 Jorge Luis Christina DO                    .  "

## 2019-05-05 NOTE — PROGRESS NOTES
"CNM PROGRESS NOTE    SUBJECTIVE: Into room to check on patient. On birthing ball. States was able to get some rest and relief from pain medications. Significant other at bedside and supportive.    OBJECTIVE:  /76   Temp 98.8  F (37.1  C) (Oral)   Resp 18   Ht 1.549 m (5' 1\")   Wt 67.1 kg (148 lb)   LMP 2018   BMI 27.96 kg/m      Fetal heart tones: Baseline 130 bpm  Variability: moderate  Accelerations: present  Decelerations: absent    Contractions: Pt is tiffanie every 2-4 minutes, lasting 60-90 seconds     Cervix: 5/ 100% / -3, Vtx  ROM: clear fluid, with forebag    Pitocin- none  Antibiotics- none  Cervical ripening: misoprostol    ASSESSMENT:  IUP @ 41w5d early labor and good progress   GBS- negative  Discussed options for further pain management.      PLAN:   Comfort measures prn   Pain medication if patient desires  Anticipate     RAFEAL SULLIVAN CNM    "

## 2019-05-05 NOTE — PROVIDER NOTIFICATION
05/05/19 1500   Provider Notification   Provider Name/Title Tala CNNEGIN   Patient has decided to have an epidural placed.     CNM will come up to see patient after the epidural is placed and the patient is comfortable

## 2019-05-05 NOTE — PROVIDER NOTIFICATION
05/05/19 0902   Provider Notification   Provider Name/Title Tala PIZANO   Method of Notification Phone   Cytotec dose held. Due to what appears to be frequent contractions. Alma Delia monitor adjusted. Patient visibly more uncomfortable.

## 2019-05-05 NOTE — H&P
JERICA Labor Admission History & Physical    Yary Muñoz is a 28 year old  with an IUP at 41w5d  ; ,   Partner/support Person: carlos Hendrix  Language Barrier: English  Clinic: [facility]  Provider: Na Hua cnm    Yary Muñoz is admitted to the [facility] at [facility] on 2019 at 4:22 AM       History of present inllness/Chief Complaint:    Here with: spontaneous onset of labor  Patient reports contractions are Regular           Baby active Yes  Membranes are intact.  Bloody show No   Any changes with medical history since last prenatal visit No  Accepts syphilis screening.      Obstetrical history  Estimated Date of Delivery: 2019 determined by LMP & US  Patient's last menstrual period was 2018.   Dating U/S: 2018    Fetal anatomic survey: Normal  Placenta: posterior & mid    PRENATAL COURSE  Prenatal care began at 10 wks gestation for a total of 16 prenatal visits.  Total wt gain 30 lbs; Body mass index is 27.96 kg/m .  Prenatal Blood Pressure: WNL  Prenatal course was complicated by history of pelvic fracture at age 15. All records have been reviewed and fracture is well healed. No indication to not attempt vaginal delivery.   Tdap: 2019  Rhogam: No    Patient Active Problem List   Diagnosis     Hypothyroidism affecting pregnancy in first trimester     Surveillance of previously prescribed contraceptive pill     Encounter for supervision of normal first pregnancy     Encounter for triage in pregnant patient       HISTORY  Allergies   Allergen Reactions     No Known Drug Allergies      Past Medical History:   Diagnosis Date     Hypothyroid     irregular period, weight gain, family history     NO ACTIVE PROBLEMS      Past Surgical History:   Procedure Laterality Date     none       [facility] GUIDEWIRE       Family History   Problem Relation Age of Onset     Thyroid Disease Mother         hypothyroid     Thyroid Disease Father          "hypothyroid     Other Cancer Maternal Grandfather         lung     Other Cancer Paternal Grandfather         skin     Breast Cancer Other         great aunt     Breast Cancer Maternal Aunt 39        early 40\"s     Hypothyroidism Brother      Diabetes Maternal Grandmother 60        type 2     Cerebrovascular Disease Maternal Grandmother 70     Hypertension Maternal Grandmother      Thyroid Disease Maternal Grandmother      Lipids Maternal Grandmother      Diabetes Maternal Grandfather 60        type 2     Cerebrovascular Disease Maternal Grandfather 50     Hypertension Maternal Grandfather      Lipids Maternal Grandfather      Cancer Maternal Grandfather 69        had wagoners disease and  from lung cancer     Thyroid Disease Paternal Grandmother      Cancer Paternal Grandfather 51         from skin cancer     Social History     Tobacco Use     Smoking status: Never Smoker     Smokeless tobacco: Never Used   Substance Use Topics     Alcohol use: No     OB History    Para Term  AB Living   1 0 0 0 0 0   SAB TAB Ectopic Multiple Live Births   0 0 0 0 0      # Outcome Date GA Lbr Edilson/2nd Weight Sex Delivery Anes PTL Lv   1 Current                LABS:  Lab Results   Component Value Date    ABO A 2018    RH Pos 2018    AS Neg 2018    HGB 11.8 2019    HEPBANG Nonreactive 2018    CHPCRT Negative 10/22/2018    GCPCRT Negative 10/22/2018       GBS Status:   Lab Results   Component Value Date    GBS Negative 2019     Rubella: Immune    HIV: Non-Reactive   Platelets:  240  1hr GCT:  119    ROS   Pt is alert and oriented  Pt denies significant constitutional symptoms including fever and/or malaise.    Pt denies significant respiratory, cardiovacular, GI, or muscular/skeletal complaints.    Neuro: Denies HA and visual changes  Muscoloskeletal: Denies except for discomforts r/t pregnancy     PHYSICAL EXAM:  /76   Temp 97.8  F (36.6  C) (Oral)   Ht 1.549 m (5' 1\")  "  Wt 67.1 kg (148 lb)   LMP 2018   BMI 27.96 kg/m    General appearance:  healthy, alert and active   Heart: RRR  Lungs: CTA bilaterally, normal respiratory effort  Abdomen: gravid, single vertex fetus, non-tender, EFW 8 lbs.   Legs: reflexes 2+ bilaterally, no clonus, no edema     Contractions: Pt is tiffanie every 4 minutes, lasting 60-80 seconds and palpates moderate    Fetal heart tones: Baseline 130s   Variability: moderate   Accelerations: present  Decelerations: absent, per L&D RN apparent decel at 0354 was maternal heart rate due to position change    FHTs Category 1    Cervix: 3/ 80%/ Anterior/ soft/ -3, Vtx  Bloody show: no  Membranes:  intact    ASSESSMENT:  28 year old  with camejo IUP 41w5d in early labor  NST not done  GBS negative and membranes intact  Post-dates pregnancy    PLAN:  Discussed option of admission for labor versus walking for 2 hours and discharging home if no cervical change. Patient requested to think about it as she is hoping for little intervention if possible. After discussion and answering all questions, patient and  request admission at this time.   Routine CN care  Labs ordered: Hemoglobin and type and screen  Teaching done r/t comfort measures, pain management options, and labor processes  Encouraged walking, upright positioning  Admit - see IP orders    Estelita Knowles CNM

## 2019-05-05 NOTE — PROVIDER NOTIFICATION
05/05/19 0945   Provider Notification   Provider Name/Title Tlaa PIZANO   Method of Notification At Bedside       Order receivied for phenergan 25mg IM, Nubain 10mg IV, Nubain 10mg  IM

## 2019-05-05 NOTE — PROGRESS NOTES
"CNM PROGRESS NOTE    SUBJECTIVE:  Into room to check on patient. Patient on birthing ball. Reports regular UCs, states coping.  at bedside and supportive.    OBJECTIVE:  /58   Temp 98.2  F (36.8  C) (Oral)   Resp 18   Ht 1.549 m (5' 1\")   Wt 67.1 kg (148 lb)   LMP 2018   BMI 27.96 kg/m      Fetal heart tones: Baseline 135 bpm   Variability: moderate  Accelerations: present  Decelerations: absent    Contractions: Pt is tiffanie every 2-4 minutes, per patient report and palpates strong    Cervix: deferred  ROM: clear fluid    Pitocin- none  Antibiotics- none  Cervical ripening: misoprostol    ASSESSMENT:  IUP @ 41w5d early labor   GBS- negative  Coping, minimal distress     PLAN:   Cervical ripening with misoprostol; discussion with patient regarding pitocin augmentation after second dose of misoprostol.  Anticipate   Reevaluate in 2-4 hours/PRN    RAFAEL SULLIVAN CNM    "

## 2019-05-05 NOTE — PROVIDER NOTIFICATION
05/05/19 0400   Provider Notification   Provider Name/Title JERICA Wills   Method of Notification At Bedside   MD at bedside to discuss POC with pt. SVE performed. Bedside ultrasound confirms cephalic presentation. Pt will be admitted.

## 2019-05-05 NOTE — PROVIDER NOTIFICATION
05/05/19 1229   Provider Notification   Provider Name/Title Tala CLARANEGIN     JERICA at bedside for SVE and review plan of care. SVE 5/100/ bulging bag..    Plan is to continue with laboring without interventions at this time

## 2019-05-05 NOTE — PROGRESS NOTES
"CLARAM PROGRESS NOTE    SUBJECTIVE: Into room to check on patient. Resting comfortably with epidural.  at bedside.    OBJECTIVE:  /61   Temp 98.8  F (37.1  C) (Oral)   Resp 16   Ht 1.549 m (5' 1\")   Wt 67.1 kg (148 lb)   LMP 2018   BMI 27.96 kg/m      Fetal heart tones: Baseline 145 bpm  Variability: Moderate  Accelerations: present  Decelerations: present (1 variable)    Contractions: Pt is tiffanie every 2-3 minutes, lasting 60-90 seconds    Cervix: 5.5/ 100% / -1, Vtx  ROM: clear fluid; AROM of forebag, moderate amount of clear fluid; tolerated procedure well    Pitocin- none  Antibiotics- none  Cervical ripening: misoprostol    ASSESSMENT:  IUP @ 41w5d early labor and minimal progress   GBS- negative  No apparent distress     PLAN:   Discussed R/B/A of amniotomy of forebag. Patient desires AROM.   Comfort measures prn   Anticipate   Reevaluate in 2 hours/PRN    RAFAEL SULLIVAN CNM    "

## 2019-05-06 ENCOUNTER — ANESTHESIA (OUTPATIENT)
Dept: SURGERY | Facility: CLINIC | Age: 29
End: 2019-05-06
Payer: COMMERCIAL

## 2019-05-06 ENCOUNTER — ANESTHESIA EVENT (OUTPATIENT)
Dept: SURGERY | Facility: CLINIC | Age: 29
End: 2019-05-06
Payer: COMMERCIAL

## 2019-05-06 LAB
ABO + RH BLD: NORMAL
BLD GP AB SCN SERPL QL: NORMAL
BLOOD BANK CMNT PATIENT-IMP: NORMAL
SPECIMEN EXP DATE BLD: NORMAL
SPECIMEN EXP DATE BLD: NORMAL

## 2019-05-06 PROCEDURE — 25000128 H RX IP 250 OP 636: Performed by: ADVANCED PRACTICE MIDWIFE

## 2019-05-06 PROCEDURE — 25000128 H RX IP 250 OP 636: Performed by: OBSTETRICS & GYNECOLOGY

## 2019-05-06 PROCEDURE — 25000125 ZZHC RX 250: Performed by: ADVANCED PRACTICE MIDWIFE

## 2019-05-06 PROCEDURE — 37000009 ZZH ANESTHESIA TECHNICAL FEE, EACH ADDTL 15 MIN: Performed by: OBSTETRICS & GYNECOLOGY

## 2019-05-06 PROCEDURE — 88307 TISSUE EXAM BY PATHOLOGIST: CPT | Performed by: OBSTETRICS & GYNECOLOGY

## 2019-05-06 PROCEDURE — 25000125 ZZHC RX 250: Performed by: NURSE ANESTHETIST, CERTIFIED REGISTERED

## 2019-05-06 PROCEDURE — 25000132 ZZH RX MED GY IP 250 OP 250 PS 637: Performed by: OBSTETRICS & GYNECOLOGY

## 2019-05-06 PROCEDURE — 37000008 ZZH ANESTHESIA TECHNICAL FEE, 1ST 30 MIN: Performed by: OBSTETRICS & GYNECOLOGY

## 2019-05-06 PROCEDURE — 25000125 ZZHC RX 250: Performed by: OBSTETRICS & GYNECOLOGY

## 2019-05-06 PROCEDURE — 25800030 ZZH RX IP 258 OP 636: Performed by: OBSTETRICS & GYNECOLOGY

## 2019-05-06 PROCEDURE — 36000058 ZZH SURGERY LEVEL 3 EA 15 ADDTL MIN: Performed by: OBSTETRICS & GYNECOLOGY

## 2019-05-06 PROCEDURE — 36000056 ZZH SURGERY LEVEL 3 1ST 30 MIN: Performed by: OBSTETRICS & GYNECOLOGY

## 2019-05-06 PROCEDURE — 59515 CESAREAN DELIVERY: CPT | Performed by: OBSTETRICS & GYNECOLOGY

## 2019-05-06 PROCEDURE — 27210794 ZZH OR GENERAL SUPPLY STERILE: Performed by: OBSTETRICS & GYNECOLOGY

## 2019-05-06 PROCEDURE — 71000016 ZZH RECOVERY PHASE 1 LEVEL 3 FIRST HR: Performed by: OBSTETRICS & GYNECOLOGY

## 2019-05-06 PROCEDURE — 12000000 ZZH R&B MED SURG/OB

## 2019-05-06 PROCEDURE — 25800030 ZZH RX IP 258 OP 636: Performed by: ADVANCED PRACTICE MIDWIFE

## 2019-05-06 PROCEDURE — 25000128 H RX IP 250 OP 636: Performed by: NURSE ANESTHETIST, CERTIFIED REGISTERED

## 2019-05-06 RX ORDER — MEPERIDINE HYDROCHLORIDE 50 MG/ML
12.5 INJECTION INTRAMUSCULAR; INTRAVENOUS; SUBCUTANEOUS
Status: DISCONTINUED | OUTPATIENT
Start: 2019-05-06 | End: 2019-05-08 | Stop reason: HOSPADM

## 2019-05-06 RX ORDER — MAGNESIUM HYDROXIDE 1200 MG/15ML
LIQUID ORAL PRN
Status: DISCONTINUED | OUTPATIENT
Start: 2019-05-06 | End: 2019-05-08 | Stop reason: HOSPADM

## 2019-05-06 RX ORDER — OXYCODONE HYDROCHLORIDE 5 MG/1
5-10 TABLET ORAL
Status: DISCONTINUED | OUTPATIENT
Start: 2019-05-06 | End: 2019-05-08 | Stop reason: HOSPADM

## 2019-05-06 RX ORDER — CEFAZOLIN SODIUM 2 G/100ML
2 INJECTION, SOLUTION INTRAVENOUS
Status: COMPLETED | OUTPATIENT
Start: 2019-05-06 | End: 2019-05-06

## 2019-05-06 RX ORDER — OXYTOCIN 10 [USP'U]/ML
10 INJECTION, SOLUTION INTRAMUSCULAR; INTRAVENOUS
Status: DISCONTINUED | OUTPATIENT
Start: 2019-05-06 | End: 2019-05-08 | Stop reason: HOSPADM

## 2019-05-06 RX ORDER — HYDROMORPHONE HYDROCHLORIDE 1 MG/ML
.3-.5 INJECTION, SOLUTION INTRAMUSCULAR; INTRAVENOUS; SUBCUTANEOUS EVERY 30 MIN PRN
Status: DISCONTINUED | OUTPATIENT
Start: 2019-05-06 | End: 2019-05-08 | Stop reason: HOSPADM

## 2019-05-06 RX ORDER — BISACODYL 10 MG
10 SUPPOSITORY, RECTAL RECTAL DAILY PRN
Status: DISCONTINUED | OUTPATIENT
Start: 2019-05-08 | End: 2019-05-08 | Stop reason: HOSPADM

## 2019-05-06 RX ORDER — NALOXONE HYDROCHLORIDE 0.4 MG/ML
.1-.4 INJECTION, SOLUTION INTRAMUSCULAR; INTRAVENOUS; SUBCUTANEOUS
Status: DISCONTINUED | OUTPATIENT
Start: 2019-05-06 | End: 2019-05-08 | Stop reason: HOSPADM

## 2019-05-06 RX ORDER — HYDROMORPHONE HYDROCHLORIDE 1 MG/ML
.3-.5 INJECTION, SOLUTION INTRAMUSCULAR; INTRAVENOUS; SUBCUTANEOUS EVERY 10 MIN PRN
Status: DISCONTINUED | OUTPATIENT
Start: 2019-05-06 | End: 2019-05-08 | Stop reason: HOSPADM

## 2019-05-06 RX ORDER — ONDANSETRON 4 MG/1
4 TABLET, ORALLY DISINTEGRATING ORAL EVERY 30 MIN PRN
Status: DISCONTINUED | OUTPATIENT
Start: 2019-05-06 | End: 2019-05-08 | Stop reason: HOSPADM

## 2019-05-06 RX ORDER — METOPROLOL TARTRATE 1 MG/ML
1-2 INJECTION, SOLUTION INTRAVENOUS EVERY 5 MIN PRN
Status: DISCONTINUED | OUTPATIENT
Start: 2019-05-06 | End: 2019-05-06 | Stop reason: HOSPADM

## 2019-05-06 RX ORDER — MORPHINE SULFATE 1 MG/ML
INJECTION, SOLUTION EPIDURAL; INTRATHECAL; INTRAVENOUS PRN
Status: DISCONTINUED | OUTPATIENT
Start: 2019-05-06 | End: 2019-05-06

## 2019-05-06 RX ORDER — ALBUTEROL SULFATE 0.83 MG/ML
2.5 SOLUTION RESPIRATORY (INHALATION) EVERY 4 HOURS PRN
Status: DISCONTINUED | OUTPATIENT
Start: 2019-05-06 | End: 2019-05-06 | Stop reason: HOSPADM

## 2019-05-06 RX ORDER — SODIUM CHLORIDE, SODIUM LACTATE, POTASSIUM CHLORIDE, CALCIUM CHLORIDE 600; 310; 30; 20 MG/100ML; MG/100ML; MG/100ML; MG/100ML
INJECTION, SOLUTION INTRAVENOUS CONTINUOUS
Status: DISCONTINUED | OUTPATIENT
Start: 2019-05-06 | End: 2019-05-06 | Stop reason: HOSPADM

## 2019-05-06 RX ORDER — OXYCODONE HYDROCHLORIDE 5 MG/1
5 TABLET ORAL EVERY 4 HOURS PRN
Status: DISCONTINUED | OUTPATIENT
Start: 2019-05-06 | End: 2019-05-08 | Stop reason: HOSPADM

## 2019-05-06 RX ORDER — SODIUM CHLORIDE, SODIUM LACTATE, POTASSIUM CHLORIDE, CALCIUM CHLORIDE 600; 310; 30; 20 MG/100ML; MG/100ML; MG/100ML; MG/100ML
INJECTION, SOLUTION INTRAVENOUS CONTINUOUS
Status: DISCONTINUED | OUTPATIENT
Start: 2019-05-06 | End: 2019-05-08 | Stop reason: HOSPADM

## 2019-05-06 RX ORDER — LIDOCAINE HYDROCHLORIDE 10 MG/ML
INJECTION, SOLUTION INFILTRATION; PERINEURAL PRN
Status: DISCONTINUED | OUTPATIENT
Start: 2019-05-06 | End: 2019-05-06

## 2019-05-06 RX ORDER — KETOROLAC TROMETHAMINE 30 MG/ML
30 INJECTION, SOLUTION INTRAMUSCULAR; INTRAVENOUS EVERY 6 HOURS
Status: COMPLETED | OUTPATIENT
Start: 2019-05-06 | End: 2019-05-07

## 2019-05-06 RX ORDER — ACETAMINOPHEN 325 MG/1
650 TABLET ORAL EVERY 4 HOURS PRN
Status: DISCONTINUED | OUTPATIENT
Start: 2019-05-09 | End: 2019-05-08 | Stop reason: HOSPADM

## 2019-05-06 RX ORDER — ONDANSETRON 2 MG/ML
4 INJECTION INTRAMUSCULAR; INTRAVENOUS EVERY 6 HOURS PRN
Status: DISCONTINUED | OUTPATIENT
Start: 2019-05-06 | End: 2019-05-08 | Stop reason: HOSPADM

## 2019-05-06 RX ORDER — FENTANYL CITRATE 50 UG/ML
25-50 INJECTION, SOLUTION INTRAMUSCULAR; INTRAVENOUS
Status: DISCONTINUED | OUTPATIENT
Start: 2019-05-06 | End: 2019-05-08 | Stop reason: HOSPADM

## 2019-05-06 RX ORDER — LANOLIN 100 %
OINTMENT (GRAM) TOPICAL
Status: DISCONTINUED | OUTPATIENT
Start: 2019-05-06 | End: 2019-05-08 | Stop reason: HOSPADM

## 2019-05-06 RX ORDER — ONDANSETRON 2 MG/ML
4 INJECTION INTRAMUSCULAR; INTRAVENOUS EVERY 4 HOURS PRN
Status: DISCONTINUED | OUTPATIENT
Start: 2019-05-06 | End: 2019-05-08 | Stop reason: HOSPADM

## 2019-05-06 RX ORDER — OXYTOCIN/0.9 % SODIUM CHLORIDE 30/500 ML
340 PLASTIC BAG, INJECTION (ML) INTRAVENOUS CONTINUOUS PRN
Status: DISCONTINUED | OUTPATIENT
Start: 2019-05-06 | End: 2019-05-08 | Stop reason: HOSPADM

## 2019-05-06 RX ORDER — PROPOFOL 10 MG/ML
INJECTION, EMULSION INTRAVENOUS PRN
Status: DISCONTINUED | OUTPATIENT
Start: 2019-05-06 | End: 2019-05-06

## 2019-05-06 RX ORDER — FENTANYL CITRATE 50 UG/ML
25-50 INJECTION, SOLUTION INTRAMUSCULAR; INTRAVENOUS EVERY 5 MIN PRN
Status: DISCONTINUED | OUTPATIENT
Start: 2019-05-06 | End: 2019-05-06 | Stop reason: HOSPADM

## 2019-05-06 RX ORDER — KETOROLAC TROMETHAMINE 30 MG/ML
30 INJECTION, SOLUTION INTRAMUSCULAR; INTRAVENOUS EVERY 6 HOURS PRN
Status: DISCONTINUED | OUTPATIENT
Start: 2019-05-06 | End: 2019-05-06 | Stop reason: ALTCHOICE

## 2019-05-06 RX ORDER — AMOXICILLIN 250 MG
2 CAPSULE ORAL 2 TIMES DAILY PRN
Status: DISCONTINUED | OUTPATIENT
Start: 2019-05-06 | End: 2019-05-08 | Stop reason: HOSPADM

## 2019-05-06 RX ORDER — CEFAZOLIN SODIUM 1 G/3ML
1 INJECTION, POWDER, FOR SOLUTION INTRAMUSCULAR; INTRAVENOUS SEE ADMIN INSTRUCTIONS
Status: DISCONTINUED | OUTPATIENT
Start: 2019-05-06 | End: 2019-05-06 | Stop reason: HOSPADM

## 2019-05-06 RX ORDER — LIDOCAINE 40 MG/G
CREAM TOPICAL
Status: DISCONTINUED | OUTPATIENT
Start: 2019-05-06 | End: 2019-05-08 | Stop reason: HOSPADM

## 2019-05-06 RX ORDER — CITRIC ACID/SODIUM CITRATE 334-500MG
30 SOLUTION, ORAL ORAL
Status: COMPLETED | OUTPATIENT
Start: 2019-05-06 | End: 2019-05-06

## 2019-05-06 RX ORDER — LEVOTHYROXINE SODIUM 150 UG/1
150 TABLET ORAL DAILY
Status: DISCONTINUED | OUTPATIENT
Start: 2019-05-07 | End: 2019-05-08 | Stop reason: HOSPADM

## 2019-05-06 RX ORDER — SIMETHICONE 80 MG
80 TABLET,CHEWABLE ORAL 4 TIMES DAILY PRN
Status: DISCONTINUED | OUTPATIENT
Start: 2019-05-06 | End: 2019-05-08 | Stop reason: HOSPADM

## 2019-05-06 RX ORDER — AMOXICILLIN 250 MG
1 CAPSULE ORAL 2 TIMES DAILY PRN
Status: DISCONTINUED | OUTPATIENT
Start: 2019-05-06 | End: 2019-05-08 | Stop reason: HOSPADM

## 2019-05-06 RX ORDER — IBUPROFEN 800 MG/1
800 TABLET, FILM COATED ORAL EVERY 6 HOURS PRN
Status: DISCONTINUED | OUTPATIENT
Start: 2019-05-07 | End: 2019-05-08 | Stop reason: HOSPADM

## 2019-05-06 RX ORDER — OXYTOCIN/0.9 % SODIUM CHLORIDE 30/500 ML
100 PLASTIC BAG, INJECTION (ML) INTRAVENOUS CONTINUOUS
Status: DISCONTINUED | OUTPATIENT
Start: 2019-05-06 | End: 2019-05-08 | Stop reason: HOSPADM

## 2019-05-06 RX ORDER — ACETAMINOPHEN 325 MG/1
975 TABLET ORAL EVERY 8 HOURS
Status: DISCONTINUED | OUTPATIENT
Start: 2019-05-06 | End: 2019-05-08 | Stop reason: HOSPADM

## 2019-05-06 RX ORDER — DEXTROSE, SODIUM CHLORIDE, SODIUM LACTATE, POTASSIUM CHLORIDE, AND CALCIUM CHLORIDE 5; .6; .31; .03; .02 G/100ML; G/100ML; G/100ML; G/100ML; G/100ML
INJECTION, SOLUTION INTRAVENOUS CONTINUOUS
Status: DISCONTINUED | OUTPATIENT
Start: 2019-05-06 | End: 2019-05-08 | Stop reason: HOSPADM

## 2019-05-06 RX ORDER — LIDOCAINE HCL/EPINEPHRINE/PF 2%-1:200K
VIAL (ML) INJECTION PRN
Status: DISCONTINUED | OUTPATIENT
Start: 2019-05-06 | End: 2019-05-06

## 2019-05-06 RX ORDER — HYDROCORTISONE 2.5 %
CREAM (GRAM) TOPICAL 3 TIMES DAILY PRN
Status: DISCONTINUED | OUTPATIENT
Start: 2019-05-06 | End: 2019-05-08 | Stop reason: HOSPADM

## 2019-05-06 RX ORDER — NALBUPHINE HYDROCHLORIDE 10 MG/ML
2.5-5 INJECTION, SOLUTION INTRAMUSCULAR; INTRAVENOUS; SUBCUTANEOUS EVERY 6 HOURS PRN
Status: DISCONTINUED | OUTPATIENT
Start: 2019-05-06 | End: 2019-05-08 | Stop reason: HOSPADM

## 2019-05-06 RX ORDER — ONDANSETRON 2 MG/ML
4 INJECTION INTRAMUSCULAR; INTRAVENOUS EVERY 30 MIN PRN
Status: DISCONTINUED | OUTPATIENT
Start: 2019-05-06 | End: 2019-05-08 | Stop reason: HOSPADM

## 2019-05-06 RX ORDER — NALOXONE HYDROCHLORIDE 0.4 MG/ML
.1-.4 INJECTION, SOLUTION INTRAMUSCULAR; INTRAVENOUS; SUBCUTANEOUS
Status: ACTIVE | OUTPATIENT
Start: 2019-05-06 | End: 2019-05-07

## 2019-05-06 RX ORDER — SCOLOPAMINE TRANSDERMAL SYSTEM 1 MG/1
1 PATCH, EXTENDED RELEASE TRANSDERMAL
Status: DISCONTINUED | OUTPATIENT
Start: 2019-05-06 | End: 2019-05-08 | Stop reason: HOSPADM

## 2019-05-06 RX ORDER — ONDANSETRON 2 MG/ML
INJECTION INTRAMUSCULAR; INTRAVENOUS PRN
Status: DISCONTINUED | OUTPATIENT
Start: 2019-05-06 | End: 2019-05-06

## 2019-05-06 RX ORDER — HYDRALAZINE HYDROCHLORIDE 20 MG/ML
2.5-5 INJECTION INTRAMUSCULAR; INTRAVENOUS EVERY 10 MIN PRN
Status: DISCONTINUED | OUTPATIENT
Start: 2019-05-06 | End: 2019-05-06 | Stop reason: HOSPADM

## 2019-05-06 RX ORDER — OXYTOCIN/0.9 % SODIUM CHLORIDE 30/500 ML
PLASTIC BAG, INJECTION (ML) INTRAVENOUS PRN
Status: DISCONTINUED | OUTPATIENT
Start: 2019-05-06 | End: 2019-05-06

## 2019-05-06 RX ORDER — FENTANYL CITRATE 50 UG/ML
INJECTION, SOLUTION INTRAMUSCULAR; INTRAVENOUS PRN
Status: DISCONTINUED | OUTPATIENT
Start: 2019-05-06 | End: 2019-05-06

## 2019-05-06 RX ADMIN — LIDOCAINE HYDROCHLORIDE 50 MG: 10 INJECTION, SOLUTION INFILTRATION; PERINEURAL at 05:22

## 2019-05-06 RX ADMIN — FENTANYL CITRATE 150 MCG: 50 INJECTION, SOLUTION INTRAMUSCULAR; INTRAVENOUS at 05:24

## 2019-05-06 RX ADMIN — ACETAMINOPHEN 975 MG: 325 TABLET, FILM COATED ORAL at 20:28

## 2019-05-06 RX ADMIN — ONDANSETRON 4 MG: 2 INJECTION INTRAMUSCULAR; INTRAVENOUS at 04:07

## 2019-05-06 RX ADMIN — Medication 100 MG: at 05:22

## 2019-05-06 RX ADMIN — ACETAMINOPHEN 975 MG: 325 TABLET, FILM COATED ORAL at 12:38

## 2019-05-06 RX ADMIN — SODIUM CHLORIDE, POTASSIUM CHLORIDE, SODIUM LACTATE AND CALCIUM CHLORIDE 1000 ML: 600; 310; 30; 20 INJECTION, SOLUTION INTRAVENOUS at 04:37

## 2019-05-06 RX ADMIN — SODIUM CITRATE AND CITRIC ACID MONOHYDRATE 30 ML: 500; 334 SOLUTION ORAL at 04:50

## 2019-05-06 RX ADMIN — SODIUM CHLORIDE, SODIUM LACTATE, POTASSIUM CHLORIDE, CALCIUM CHLORIDE AND DEXTROSE MONOHYDRATE: 5; 600; 310; 30; 20 INJECTION, SOLUTION INTRAVENOUS at 11:23

## 2019-05-06 RX ADMIN — AZITHROMYCIN MONOHYDRATE 500 MG: 500 INJECTION, POWDER, LYOPHILIZED, FOR SOLUTION INTRAVENOUS at 04:51

## 2019-05-06 RX ADMIN — SODIUM CHLORIDE, POTASSIUM CHLORIDE, SODIUM LACTATE AND CALCIUM CHLORIDE: 600; 310; 30; 20 INJECTION, SOLUTION INTRAVENOUS at 04:53

## 2019-05-06 RX ADMIN — LIDOCAINE HYDROCHLORIDE,EPINEPHRINE BITARTRATE 5 ML: 20; .005 INJECTION, SOLUTION EPIDURAL; INFILTRATION; INTRACAUDAL; PERINEURAL at 04:56

## 2019-05-06 RX ADMIN — OXYTOCIN-SODIUM CHLORIDE 0.9% IV SOLN 30 UNIT/500ML 399 ML: 30-0.9/5 SOLUTION at 05:58

## 2019-05-06 RX ADMIN — KETOROLAC TROMETHAMINE 30 MG: 30 INJECTION, SOLUTION INTRAMUSCULAR at 08:05

## 2019-05-06 RX ADMIN — LIDOCAINE HYDROCHLORIDE,EPINEPHRINE BITARTRATE 5 ML: 20; .005 INJECTION, SOLUTION EPIDURAL; INFILTRATION; INTRACAUDAL; PERINEURAL at 05:02

## 2019-05-06 RX ADMIN — LIDOCAINE HYDROCHLORIDE,EPINEPHRINE BITARTRATE 5 ML: 20; .005 INJECTION, SOLUTION EPIDURAL; INFILTRATION; INTRACAUDAL; PERINEURAL at 05:09

## 2019-05-06 RX ADMIN — OXYTOCIN-SODIUM CHLORIDE 0.9% IV SOLN 30 UNIT/500ML 1 ML: 30-0.9/5 SOLUTION at 05:28

## 2019-05-06 RX ADMIN — KETOROLAC TROMETHAMINE 30 MG: 30 INJECTION, SOLUTION INTRAMUSCULAR at 20:28

## 2019-05-06 RX ADMIN — PROPOFOL 200 MG: 10 INJECTION, EMULSION INTRAVENOUS at 05:22

## 2019-05-06 RX ADMIN — SODIUM CHLORIDE, POTASSIUM CHLORIDE, SODIUM LACTATE AND CALCIUM CHLORIDE: 600; 310; 30; 20 INJECTION, SOLUTION INTRAVENOUS at 01:53

## 2019-05-06 RX ADMIN — KETOROLAC TROMETHAMINE 30 MG: 30 INJECTION, SOLUTION INTRAMUSCULAR at 14:16

## 2019-05-06 RX ADMIN — OXYTOCIN-SODIUM CHLORIDE 0.9% IV SOLN 30 UNIT/500ML 340 ML/HR: 30-0.9/5 SOLUTION at 06:48

## 2019-05-06 RX ADMIN — LIDOCAINE HYDROCHLORIDE,EPINEPHRINE BITARTRATE 10 ML: 20; .005 INJECTION, SOLUTION EPIDURAL; INFILTRATION; INTRACAUDAL; PERINEURAL at 04:44

## 2019-05-06 RX ADMIN — MORPHINE SULFATE 3 MG: 1 INJECTION, SOLUTION EPIDURAL; INTRATHECAL; INTRAVENOUS at 05:34

## 2019-05-06 RX ADMIN — CEFAZOLIN SODIUM 2 G: 2 INJECTION, SOLUTION INTRAVENOUS at 05:13

## 2019-05-06 RX ADMIN — ONDANSETRON 4 MG: 2 INJECTION INTRAMUSCULAR; INTRAVENOUS at 05:50

## 2019-05-06 NOTE — ANESTHESIA POSTPROCEDURE EVALUATION
Patient: Yary Muñoz    * No procedures listed *    Diagnosis:* No pre-op diagnosis entered *  Diagnosis Additional Information: .Intrauterine Pregnancy, Labor      Anesthesia Type:  Epidural    Note:  Anesthesia Post Evaluation    Patient location during evaluation: PACU  Patient participation: Able to fully participate in evaluation  Level of consciousness: awake  Pain management: adequate  Airway patency: patent  Cardiovascular status: acceptable  Respiratory status: acceptable  Hydration status: acceptable  PONV: controlled     Anesthetic complications: None    Comments: Patient went to , GA required        Last vitals:  Vitals:    19 0810 19 0815 19 0820   BP:   108/62   Pulse:      Resp:      Temp:   (P) 99  F (37.2  C)   SpO2: 96% 96% 96%         Electronically Signed By: Jorge Luis Christina DO  May 6, 2019  9:05 AM

## 2019-05-06 NOTE — ANESTHESIA POSTPROCEDURE EVALUATION
Patient: Yary Muñoz    Procedure(s):   SECTION    Diagnosis:feilor to decent  Diagnosis Additional Information: Preop Diagnosis: IUP 41.6 weeks arrest of 2nd stage of labor, CPD, mec staining     Post-Op Diagnosis: same; nuchal cord times 2; persistent occiput transverse presentation     Procedure: LST  section and Manual placental removal        Anesthesia Type:  General, RSI    Note:  Anesthesia Post Evaluation    Patient location during evaluation: Bedside and PACU  Patient participation: Able to fully participate in evaluation  Level of consciousness: awake  Pain management: adequate  Airway patency: patent  Cardiovascular status: acceptable  Respiratory status: acceptable  Hydration status: acceptable  PONV: controlled     Anesthetic complications: None          Last vitals:  Vitals:    19 0620 19 0630 19 0640   BP: (!) 79/33 95/73 125/59   Pulse: 126 132 110   Resp: 25 15 22   Temp:      SpO2: 100% 99% 100%         Electronically Signed By: Eleuterio Steve MD  May 6, 2019  6:46 AM

## 2019-05-06 NOTE — PLAN OF CARE
Data: Yary Muñoz transferred to 443 via cart at 0900. Baby transferred via parent's arms.  Action: Receiving unit notified of transfer: Yes. Patient and family notified of room change. Report given to ENDY King at 0915. Belongings sent to receiving unit. Accompanied by Registered Nurse. Oriented patient to surroundings. Call light within reach.   Response: Patient tolerated transfer and is stable.    Patients mobililty level scored using the bedside mobility assistance tool (BMAT). Patient is at a mobility level test number: 1. Mobility equipment used: hovermat. Required assist of 3 staff members. Further use of BMAT scoring required.

## 2019-05-06 NOTE — PROGRESS NOTES
"CNM PROGRESS NOTE    SUBJECTIVE: Into room to check on patient. Patient resting comfortably with epidural.  at bedside.    OBJECTIVE:  BP 99/52   Temp 98.4  F (36.9  C)   Resp 16   Ht 1.549 m (5' 1\")   Wt 67.1 kg (148 lb)   LMP 07/17/2018   BMI 27.96 kg/m      Fetal heart tones: Baseline 120 bpm   Variability: Minimal  Accelerations: absent  Decelerations: present x 1    Contractions: Pt is tiffanie every 2-4 minutes, lasting 60-90 seconds    Cervix: 6/ 100% / -2, Vtx; anterior lip swollen  ROM: clear fluid    Pitocin- 4 mu/min.  Antibiotics- none  Cervical ripening: N/A    ASSESSMENT:  IUP @ 41w5d little progress   GBS- negative  Inadequate labor     PLAN:   Dr. Delgado consulted regarding lack of labor progress, FHTs and swollen cervix. Plan to continue with pitocin augmentation and recheck cervix in 2 hours.      RAFAEL SULLIVAN CNM    "

## 2019-05-06 NOTE — OP NOTE
Procedure Date: 2019      PREOPERATIVE DIAGNOSES:   1.  Intrauterine pregnancy at 41-6/7 weeks' gestation.   2.  Arrest of second stage of labor.   3.  Cephalopelvic disproportion.   4.  Meconium staining.      POSTOPERATIVE DIAGNOSES:   1.  Intrauterine pregnancy at 41-6/7 weeks' gestation.   2.  Arrest of second stage of labor.   3.  Cephalopelvic disproportion.   4.  Meconium staining.   5.  Nuchal cord x 2.   6.  Occiput transverse presentation     PROCEDURE:  Low segment transverse  section with manual placental removal.      SURGEON:  Enrique Delgado MD      ASSISTANTS:  None.      HISTORY OF PRESENT ILLNESS:  The patient is a 28-year-old white female,  1, para 0-0-0-0, at 41-6/7 weeks' gestation, who I was asked to see by the Midwife Service for evaluation of an arrest of second stage of labor.  The patient had been complete and pushing for greater than 3-1/2 hours.  She has had a prolonged dysfunctional first stage of labor.  I estimated fetal weight to be greater than 8 pounds.  I checked clinical pelvimetry and found clinical pelvimetry to be borderline for this estimated fetal weight.  I reviewed these findings with the patient and her .  I discussed with them at length the risks, benefits and alternative forms of therapy and a decision was made to proceed with a  section.  I think they have a good understanding of the nature of the problem, the nature of the procedure, the risks, the benefits, the alternatives.  Her questions have been answered.  Informed consent has been obtained.      OPERATIVE FINDINGS:  The infant was an 8 pound 4 ounce male infant, Apgars 6 and 9 at 1 and 5 minutes respectively, delivered from a left occiput transverse presentation.  There was a nuchal cord x 2, which was reduced prior to delivery of the shoulders.      DETAILS OF PROCEDURE:  After obtaining informed consent, the patient was taken to the operating room where her epidural block was  bolused.  After an appropriate period of observation, the patient did not achieve an adequate level of analgesia for surgery and a decision was made to proceed with a rapid sequence induction of general anesthetic.  This was accomplished after the patient had been prepped and draped in the usual sterile fashion.  With adequate analgesia present, a Pfannenstiel skin incision was made and carried down to the level of the fascia.  Fascia was nicked in the midline.  The fascial incision extended laterally in the right and left direction with a Palencia scissors.  The fascia was then meticulously dissected off the underlying rectus muscles, inferior to the top of the symphysis, superiorly to the level of the umbilicus.  Rectus diastasis was divided in the midline, the peritoneum identified, grasped with 2 Princess, tented, entered with Metzenbaum scissors, and the incision was extended superiorly and inferiorly.  A bladder blade was placed inferiorly, the vesicouterine reflection identified, grasped with pickups, incised with Metzenbaum scissors, and the incision extended laterally in the right and left direction.  The bladder was then meticulously dissected off the lower uterine segment.  Following this, a low segment transverse uterine incision was made.  The endometrial cavity was entered with a blunt end of the scalpel handle and the incision extended laterally with finger fracture.  There was light meconium staining present.  The vertex was gently elevated from the pelvis and delivered atraumatically with fundal pressure.  Oropharynx and nasopharynx were suctioned with bulb suction.  There was a nuchal cord x 2, which was reduced prior to delivery of the shoulders.  The remainder of the infant was delivered, the cord was clamped, and infant was handed off to the resuscitation team, who were present for the delivery.  The placenta was delivered by manual extraction.  The uterus was exteriorized and closed in the following  manner.  The right and left lateral margins were closed with figure-of-X suture of #1 Monocryl.  The first layer was then closed with a running locked suture of #1 Monocryl.  A second vertical imbricating suture of #1 Monocryl was placed superior to the first.  This resulted in good hemostasis.  There was no evidence of any extension of the lateral incision in the lower uterine segment.  With good hemostasis, the bladder flap was reapproximated with a running suture of 3-0 Monocryl.  Fallopian tubes and ovaries were inspected and found to be normal bilaterally.  The pelvis was suctioned of excess clot and heme.  The uterus was then replaced in the abdominal cavity.  At this point, we paused.  We documented that sponge, needle and instrument counts had all been checked and were correct x 2, that hemostasis had been checked and was acceptable.  With counts correct and hemostasis acceptable, the peritoneum was closed with a running suture of 2-0 Vicryl.  Subfascial area was inspected, found to be hemostatic, and the fascia was reapproximated with a running suture of 0 looped PDS.  Subcutaneous fat layer was irrigated and reapproximated with a running suture of 3-0 plain and skin was closed with an Insorb stapler.  Quantitated blood loss is   786 mL.  .         SKYLER DELGADO MD             D: 2019   T: 2019   MT: BLAKE      Name:     REBEKAH ROMERO   MRN:      [id number]        Account:        [id number]   :      1990           Procedure Date: 2019      Document: [id number]       cc: Tala Delgado MD

## 2019-05-06 NOTE — PROGRESS NOTES
"JERICA PROGRESS NOTE    SUBJECTIVE:  In room pushing with patient. Patient appears to be tiring.  at bedside.    OBJECTIVE:  /60   Temp 99  F (37.2  C) (Oral)   Resp 18   Ht 1.549 m (5' 1\")   Wt 67.1 kg (148 lb)   LMP 2018   BMI 27.96 kg/m      Fetal heart tones: Baseline 150 bpm   Variability: Moderate  Accelerations: absent  Decelerations: present (variable)    Contractions: Pt is tiffanie every 2-3 minutes, lasting 60-90 seconds    Cervix: 10/ 100% / +2, Vtx  ROM: light meconium fluid     Pitocin- 5 mu/min.  Antibiotics- none    ASSESSMENT:  IUP @ 41w6d second stage labor and good progress  GBS- negative       PLAN:   Continue pushing  Anticipate     RAFAEL SULLIVAN CNM    "

## 2019-05-06 NOTE — PROGRESS NOTES
JERICA Courtesy Rounds  S/P Primary , Day 0    Yary is awake and alert in bed, with infant.  is at the bedside. Discussed labor & subsequent delivery via . Patient states while it is not what she was planning or hoping for, she is happy to have had a safe delivery and healthy baby. She does not have any questions at this time. Encouraged rest. Discussed and recommended First Days, postpartum group. Will plan to continue JERICA courtesy rounds until discharge.     Estelita Knowles CNM, H[facility]-BC

## 2019-05-06 NOTE — PROVIDER NOTIFICATION
05/05/19 2025   Provider Notification   Provider Name/Title Tala PIZANO   Method of Notification Electronic Page   Request Evaluate in Person   Notification Reason Decels   Tala Wetzel paged to come to room to evaluate prolonged decel. Decel lasted 5.5 minutes with cristine down to 80. Changed position form left to right, IV fluid bolus started and oxygen given.

## 2019-05-06 NOTE — PROVIDER NOTIFICATION
05/06/19 0234   Provider Notification   Provider Name/Title JERICA Edgar   Method of Notification At Bedside   Updated re recurrent VD, moderate variability, no accels this shift.  pitocin at 5mU. Orders received to keep pitocin at 5mU, no fluid bolus needed. Continue with pushing.

## 2019-05-06 NOTE — LACTATION NOTE
This note was copied from a baby's chart.  LC visit and assist with latch.  Infant is still clearing some amniotic fluid, but was able to latch to both sides with a few big swallows.  Plan for frequent burping.  LC provided education about nursing often and on demand as well as at least every 3 hours.  She is aware that she may call prn for assistance.  LC will follow up tomorrow.

## 2019-05-06 NOTE — CONSULTS
Consult Date:  2019      CHIEF COMPLAINT:  Intrauterine pregnancy 41-6/7 weeks' gestation, arrest of second stage of labor.       HISTORY OF PRESENT ILLNESS:  The patient is a 28-year-old white female,  1, para 0-0-0-0, EDC of 2019, blood group A, Rh positive, rubella status immune, GBS negative, who I was asked to see by the Midwife Service for evaluation of arrest of second stage of labor.  The patient was admitted to Labor and Delivery at approximately 4:22 on the morning of 19 and, at that time was noted to be 3 cm dilated.  The patient has made slow progress.  Membranes ruptured with return of lightly meconium-stained fluid at 1601 hours on 2019 labor.  Pitocin augmentation was started, an intrauterine pressure catheter was placed and the patient progressed to complete at 2229 on 2019.  The patient was elected to allow to labor down.  She began pushing at 0033 hours.  The patient has now been pushing for 3-1/2 hours and I am asked to see the patient for evaluation.  Fetal heart rate tracing has been reviewed, past medical history has been reviewed.      ALLERGIES:  None.      MEDICATIONS:  Multivitamins.      Smoking, alcohol, drug use.  previous surgeries, review of systems 12-point check, family history, social history, have all been reviewed.      PHYSICAL EXAMINATION:   GENERAL:  Pleasant female.    VITAL SIGNS:  Afebrile.  Vital signs are stable on examination today.   ABDOMEN:  Dang infant, vertex presentation with an estimated fetal weight of greater than 8 pounds.  The patient has an epidural block in place.  Fetal heart rate tracing at this point is category 1.  The patient is having good quality irregular contractions.     PELVIC EXAMINATION:  A dang infant present in vertex presentation.  There was significant caput present.  The vertex is about a +1 perhaps +2 station.        Clinical pelvimetry reveals the pubic arch to be 90 degrees, ischial spines are  prominent, sacrospinous ligaments are tight, 2 fingerbreadths.  Diagonal conjugate approximately 12 cm.  Sacrum is a shallow and prominent.      ASSESSMENT:  Intrauterine pregnancy 41-6/7 weeks' gestation, dysfunctional labor pattern, now with complete and pushing for 3-1/2 hours with an epidural block in place with failure to progress.      RECOMMENDATIONS:  I am not comfortable instrumenting this patient at this time.  I believe that she has a good-sized baby and with her clinical pelvic measurements I am not comfortable instrumenting.  The vertex is +1 to +2 station with prominent caput.  I reviewed this finding with the patient and her significant other.  At this point, I recommend  section.  The patient and her  will think about this and let us know of their decision.  I have discussed the risks, benefits and alternatives to  section, including extension of the uterine incision in the lower uterine segment.  I think they have a good understanding of the nature of the problem, the nature of the procedure, the risks, the benefits and the alternatives.  All their questions have been answered and informed consent has been obtained.         SKYLER DELGADO MD             D: 2019   T: 2019   MT: GROVER      Name:     REBEKAH ROMERO   MRN:      [id number]        Account:       [id number]   :      1990           Consult Date:  2019      Document: [id number]       cc: Rubi Delgado MD

## 2019-05-06 NOTE — PROGRESS NOTES
"CLARAM PROGRESS NOTE    SUBJECTIVE:  Patient pushing with good effort but tired.     OBJECTIVE:  /53   Temp 99.5  F (37.5  C) (Oral)   Resp 18   Ht 1.549 m (5' 1\")   Wt 67.1 kg (148 lb)   LMP 07/17/2018   BMI 27.96 kg/m      Fetal heart tones: Baseline 150 bpm  Variability: moderate with periods of minimal  Accelerations: absent  Decelerations: present (recurrent variable)    Contractions: Pt is tiffanie every 2-3 minutes, lasting 60-90 seconds     Cervix: 10/ 100% / +2, Vtx  ROM: moderate meconium fluid    Pitocin- 5 mu/min.  Antibiotics- none      ASSESSMENT:  IUP @ 41w6d second stage labor and minimal progress   GBS- negative     PLAN:   Dr Delgado consulted apprised of situation and asked to come assess. Will continue to push,    RAFAEL SULLIVAN CNM    "

## 2019-05-06 NOTE — ANESTHESIA CARE TRANSFER NOTE
Patient: Yary Muñoz    Procedure(s):   SECTION    Diagnosis: feilor to decent  Diagnosis Additional Information: No value filed.    Anesthesia Type:   No value filed.     Note:  Airway :Face Mask  Patient transferred to:PACU  Comments: Spont Resps. Follows commands. Extubated. Maintains Resps. Tx to pacu. Report to RNHandoff Report: Identifed the Patient, Identified the Reponsible Provider, Reviewed the pertinent medical history, Discussed the surgical course, Reviewed Intra-OP anesthesia mangement and issues during anesthesia, Set expectations for post-procedure period and Allowed opportunity for questions and acknowledgement of understanding      Vitals: (Last set prior to Anesthesia Care Transfer)    CRNA VITALS  2019 0543 - 2019 0622      2019             Pulse:  136    SpO2:  97 %                Electronically Signed By: CHALINO Garcia CRNA  May 6, 2019  6:22 AM

## 2019-05-06 NOTE — ANESTHESIA PREPROCEDURE EVALUATION
"Anesthesia Pre-Procedure Evaluation    Patient: Yary Muñoz   MRN: [id number] : 1990          Preoperative Diagnosis: feilor to decent    Procedure(s):   SECTION    Past Medical History:   Diagnosis Date     Hypothyroid 2013    irregular period, weight gain, family history     NO ACTIVE PROBLEMS      Past Surgical History:   Procedure Laterality Date     none       [facility]       Anesthesia Evaluation     . Pt has not had prior anesthetic            ROS/MED HX    ENT/Pulmonary:  - neg pulmonary ROS    (-) sleep apnea   Neurologic:       Cardiovascular:  - neg cardiovascular ROS       METS/Exercise Tolerance:     Hematologic:         Musculoskeletal:         GI/Hepatic:         Renal/Genitourinary:         Endo:         Psychiatric:         Infectious Disease:         Malignancy:         Other:                          Physical Exam      Airway   Mallampati: II  TM distance: >3 FB  Neck ROM: full    Dental     Cardiovascular   Rhythm and rate: regular and normal      Pulmonary    breath sounds clear to auscultation            Lab Results   Component Value Date    WBC 9.1 2019    HGB 13.6 2019    HCT 35.4 2019     2019    TSH 1.49 04/15/2019    T4 1.19 04/15/2019       Preop Vitals  BP Readings from Last 3 Encounters:   19 114/53   19 102/62   19 108/70    Pulse Readings from Last 3 Encounters:   18 66   01/10/18 84   07/10/12 70      Resp Readings from Last 3 Encounters:   19 18   07/10/12 14   11 18    SpO2 Readings from Last 3 Encounters:   01/10/18 99%   07/10/12 98%   11 98%      Temp Readings from Last 1 Encounters:   19 99.5  F (37.5  C) (Oral)    Ht Readings from Last 1 Encounters:   19 1.549 m (5' 1\")      Wt Readings from Last 1 Encounters:   19 67.1 kg (148 lb)    Estimated body mass index is 27.96 kg/m  as calculated from the following:    Height as of this encounter: 1.549 m (5' 1\").    " Weight as of this encounter: 67.1 kg (148 lb).       Anesthesia Plan      History & Physical Review  History and physical reviewed and following examination; no interval change.    ASA Status:  2 emergent.    NPO Status:  > 8 hours    Plan for Epidural   PONV prophylaxis:  Ondansetron (or other 5HT-3) and Dexamethasone or Solumedrol       Postoperative Care  Postoperative pain management:  IV analgesics, Oral pain medications, Neuraxial analgesia and Multi-modal analgesia.      Consents  Anesthetic plan, risks, benefits and alternatives discussed with:  Patient..                 Eleuterio Steve MD                    .

## 2019-05-06 NOTE — PROGRESS NOTES
"CLARAM PROGRESS NOTE    SUBJECTIVE:  Patient comfortable with epidural. Feeling no UCs, pressure or urge to push.  at bedside.    OBJECTIVE:  BP 99/52   Temp 98.4  F (36.9  C)   Resp 16   Ht 1.549 m (5' 1\")   Wt 67.1 kg (148 lb)   LMP 2018   BMI 27.96 kg/m      Fetal heart tones: Baseline 145 bpm   Variability: Moderate  Accelerations: present  Decelerations: absent    Contractions: Pt is tiffanie every 3-4 minutes, lasting 60-90 seconds    Cervix: 10/ 100% / -1, Vtx; fetal molding and caput noted  ROM: clear fluid    Pitocin- 5 mu/min.  Antibiotics- none    ASSESSMENT:  IUP @ 41w5d active labor   GBS- negative  No distress     PLAN:   Labor down 1-2 hours  Anticipate   reevaluate in 2 hours/PRN    RAFAEL SULLIVAN CNM    "

## 2019-05-06 NOTE — BRIEF OP NOTE
Preop Diagnosis: IUP 41.6 weeks arrest of 2nd stage of labor, CPD, mec staining    Post-Op Diagnosis: same; nuchal cord times 2; persistent occiput transverse presentation    Procedure: LST  section and Manual placental removal    Surgeon: SKYLER JULIEN MD, Assistant: none    EBL: 500 ml  QBL pending    Anesthesia: General and Epidural    Path: placenta    Complications: no problems reported  8#4oz male infant apgars 6@1min and 9@5 min    Findings: see dictated operative note for full details        SKYLER JULIEN MD  6:02 AM  2019

## 2019-05-06 NOTE — PLAN OF CARE
Assumed care at 0915. Patient awake and alert. Oriented to area. Instructed patient to call for assistance before getting out of bed. VSS. Family at bedside.    Patients mobililty level scored using the bedside mobility assistance tool (BMAT). Patient is at a mobility level test number: 1  . Mobility equipment used: hovermat. Required assist of 3 staff members. Further use of BMAT scoring required.

## 2019-05-07 LAB
COPATH REPORT: NORMAL
HGB BLD-MCNC: 9.3 G/DL (ref 11.7–15.7)

## 2019-05-07 PROCEDURE — 25000132 ZZH RX MED GY IP 250 OP 250 PS 637: Performed by: OBSTETRICS & GYNECOLOGY

## 2019-05-07 PROCEDURE — 85018 HEMOGLOBIN: CPT | Performed by: OBSTETRICS & GYNECOLOGY

## 2019-05-07 PROCEDURE — 36415 COLL VENOUS BLD VENIPUNCTURE: CPT | Performed by: OBSTETRICS & GYNECOLOGY

## 2019-05-07 PROCEDURE — 12000000 ZZH R&B MED SURG/OB

## 2019-05-07 PROCEDURE — 25000128 H RX IP 250 OP 636: Performed by: OBSTETRICS & GYNECOLOGY

## 2019-05-07 RX ORDER — FERROUS GLUCONATE 324(38)MG
324 TABLET ORAL
Status: DISCONTINUED | OUTPATIENT
Start: 2019-05-07 | End: 2019-05-08 | Stop reason: HOSPADM

## 2019-05-07 RX ADMIN — KETOROLAC TROMETHAMINE 30 MG: 30 INJECTION, SOLUTION INTRAMUSCULAR at 02:57

## 2019-05-07 RX ADMIN — ACETAMINOPHEN 975 MG: 325 TABLET, FILM COATED ORAL at 21:33

## 2019-05-07 RX ADMIN — LEVOTHYROXINE SODIUM 150 MCG: 150 TABLET ORAL at 08:13

## 2019-05-07 RX ADMIN — ACETAMINOPHEN 975 MG: 325 TABLET, FILM COATED ORAL at 12:33

## 2019-05-07 RX ADMIN — ACETAMINOPHEN 975 MG: 325 TABLET, FILM COATED ORAL at 04:38

## 2019-05-07 RX ADMIN — FERROUS GLUCONATE 324 MG: 324 TABLET ORAL at 08:55

## 2019-05-07 RX ADMIN — SENNOSIDES AND DOCUSATE SODIUM 1 TABLET: 8.6; 5 TABLET ORAL at 21:33

## 2019-05-07 RX ADMIN — IBUPROFEN 800 MG: 800 TABLET ORAL at 17:16

## 2019-05-07 RX ADMIN — SENNOSIDES AND DOCUSATE SODIUM 1 TABLET: 8.6; 5 TABLET ORAL at 08:55

## 2019-05-07 RX ADMIN — IBUPROFEN 800 MG: 800 TABLET ORAL at 08:54

## 2019-05-07 NOTE — PROGRESS NOTES
[facility] Obstetrics Post-Op / Progress Note         Assessment and Plan:    Assessment:   Post-operative day #1  Low transverse primary  section  L&D complications: Cephalopelvic disproportion      Doing well.  Clean wound without signs of infection.  No immediate surgical complications identified.  No excessive bleeding  Pain well-controlled.      Plan:   Ambulation encouraged  Monitor wound for signs of infection  Pain control measures as needed  Reportable signs and symptoms dicussed with the patient  Start iron supplementation  Anticipate discharge in 2 days           Interval History:   Doing well.  Pain is well-controlled.  No fevers.  No history of wound drainage, warmth or significant erythema.  Good appetite.  Denies chest pain, shortness of breath, nausea or vomiting.  Ambulatory.  Breastfeeding.          Significant Problems:      Patient Active Problem List   Diagnosis     Hypothyroidism affecting pregnancy in first trimester     Surveillance of previously prescribed contraceptive pill     Encounter for supervision of normal first pregnancy     Encounter for triage in pregnant patient     Normal labor     Postpartum state             Review of Systems:    The patient denies any chest pain, shortness of breath, excessive pain, fever, chills, purulent drainage from the wound, nausea or vomiting.          Medications:   All medications related to the patient's surgery have been reviewed          Physical Exam:     All vitals stable  Patient Vitals for the past 12 hrs:   BP Temp Temp src Heart Rate Resp SpO2   19 0438 104/56 -- -- 74 16 95 %   19 0129 103/60 98  F (36.7  C) Oral 75 16 93 %   19 2028 116/63 98.3  F (36.8  C) Oral 83 18 99 %     Wound clean and dry with minimal or no drainage.  Surrounding skin with minimal erythema.  Ext NT          Data:     Hemoglobin   Date Value Ref Range Status   2019 9.3 (L) 11.7 - 15.7 g/dL Final   2019 13.6 11.7 - 15.7  g/dL Final   01/23/2019 11.8 11.7 - 15.7 g/dL Final   09/20/2018 13.8 11.7 - 15.7 g/dL Final   03/09/2004 14.6 11.7 - 15.7 g/dL Final     -    Reji Jackson MD  5/7/2019 7:51 AM

## 2019-05-07 NOTE — LACTATION NOTE
This note was copied from a baby's chart.  LC visit.  Her baby was spitty yesterday and had difficulty nursing over the night,however has since improved.  LC was not able to observe a feeding, but parents report latching immediately today and no further concerns.  LC will follow up tomorrow.

## 2019-05-07 NOTE — PLAN OF CARE
UAL. VSS. Has been up to the bathroom and voiding in adequate amounts. Taking Ibuprofen and Tylenol for pain and states this has been adequate. Attentive to baby. Meeting expected outcomes. Possibly interested in discharge tomorrow.

## 2019-05-07 NOTE — PLAN OF CARE
Pt stable and meeting expected goals. VSS. On bedrest overnight & borges removed this AM. Pain managed with tylenol and toradol. Breastfeeding is going fairly well; baby was sleepy and spitty overnight. Bonding with infant. Continue to monitor.

## 2019-05-07 NOTE — PLAN OF CARE
Doing well with infant cares, breast feeding with staff assist with positioning and latch, using shield. Out of bed with assist, ambulated in halls, tolerated well. Tolerating regular diet, denies nausea. Scheduled tylenol and scheduled toradol for pain with stated relief. FOB presetn and supportive, participating in baby cares.

## 2019-05-07 NOTE — PROGRESS NOTES
CNM COURTESY VISIT    Stopped in to visit patient. She and  are both doing well. No concerns. Discussed returning to CN service for 6wk PP visit if desired.     Vivian Wetzel CNM on 5/7/2019 at 9:00 AM

## 2019-05-08 VITALS
HEART RATE: 115 BPM | WEIGHT: 148 LBS | RESPIRATION RATE: 18 BRPM | BODY MASS INDEX: 27.94 KG/M2 | DIASTOLIC BLOOD PRESSURE: 67 MMHG | SYSTOLIC BLOOD PRESSURE: 108 MMHG | HEIGHT: 61 IN | OXYGEN SATURATION: 95 % | TEMPERATURE: 97.8 F

## 2019-05-08 PROCEDURE — 25000132 ZZH RX MED GY IP 250 OP 250 PS 637: Performed by: OBSTETRICS & GYNECOLOGY

## 2019-05-08 RX ORDER — FERROUS GLUCONATE 324(38)MG
324 TABLET ORAL
Qty: 60 TABLET | Refills: 0 | Status: SHIPPED | OUTPATIENT
Start: 2019-05-08 | End: 2020-11-23

## 2019-05-08 RX ADMIN — ACETAMINOPHEN 975 MG: 325 TABLET, FILM COATED ORAL at 07:17

## 2019-05-08 RX ADMIN — IBUPROFEN 800 MG: 800 TABLET ORAL at 15:38

## 2019-05-08 RX ADMIN — SENNOSIDES AND DOCUSATE SODIUM 2 TABLET: 8.6; 5 TABLET ORAL at 09:27

## 2019-05-08 RX ADMIN — LEVOTHYROXINE SODIUM 150 MCG: 150 TABLET ORAL at 07:33

## 2019-05-08 RX ADMIN — IBUPROFEN 800 MG: 800 TABLET ORAL at 09:28

## 2019-05-08 RX ADMIN — IBUPROFEN 800 MG: 800 TABLET ORAL at 01:44

## 2019-05-08 NOTE — PLAN OF CARE
Meeting goals for shift and early discharge to home. Caring for self and infant in room and bonding well. Patient is comfortable, declines narcotics. Offered suppository, declined is aware of what to use for stool softener. AVS/DC teaching and medications reviewed with  and given to patient. Patient is aware of when to call and follow-up. Patient is aware of resources available.  Aware of [place] scale and when to retake and call.Teaching is completed. Patient has a list of foods high in iron. Home care faxed. Ready for discharge to home.

## 2019-05-08 NOTE — DISCHARGE INSTRUCTIONS
[facility] [phone]   Lactation [phone]   Postop  Birth Instructions     OB 2&6 weeks    Activity       Do not lift more than 10 pounds for 6 weeks after surgery.  Ask family and friends for help when you need it.    No driving until you have stopped taking your pain medications (usually two weeks after surgery).    No heavy exercise or activity for 6 weeks.  Don't do anything that will put a strain on your surgery site.    Don't strain when using the toilet.  Your care team may prescribe a stool softener if you have problems with your bowel movements.     To care for your incision:       Keep the incision clean and dry.    Do not soak your incision in water. No swimming or hot tubs until it has fully healed. You may soak in the bathtub if the water level is below your incision.    Do not use peroxide, gel, cream, lotion, or ointment on your incision.    Adjust your clothes to avoid pressure on your surgery site (check the elastic in your underwear for example).     You may see a small amount of clear or pink drainage and this is normal.  Check with your health care provider:       If the drainage increases or has an odor.    If the incision reddens, you have swelling, or develop a rash.    If you have increased pain and the medicine we prescribed doesn't help.    If you have a fever above 100.4 F (38 C) with or without chills when placing thermometer under your tongue.   The area around your incision (surgery wound), will feel numb.  This is normal. The numbness should go away in less than a year.     Keep your hands clean:  Always wash your hands before touching your incision (surgery wound). This helps reduce your risk of infection. If your hands aren't dirty, you may use an alcohol hand-rub to clean your hands. Keep your nails clean and short.    Call your healthcare provider if you have any of these symptoms:       You soak a sanitary pad with blood within 1 hour, or you see blood clots  larger than a golf ball.    Bleeding that lasts more than 6 weeks.    Vaginal discharge that smells bad.    Severe pain, cramping or tenderness in your lower belly area.    A need to urinate more frequently (use the toilet more often), more urgently (use the toilet very quickly), or it burns when you urinate.    Nausea and vomiting.    Redness, swelling or pain around a vein in your leg.    Problems breastfeeding or a red or painful area on your breast.    Chest pain and cough or are gasping for air.    Problems with coping with sadness, anxiety or depression. If you have concerns about hurting yourself or the baby, call your provider immediately.      You have questions or concerns after you return home.              Congratulations on the birth of your baby!  Looking to gain more support for the next few weeks ahead? Good news, [facility] is now offering Postpartum Groups to help you and your growing family adjust to your new normal.     [facility] First Days: Mom and Baby Program   Adding a little one to your life results in a lot of questions, from breastfeeding to postpartum recovery. To help you, we ve designed a series of group classes to answer your questions and build a support network for new moms.   The [facility] First Days program is for moms of infants up to 8 weeks old. Whether you are breastfeeding or bottle feeding, there will be lots of helpful information for you at every class! Learn along with other moms in a fun, caring environment. This also is an opportunity to check your baby's weight and get help with any latching issues. Sessions are led by midwife providers & a lactation consultant. You may attend each class, or just those that your schedule allows. The first session is free. The other visits will be billed as postpartum and clinic visits, and may be covered by your insurance.      We will cover a variety of topics every week including:    Session 1: Breastfeeding  Baby  When: First Wednesday of the month   Breastfeeding support and education, including how to get a good latch, feeding schedules, and milk supply.   Session 2: Baby Blues and Birth Stories  When: Second Wednesday of the month   How and why to write your birth story, baby blues and mental health support for postpartum, and how to get sleep while breastfeeding your baby   Session 3: Nutrition & Exercise  When: Third Wednesday of the month   Guidelines for healthy nutrition to nourish mom and establish a good milk supply, practice some fun exercises to help strengthen the abdomen and pelvic floor.   Session 4: Women's Health  When: Fourth Wednesday of the month   Birth control while breastfeeding and beyond, intimacy after baby, and answers to common questions!   Session 5: Special Speakers   On the months that have a fifth Wednesday, we will have special guest speakers that may include physical therapists, lactation consultants, mental health professionals, pediatricians, and others! This will be a free session for any new mothers to attend and learn more about postpartum support.      First Days Postpartum groups meet every Wednesday from 1:30-3:00pm (Check-in begins at 1:00pm)  Call OB line at [phone] to register    [facility]  [address], [facility].   3rd Floor Conference Room   (in the [facility] [address]). Follow the signs!     Postpartum pain control:  It is normal to have abdominal pain following surgery. The goal is to get your pain level to a 3/10, enabling you to walk around comfortably. You may experience cramping for upto 1-2 weeks, particularly while breast feeding.     -Start your day by taking up to 3 tabs of regular strength ibuprofen (600mg), continue every 6 hours as needed for pain.    -You can additionally take 1-2 tabs of tylenol (325-650mg regular strength or 500-1000mg extra strength), every 6 hours for additional pain control  as needed. Take no more than 4g of tylenol daily.     - Narcotic pain medication is available for breakthrough pain, you may find that you need it every 4-6 hours or just at night. You can take 1-2 tabs of oxycodone, roxicodone, or norco as needed. If you are taking a formulation that contains tylenol (oxycodone, norco) do NOT take additional tylenol.     It is best to alternate your medications so you are taking something every 3 hours if you are having continued pain.    For example:  6am: ibuprofen 3 tabs (600 mg)   9am: tylenol 1000 mg  12pm: ibuprofen 600 mg  3pm: tylenol 1000 mg  6pm: ibuprofen 600 mg  9pm: tyelnol 1000 mg  12am: ibuprofen 600 mg    Remember, you can use the oxycodone or other narcotic pain medication in between these times if needed for significant breakthrough pain that makes it hard to walk or function. This may be necessary for 3-7 days, especially.    If you have vaginal pain you can take sitz baths 1-2x/day. Fill the tub with 2-3 inches of warm water and soak the perineal and vaginal area for 10 minutes. Ice or warm packs can also be applied for comfort.      Warning signs:  You can expect some continued spotting or bleeding for the next 4 weeks or so, if you develop significantly higher flow more than 1 pad/hour please call the clinic right away. If you develop fever to 100.4 or greater, increasing pelvic/abdominal pain, or foul smelling discharge please contact the clinic. Increased volume of discharge is normal. If you develop a severe headache, especially with visual changes, please call the clinic.    Limitations:  No lifting > 10lb for 6 weeks  No driving for 2 weeks or while using narcotic pain medications  Nothing per vagina (no intercourse or tampons) for 6 weeks     Constipation  You may use the following products to ease constipation:    1. Stool softeners such as metamucil or benefiber  2. senna 1-2 times daily  3. Fiber supplements  4. miralax (over the counter).  5.  Dulcolax    Please be sure to keep adequately hydrated; 6-8 8oz glasses daily, more if needed to compensate for exercise, sweating, etc.      More specific dosing can be found below:    - Metamucil 28g daily PO with 8oz of water  - senna-docusate 8.6-50 MG per tablet PO 1 tablet, Oral, 2 TIMES DAILY, Start with 1 tablet PO BID, reduce to 1 tablet daily when having daily BMs. Stop for loose stools.  - docusate sodium (COLACE) capsule 100 mg, Oral, 2 TIMES DAILY, To prevent constipation. Hold for loose stools.  - bisacodyl (DULCOLAX) suppository 10 mg, Rectal, DAILY PRN, constipation, Hold for loose stools.       Please contact the clinic with any questions.  Please schedule a follow up appointment with your primary OB/Gyn provider in 6 weeks and sooner if you have any problems.   You can contact the clinic via [facility] or call [facility] at [phone].

## 2019-05-08 NOTE — LACTATION NOTE
This note was copied from a baby's chart.  LC visit.  Her baby has been nursing well and milk volumes are increasing.  No concerns present.  Excellent latch and milk transfer noted.

## 2019-05-08 NOTE — PROGRESS NOTES
Courtesy [facility]: Patient likely being discharged today. She is sad she had a c/s but understands that it was best for her and baby. Discussed future pregnancies and likely requiring c/s deliveries for those as well. Patient will follow up with midwives in the clinic. Na Hua CNM

## 2019-05-08 NOTE — DISCHARGE SUMMARY
[facility]    Discharge Summary  Obstetrics    Date of Admission:  2019  Date of Discharge:  2019  Discharging Provider: Lizeth Melchor MD    Discharge Diagnoses   Primary  section    Procedure/Surgery Information   Procedure: Procedure(s):   SECTION   Surgeon(s): Surgeon(s) and Role:     * Enrique Delgado MD - Primary     History of Present Illness   Yary Muñoz is a 28 year old female who presented with postdates labor, diagnosed with CPD and underwent primary low transverse .    Hospital Course   The patient's hospital course was unremarkable.  She recovered as anticipated and experienced no post-operative complications.  On discharge, her pain was well controlled. Vaginal bleeding is similar to peak menstrual flow.  Voiding without difficulty.  Ambulating well and tolerating a normal diet.  No fever or significant wound drainage.  Breastfeeding well.  Infant is stable.  She was discharged on post-partum day #2 early at her request. Due to anemia, she will continue prenatal vitamins and iron postpartum.    Post-partum hemoglobin:   Hemoglobin   Date Value Ref Range Status   2019 9.3 (L) 11.7 - 15.7 g/dL Final       Lizeth Melchor    Discharge Disposition   Discharged to home   Condition at discharge: Stable    Pending Results     Unresulted Labs Ordered in the Past 30 Days of this Admission     No orders found from 3/6/2019 to 2019.          Primary Care Physician   Rubi Madison    Consultations This Hospital Stay   HOME CARE POST PARTUM/ IP CONSULT  LACTATION IP CONSULT    Discharge Orders      Activity    Review discharge instructions     Reason for your hospital stay    Maternity care     Discharge Instructions - Postpartum visit    Schedule postpartum visit with your provider and return to clinic in 2 and 6 weeks.     Diet    Resume previous diet     Discharge Medications   Current Discharge Medication List      START taking these  medications    Details   ferrous gluconate (FERGON) 324 (38 Fe) MG tablet Take 1 tablet (324 mg) by mouth daily (with breakfast)  Qty: 60 tablet, Refills: 0    Associated Diagnoses: Postpartum state         CONTINUE these medications which have NOT CHANGED    Details   levothyroxine (SYNTHROID/LEVOTHROID) 150 MCG tablet Take 1 tablet (150 mcg) by mouth daily  Qty: 90 tablet, Refills: 0    Associated Diagnoses: Hypothyroidism affecting pregnancy in first trimester      Omega-3 Fatty Acids (FISH OIL PO)       Prenatal Vit-Fe Fumarate-FA (PRENATAL MULTIVITAMIN PLUS IRON) 27-0.8 MG TABS per tablet Take 1 tablet by mouth daily  Qty: 100 tablet, Refills: 3      Misc. Devices (BREAST PUMP) MISC 1 each as needed (as needed for expressing breastmilk)  Qty: 1 each, Refills: 0    Associated Diagnoses: Encounter for supervision of normal first pregnancy in third trimester           Allergies   Allergies   Allergen Reactions     No Known Drug Allergies         yes

## 2019-05-08 NOTE — PLAN OF CARE
Pt stable and meeting expected goals. VSS. Up ad meagan and independent with cares. Pain managed with tylenol and ibuprofen. Breastfeeding is going fairly well. Bonding with infant. Pt may want an early discharge today if possible. Continue to monitor.

## 2019-05-24 ENCOUNTER — PRENATAL OFFICE VISIT (OUTPATIENT)
Dept: OBGYN | Facility: CLINIC | Age: 29
End: 2019-05-24
Payer: COMMERCIAL

## 2019-05-24 VITALS
SYSTOLIC BLOOD PRESSURE: 100 MMHG | HEIGHT: 61 IN | BODY MASS INDEX: 23.6 KG/M2 | DIASTOLIC BLOOD PRESSURE: 64 MMHG | WEIGHT: 125 LBS

## 2019-05-24 PROCEDURE — 99207 ZZC POST PARTUM EXAM: CPT | Performed by: ADVANCED PRACTICE MIDWIFE

## 2019-05-24 ASSESSMENT — MIFFLIN-ST. JEOR: SCORE: 1244.38

## 2019-05-24 NOTE — NURSING NOTE
"Chief Complaint   Patient presents with     Follow Up      2week incisions check       Initial /64 (BP Location: Right arm, Patient Position: Chair, Cuff Size: Adult Regular)   Ht 1.549 m (5' 1\")   Wt 56.7 kg (125 lb)   LMP 2018   Breastfeeding? Yes   BMI 23.62 kg/m   Estimated body mass index is 23.62 kg/m  as calculated from the following:    Height as of this encounter: 1.549 m (5' 1\").    Weight as of this encounter: 56.7 kg (125 lb).  BP completed using cuff size: regular    Questioned patient about current smoking habits.  Pt. has never smoked.          Scar Aponte CMA             "

## 2019-05-24 NOTE — PROGRESS NOTES
"Midwife Postpartum 2 Week Visit    Yary Romero is a 28 year old here for a postpartum checkup & incision check.     Delivery date was 19. She had a  and c/s for FTP of a viable boy, weight 8 pounds 4 oz., with no complications      Since delivery, she has been breast feeding.  She has not had any signs of infection, her lochia is now very light spotting.     She is voiding and having bowel movements without difficulty.       Contraception was discussed and patient desires none.   She  has not had intercourse since delivery.   She complains of no incisional pain.     Mood is Stable  Patient screened for postpartum depression.   EPDS score 2 today    ROS:  12 point review of systems negative other than symptoms noted below.       Current Outpatient Medications:      ferrous gluconate (FERGON) 324 (38 Fe) MG tablet, Take 1 tablet (324 mg) by mouth daily (with breakfast), Disp: 60 tablet, Rfl: 0     levothyroxine (SYNTHROID/LEVOTHROID) 150 MCG tablet, Take 1 tablet (150 mcg) by mouth daily, Disp: 90 tablet, Rfl: 0     Misc. Devices (BREAST PUMP) MISC, 1 each as needed (as needed for expressing breastmilk), Disp: 1 each, Rfl: 0     Omega-3 Fatty Acids (FISH OIL PO), , Disp: , Rfl:      Prenatal Vit-Fe Fumarate-FA (PRENATAL MULTIVITAMIN PLUS IRON) 27-0.8 MG TABS per tablet, Take 1 tablet by mouth daily, Disp: 100 tablet, Rfl: 3.   OB History    Para Term  AB Living   1 1 1 0 0 1   SAB TAB Ectopic Multiple Live Births   0 0 0 0 1      # Outcome Date GA Lbr Edilson/2nd Weight Sex Delivery Anes PTL Lv   1 Term 19 41w6d 10:00 / 06:59 3.742 kg (8 lb 4 oz) M CS-LTranv EPI N GRACE      Complications: Failure to Progress in Second Stage      Name: YAMILE ROMERO      Apgar1: 6  Apgar5: 9     Last pap:  No results found for: PAP      EXAM:  /64 (BP Location: Right arm, Patient Position: Chair, Cuff Size: Adult Regular)   Ht 1.549 m (5' 1\")   Wt 56.7 kg (125 lb)   LMP 2018   " "Breastfeeding? Yes   BMI 23.62 kg/m    BMI: Body mass index is 23.62 kg/m .  Constitutional: healthy, alert and no distress  Breast:deferred, patient lactating.  Abdomen: soft, non-tender, incision well approximated, no drainage, tenderness, or erythema  Psych: Mood appropriate to setting, oriented x 3  PELVIC EXAM:   deferred      ASSESSMENT:   Normal postpartum exam after c/s for FTP.    ICD-10-CM    1. Routine postpartum follow-up Z39.2          PLAN:  Results for orders placed or performed during the hospital encounter of 05/05/19   Treponema Abs w Reflex to RPR and Titer   Result Value Ref Range    Treponema Antibodies Nonreactive NR^Nonreactive   Hemoglobin   Result Value Ref Range    Hemoglobin 13.6 11.7 - 15.7 g/dL   Placenta Path Order and Indications (PLACENTA)   Result Value Ref Range    Copath Report       Patient Name: REBEKAH ROMERO  MR#: [id number]  Specimen #: [id number]  Collected: 5/6/2019  Received: 5/6/2019  Reported: 5/7/2019 07:36  Ordering Phy(s): SKYLER JULIEN    For improved result formatting, select 'View Enhanced Report Format' under   Linked Documents section.    SPECIMEN(S):  Placenta    FINAL DIAGNOSIS:  Placenta.  - 509 g camejo placenta without evidence of infarction or other   specific finding.  - Fetal membranes with mild meconium staining and no evidence of acute   chorioamnionitis.  - Three-vessel umbilical cord without evidence of acute funisitis.    Electronically signed out by:    Nicola Christy M.D.    CLINICAL HISTORY:  Arrest of second stage labor.    GROSS:  GENERAL  - Condition: In formalin.  - Label: With the patient's name, proper identifying information and   designated \"placenta\".    CORD  - Dimensions: 37 cm long, ranging from 1-1.8 cm in diameter.  - Number of vessels: Three.  - Appearance: Slightly green tinged.  - Presence of true knot(s) or  pseudoknot(s): No.  - Insertion: 4.5 cm from margin.    MEMBRANES  - Condition: Disrupted.  - Color: Green tinged.  - " Transparency: Translucent to partially thickened.  - Insertion: Marginal.  - Other: The membranes are mucoid.    DISK  - Trimmed weight: 509 g.  - Shape: Ovoid.  - Dimensions: 19.5 by 17.5 cm.  - Thickness (min/max): 0.7 to 2.4 cm.  - Fetal Surface: Bluegray to green tinged and mucoid.  - Maternal surface: Intact with well formed cotyledons.  - Findings upon sectioning: Red-brown, spongy with no associated lesion.    CASSETTE SUMMARY:  1 - membrane roll and two sections of umbilical cord.  2-3 - two full thickness sections of placenta. (Dictated by: GISSEL Buckley 5/6/2019 11:30 AM)    MICROSCOPIC:  The membranes show mild meconium staining.  There is patchy mild   nonspecific appearing chronic inflammation of  the maternal decidua.  The placenta shows no evidence of infarction,   villitis, or obvious  vasculopathy/malperfusion features.    The technical component  of this testing was completed at the [facility], with the professional component performed   at the [facility]  [facility], [address], [place]  [address]   ([phone])    CPT Codes:  A: 91089-FI4    COLLECTION SITE:  Client: [facility]  Location: [facility] (R)       Hemoglobin   Result Value Ref Range    Hemoglobin 9.3 (L) 11.7 - 15.7 g/dL   ABO and Rh   Result Value Ref Range    ABO A     RH(D) Pos     Specimen Expires 05/08/2019    ABO/Rh type and screen   Result Value Ref Range    ABO A     RH(D) Pos     Antibody Screen Neg     Test Valid Only At [facility]        Specimen Expires 05/08/2019    Rupture of Fetal Membranes by ROM Plus   Result Value Ref Range    Rupture of Fetal Membranes by ROM Plus Positive (A) NEG^Negative     Reviewed birth experience and answered all questions. Encouraged to continue to process birth story. Patient feels okay with experience but states it was not what she was hoping for.   Return for 6 week  postpartum visit  Reviewed breastfeeding  Reviewed contraception options    Estelita Knowles CNM, WHNP-BC

## 2019-06-12 ENCOUNTER — OFFICE VISIT (OUTPATIENT)
Dept: ENDOCRINOLOGY | Facility: CLINIC | Age: 29
End: 2019-06-12
Payer: COMMERCIAL

## 2019-06-12 VITALS
WEIGHT: 124.6 LBS | SYSTOLIC BLOOD PRESSURE: 99 MMHG | BODY MASS INDEX: 23.54 KG/M2 | TEMPERATURE: 98.2 F | RESPIRATION RATE: 12 BRPM | DIASTOLIC BLOOD PRESSURE: 63 MMHG | HEART RATE: 98 BPM

## 2019-06-12 DIAGNOSIS — E06.3 HYPOTHYROIDISM DUE TO HASHIMOTO'S THYROIDITIS: Primary | ICD-10-CM

## 2019-06-12 PROCEDURE — 99213 OFFICE O/P EST LOW 20 MIN: CPT | Performed by: CLINICAL NURSE SPECIALIST

## 2019-06-12 PROCEDURE — 84443 ASSAY THYROID STIM HORMONE: CPT | Performed by: CLINICAL NURSE SPECIALIST

## 2019-06-12 PROCEDURE — 36415 COLL VENOUS BLD VENIPUNCTURE: CPT | Performed by: CLINICAL NURSE SPECIALIST

## 2019-06-12 PROCEDURE — 84439 ASSAY OF FREE THYROXINE: CPT | Performed by: CLINICAL NURSE SPECIALIST

## 2019-06-12 NOTE — LETTER
"    2019         RE: Yary Muñoz  [address] [address]        Dear Colleague,    Thank you for referring your patient, Yary Muñoz, to the [facility]. Please see a copy of my visit note below.    Name: Yary Muñoz  Seen at the request of No ref. provider found for (Last seen 2018)  Chief Complaint   Patient presents with     Thyroid Problem     HPI:  Yary Muñoz is a 28 year old female who presents for the evaluation of hypothyroidism.  Hypothyroidism was diagnosed about 5 years ago - was noting fatigue and hair loss at the time.  Hypothyroidism currently treated with levothyroxine 88 mcg/day.  Believes she has been treated with this dose since diagnosis.  Had a baby boy 2019.  Levothyroxine dose was gradually increased 88 mcg/day --> 150 mcg/day during the pregnancy.  She is currently treated with levothyroxine 150 mcg/day.  Here today for follow up .  Reports feeling well - no tachycardia, heat intolerance, tremors, insomnia, or unexplained weight loss.    PMH/PSH:  Past Medical History:   Diagnosis Date     Hypothyroid     irregular period, weight gain, family history     NO ACTIVE PROBLEMS      Past Surgical History:   Procedure Laterality Date      SECTION N/A 2019    Procedure:  SECTION;  Surgeon: Enrique Delgado MD;  Location:  OR     [facility]       Family Hx:  Family History   Problem Relation Age of Onset     Thyroid Disease Mother         hypothyroid     Thyroid Disease Father         hypothyroid     Other Cancer Maternal Grandfather         lung     Other Cancer Paternal Grandfather         skin     Breast Cancer Other         great aunt     Breast Cancer Maternal Aunt 39        early 40\"s     Hypothyroidism Brother      Diabetes Maternal Grandmother 60        type 2     Cerebrovascular Disease Maternal Grandmother 70     Hypertension Maternal Grandmother      Thyroid Disease Maternal Grandmother      " Lipids Maternal Grandmother      Diabetes Maternal Grandfather 60        type 2     Cerebrovascular Disease Maternal Grandfather 50     Hypertension Maternal Grandfather      Lipids Maternal Grandfather      Cancer Maternal Grandfather 69        had wagoners disease and  from lung cancer     Thyroid Disease Paternal Grandmother      Cancer Paternal Grandfather 51         from skin cancer     Thyroid disease:  Yes, mother, father, brother, MGM, PGM       DM2:          Autoimmune: DM1, SLE, RA, Vitiligo     Social Hx:  Social History     Socioeconomic History     Marital status:      Spouse name: Woody     Number of children: 0     Years of education: Not on file     Highest education level: Not on file   Occupational History     Occupation: RN     Comment: [employer]   Social Needs     Financial resource strain: Not on file     Food insecurity:     Worry: Not on file     Inability: Not on file     Transportation needs:     Medical: Not on file     Non-medical: Not on file   Tobacco Use     Smoking status: Never Smoker     Smokeless tobacco: Never Used   Substance and Sexual Activity     Alcohol use: No     Drug use: No     Sexual activity: Yes     Partners: Male   Lifestyle     Physical activity:     Days per week: Not on file     Minutes per session: Not on file     Stress: Not on file   Relationships     Social connections:     Talks on phone: Not on file     Gets together: Not on file     Attends Worship service: Not on file     Active member of club or organization: Not on file     Attends meetings of clubs or organizations: Not on file     Relationship status: Not on file     Intimate partner violence:     Fear of current or ex partner: Not on file     Emotionally abused: Not on file     Physically abused: Not on file     Forced sexual activity: Not on file   Other Topics Concern     Parent/sibling w/ CABG, MI or angioplasty before 65F 55M? Not Asked   Social History Narrative    ** Merged History  Encounter **               MEDICATIONS:  has a current medication list which includes the following prescription(s): ferrous gluconate, levothyroxine, breast pump, omega-3 fatty acids, and prenatal multivitamin w/iron.    ROS   ROS: 10 point ROS neg other than the symptoms noted above in the HPI.    Physical Exam   VS: BP 99/63 (BP Location: Right arm, Patient Position: Chair, Cuff Size: Adult Regular)   Pulse 98   Temp 98.2  F (36.8  C) (Oral)   Resp 12   Wt 56.5 kg (124 lb 9.6 oz)   Breastfeeding? Yes   BMI 23.54 kg/m     GENERAL: AXOX3, NAD, well dressed, answering questions appropriately, appears stated age.  HEENT: no exopthalmous, no proptosis, no lig lag, no retraction  NECK:  Supple, thyroid palpably normal, no adenopathy  CV: RRR, no rubs, gallops, no murmurs  LUNGS: CTAB, no wheezes, rales, or ronchi  NEUROLOGY: CN grossly intact, no tremors  MSK: grossly intact    LABS:  TFTs:  !THYROID Latest Ref Rng & Units 4/15/2019   TSH 0.40 - 4.00 mU/L 1.49   T4 FREE 0.76 - 1.46 ng/dL 1.19     TG/TPO:  ENDO THYROID LABS-UMP Latest Ref Rng & Units 8/31/2018   THYROGLOBULIN ANTIBODY <40 IU/mL <20   THYR PEROXIDASE REMBERTO <35 IU/mL 103 (H)     All pertinent notes, labs, and images personally reviewed by me.     A/P  Ms.Jamee Muñoz is a 28 year old here for the evaluation of hypothyroidism:    1. Hypothyroidism secondary to autoimmune thyroid disease/Hashimoto's.      Differential includes: autoimmune disease (Hashimoto's thyroiditis), treatment for hyperthyroidism, radiation therapy, thyroid surgery, medications, congenital disease, pituitary disorder, pregnancy, and iodine deficiency.  Persons with Hashimoto's thyroiditis have serum antibodies reacting with TG, TPO, and against an unidentified protein present in colloid.     Standard treatment for hypothyroidism involves daily use of the synthetic thyroid hormone levothyroxine (Levothroid, Synthroid, others).  The dosage of thyroxine should normally be that  required to bring the serum TSH level to the low normal range, such as 0.3 - 1 uU/ml. This is typically achieved with 1 ug L-T4/lb body weight/day, ranges from 75 - 125 ug/day in women, and 125 - 200 ug/day in men. Once thyroxine treatment is initiated, it is required indefinitely in most patients.     Symptoms should improve one to two weeks after starting treatment. Treatment with levothyroxine is usually lifelong.  Doseage may need to be adjusted based on body weight, medications, or pregnancy.  To determine the right dosage of levothyroxine initially we will repeat TSH and free T4 after two months.    Excessive amounts of the hormone can cause side effects, such as: Increased appetite, insomnia, heart palpitations, and shakiness.  Patients with CAD will be started on a lower dose.  Levothyroxine causes virtually no side effects when used in the appropriate dose.    Certain medications, supplements and foods can affect the body s ability to absorb levothyroxine. Medications include: Iron supplements, Cholestyramine and Calcium supplements.     Patient was advised that decreased absorption of levothyroxine might occur if taken concomitantly with food or within four hours of taking calcium, iron, soy or aluminum containing antacids. Generic substitution for brand name and vice versa, or substitution of one generic formulation for another may cause abnormal TSH levels on a previously stable dose of thyroid hormone. Pt was advised that regular monitoring of thyroid function, as prescribed by the physician, and adherence to dose prescribed, is important.    Obtain TFT's today and adjust levothyroxine dose if indicated.      Labs ordered today:   No orders of the defined types were placed in this encounter.      More than 50% of the time spent with Ms. Muñoz on counseling / coordinating her care.  Total face to face time was greater than or equal to 15 minutes.      Follow-up:  1 year, prn sooner if needed    Azalea  DANIELA Burk  Endocrinology  [facility]  CC:       Again, thank you for allowing me to participate in the care of your patient.        Sincerely,        CHALINO Alston CNP

## 2019-06-12 NOTE — PROGRESS NOTES
"Name: Yary Muñoz  Seen at the request of No ref. provider found for (Last seen 2018)  Chief Complaint   Patient presents with     Thyroid Problem     HPI:  Yary Muñoz is a 28 year old female who presents for the evaluation of hypothyroidism.  Hypothyroidism was diagnosed about 5 years ago - was noting fatigue and hair loss at the time.  Hypothyroidism currently treated with levothyroxine 88 mcg/day.  Believes she has been treated with this dose since diagnosis.  Had a baby boy 2019.  Levothyroxine dose was gradually increased 88 mcg/day --> 150 mcg/day during the pregnancy.  She is currently treated with levothyroxine 150 mcg/day.  Here today for follow up .  Reports feeling well - no tachycardia, heat intolerance, tremors, insomnia, or unexplained weight loss.    PMH/PSH:  Past Medical History:   Diagnosis Date     Hypothyroid     irregular period, weight gain, family history     NO ACTIVE PROBLEMS      Past Surgical History:   Procedure Laterality Date      SECTION N/A 2019    Procedure:  SECTION;  Surgeon: Enrique Delgado MD;  Location:  OR     [facility]       Family Hx:  Family History   Problem Relation Age of Onset     Thyroid Disease Mother         hypothyroid     Thyroid Disease Father         hypothyroid     Other Cancer Maternal Grandfather         lung     Other Cancer Paternal Grandfather         skin     Breast Cancer Other         great aunt     Breast Cancer Maternal Aunt 39        early 40\"s     Hypothyroidism Brother      Diabetes Maternal Grandmother 60        type 2     Cerebrovascular Disease Maternal Grandmother 70     Hypertension Maternal Grandmother      Thyroid Disease Maternal Grandmother      Lipids Maternal Grandmother      Diabetes Maternal Grandfather 60        type 2     Cerebrovascular Disease Maternal Grandfather 50     Hypertension Maternal Grandfather      Lipids Maternal Grandfather      Cancer Maternal Grandfather 69     "    had wagoners disease and  from lung cancer     Thyroid Disease Paternal Grandmother      Cancer Paternal Grandfather 51         from skin cancer     Thyroid disease:  Yes, mother, father, brother, MGM, PGM       DM2:          Autoimmune: DM1, SLE, RA, Vitiligo     Social Hx:  Social History     Socioeconomic History     Marital status:      Spouse name: Woody     Number of children: 0     Years of education: Not on file     Highest education level: Not on file   Occupational History     Occupation: RN     Comment: [employer]   Social Needs     Financial resource strain: Not on file     Food insecurity:     Worry: Not on file     Inability: Not on file     Transportation needs:     Medical: Not on file     Non-medical: Not on file   Tobacco Use     Smoking status: Never Smoker     Smokeless tobacco: Never Used   Substance and Sexual Activity     Alcohol use: No     Drug use: No     Sexual activity: Yes     Partners: Male   Lifestyle     Physical activity:     Days per week: Not on file     Minutes per session: Not on file     Stress: Not on file   Relationships     Social connections:     Talks on phone: Not on file     Gets together: Not on file     Attends [employer] service: Not on file     Active member of club or organization: Not on file     Attends meetings of clubs or organizations: Not on file     Relationship status: Not on file     Intimate partner violence:     Fear of current or ex partner: Not on file     Emotionally abused: Not on file     Physically abused: Not on file     Forced sexual activity: Not on file   Other Topics Concern     Parent/sibling w/ CABG, MI or angioplasty before 65F 55M? Not Asked   Social History Narrative    ** Merged History Encounter **               MEDICATIONS:  has a current medication list which includes the following prescription(s): ferrous gluconate, levothyroxine, breast pump, omega-3 fatty acids, and prenatal multivitamin w/iron.    ROS   ROS: 10 point ROS  neg other than the symptoms noted above in the HPI.    Physical Exam   VS: BP 99/63 (BP Location: Right arm, Patient Position: Chair, Cuff Size: Adult Regular)   Pulse 98   Temp 98.2  F (36.8  C) (Oral)   Resp 12   Wt 56.5 kg (124 lb 9.6 oz)   Breastfeeding? Yes   BMI 23.54 kg/m    GENERAL: AXOX3, NAD, well dressed, answering questions appropriately, appears stated age.  HEENT: no exopthalmous, no proptosis, no lig lag, no retraction  NECK:  Supple, thyroid palpably normal, no adenopathy  CV: RRR, no rubs, gallops, no murmurs  LUNGS: CTAB, no wheezes, rales, or ronchi  NEUROLOGY: CN grossly intact, no tremors  MSK: grossly intact    LABS:  TFTs:  !THYROID Latest Ref Rng & Units 4/15/2019   TSH 0.40 - 4.00 mU/L 1.49   T4 FREE 0.76 - 1.46 ng/dL 1.19     TG/TPO:  ENDO THYROID LABS-UMP Latest Ref Rng & Units 8/31/2018   THYROGLOBULIN ANTIBODY <40 IU/mL <20   THYR PEROXIDASE REMBERTO <35 IU/mL 103 (H)     All pertinent notes, labs, and images personally reviewed by me.     A/P  Ms.Jamee Muñoz is a 28 year old here for the evaluation of hypothyroidism:    1. Hypothyroidism secondary to autoimmune thyroid disease/Hashimoto's.      Differential includes: autoimmune disease (Hashimoto's thyroiditis), treatment for hyperthyroidism, radiation therapy, thyroid surgery, medications, congenital disease, pituitary disorder, pregnancy, and iodine deficiency.  Persons with Hashimoto's thyroiditis have serum antibodies reacting with TG, TPO, and against an unidentified protein present in colloid.     Standard treatment for hypothyroidism involves daily use of the synthetic thyroid hormone levothyroxine (Levothroid, Synthroid, others).  The dosage of thyroxine should normally be that required to bring the serum TSH level to the low normal range, such as 0.3 - 1 uU/ml. This is typically achieved with 1 ug L-T4/lb body weight/day, ranges from 75 - 125 ug/day in women, and 125 - 200 ug/day in men. Once thyroxine treatment is initiated,  it is required indefinitely in most patients.     Symptoms should improve one to two weeks after starting treatment. Treatment with levothyroxine is usually lifelong.  Doseage may need to be adjusted based on body weight, medications, or pregnancy.  To determine the right dosage of levothyroxine initially we will repeat TSH and free T4 after two months.    Excessive amounts of the hormone can cause side effects, such as: Increased appetite, insomnia, heart palpitations, and shakiness.  Patients with CAD will be started on a lower dose.  Levothyroxine causes virtually no side effects when used in the appropriate dose.    Certain medications, supplements and foods can affect the body s ability to absorb levothyroxine. Medications include: Iron supplements, Cholestyramine and Calcium supplements.     Patient was advised that decreased absorption of levothyroxine might occur if taken concomitantly with food or within four hours of taking calcium, iron, soy or aluminum containing antacids. Generic substitution for brand name and vice versa, or substitution of one generic formulation for another may cause abnormal TSH levels on a previously stable dose of thyroid hormone. Pt was advised that regular monitoring of thyroid function, as prescribed by the physician, and adherence to dose prescribed, is important.    Obtain TFT's today and adjust levothyroxine dose if indicated.      Labs ordered today:   No orders of the defined types were placed in this encounter.      More than 50% of the time spent with Ms. Muñoz on counseling / coordinating her care.  Total face to face time was greater than or equal to 15 minutes.      Follow-up:  1 year, prn sooner if needed    Azalea Burk NP  Endocrinology  [facility]  CC:

## 2019-06-13 LAB
T4 FREE SERPL-MCNC: 1.54 NG/DL (ref 0.76–1.46)
TSH SERPL DL<=0.005 MIU/L-ACNC: 0.02 MU/L (ref 0.4–4)

## 2019-06-14 ENCOUNTER — TELEPHONE (OUTPATIENT)
Dept: ENDOCRINOLOGY | Facility: CLINIC | Age: 29
End: 2019-06-14

## 2019-06-14 DIAGNOSIS — O99.281 HYPOTHYROIDISM AFFECTING PREGNANCY IN FIRST TRIMESTER: ICD-10-CM

## 2019-06-14 DIAGNOSIS — E03.9 HYPOTHYROIDISM AFFECTING PREGNANCY IN FIRST TRIMESTER: ICD-10-CM

## 2019-06-14 RX ORDER — LEVOTHYROXINE SODIUM 125 UG/1
125 TABLET ORAL DAILY
Qty: 30 TABLET | Refills: 1 | Status: SHIPPED | OUTPATIENT
Start: 2019-06-14 | End: 2019-07-22

## 2019-06-14 NOTE — RESULT ENCOUNTER NOTE
Please call  Yary,  Your thyroid levels are too high.  I am decreasing the levothyroxine to 125 mcg/day.  Please make a lab only appointment to recheck levels in about 6-8 weeks.  Azalea Burk NP  Endocrinology

## 2019-06-14 NOTE — TELEPHONE ENCOUNTER
Left message to call back   Notes recorded by Azalea Burk APRN CNP on 6/14/2019 at 5:05 AM CDT  Please call  Yary,  Your thyroid levels are too high.  I am decreasing the levothyroxine to 125 mcg/day.  Please make a lab only appointment to recheck levels in about 6-8 weeks.  Azalea Burk NP  Endocrinology

## 2019-06-15 RX ORDER — LEVOTHYROXINE SODIUM 150 UG/1
150 TABLET ORAL DAILY
Qty: 90 TABLET | Refills: 0
Start: 2019-06-15

## 2019-06-21 ENCOUNTER — PRENATAL OFFICE VISIT (OUTPATIENT)
Dept: OBGYN | Facility: CLINIC | Age: 29
End: 2019-06-21
Payer: COMMERCIAL

## 2019-06-21 VITALS — BODY MASS INDEX: 23.62 KG/M2 | SYSTOLIC BLOOD PRESSURE: 112 MMHG | WEIGHT: 125 LBS | DIASTOLIC BLOOD PRESSURE: 70 MMHG

## 2019-06-21 PROCEDURE — 99207 ZZC POST PARTUM EXAM: CPT | Performed by: ADVANCED PRACTICE MIDWIFE

## 2019-06-21 NOTE — PROGRESS NOTES
Midwife Postpartum 6 Week Visit    Yary Muñoz is a 28 year old here for a postpartum checkup.     Delivery date was 5/6/19. She had a c/s for FTP of a viable boy, weight 8 pounds 4 oz., with no complications      Since delivery, she has been breast feeding.  She has not had any signs of infection, her lochia stopped after  4 weeks.  She has not had other complications.  \    She is voiding and having bowel movements without difficulty.       Contraception was discussed and patient desires condoms.   She  has had intercourse since delivery.   She complains of no perineal discomfort.     Mood is Stable  Patient screened for postpartum depression.     ROS:  CONSTITUTIONAL: NEGATIVE for fever, chills, change in weight  INTEGUMENTARU/SKIN: NEGATIVE for worrisome rashes, moles or lesions  EYES: NEGATIVE for vision changes or irritation  ENT: NEGATIVE for ear, mouth and throat problems  RESP: NEGATIVE for significant cough or SOB  BREAST: NEGATIVE for masses, tenderness or discharge  CV: NEGATIVE for chest pain, palpitations or peripheral edema  GI: NEGATIVE for nausea, abdominal pain, heartburn, or change in bowel habits  : NEGATIVE for unusual urinary or vaginal symptoms. Periods are regular.  MUSCULOSKELETAL: NEGATIVE for significant arthralgias or myalgia  NEURO: NEGATIVE for weakness, dizziness or paresthesias  PSYCHIATRIC: NEGATIVE for changes in mood or affect      Current Outpatient Medications:      ferrous gluconate (FERGON) 324 (38 Fe) MG tablet, Take 1 tablet (324 mg) by mouth daily (with breakfast), Disp: 60 tablet, Rfl: 0     levothyroxine (SYNTHROID/LEVOTHROID) 125 MCG tablet, Take 1 tablet (125 mcg) by mouth daily, Disp: 30 tablet, Rfl: 1     Misc. Devices (BREAST PUMP) MISC, 1 each as needed (as needed for expressing breastmilk), Disp: 1 each, Rfl: 0     Omega-3 Fatty Acids (FISH OIL PO), , Disp: , Rfl:      Prenatal Vit-Fe Fumarate-FA (PRENATAL MULTIVITAMIN PLUS IRON) 27-0.8 MG TABS per tablet, Take 1  tablet by mouth daily, Disp: 100 tablet, Rfl: 3.   OB History    Para Term  AB Living   1 1 1 0 0 1   SAB TAB Ectopic Multiple Live Births   0 0 0 0 1      # Outcome Date GA Lbr Edilson/2nd Weight Sex Delivery Anes PTL Lv   1 Term 19 41w6d 10:00 / 06:59 3.742 kg (8 lb 4 oz) M CS-LTranv EPI N GRACE      Complications: Failure to Progress in Second Stage      Name: YAMILE ROMERO      Apgar1: 6  Apgar5: 9     Last pap:  2016      EXAM:  There were no vitals taken for this visit.  BMI: There is no height or weight on file to calculate BMI.  Exam:  Constitutional: healthy, alert and no distress  Head: Normocephalic. No masses, lesions, tenderness or abnormalities  Neck: Neck supple. No adenopathy. Thyroid symmetric, normal size,, Carotids without bruits.  ENT: ENT exam normal, no neck nodes or sinus tenderness  Cardiovascular: negative, PMI normal. No lifts, heaves, or thrills. RRR. No murmurs, clicks gallops or rub  Respiratory: negative, Percussion normal. Good diaphragmatic excursion. Lungs clear  Breasts: self exam is taught and encouraged. Deferred as patient is lactating  Gastrointestinal: Abdomen soft, non-tender. BS normal. No masses, organomegaly    Musculoskeletal: extremities normal- no gross deformities noted, gait normal and normal muscle tone  Skin: no suspicious lesions or rashes and scar - abdomen from c/s, healing well  Neurologic: Gait normal. Reflexes normal and symmetric. Sensation grossly WNL.  Psychiatric: mentation appears normal and affect normal/bright  Hematologic/Lymphatic/Immunologic: Normal cervical lymph nodes  PELVIC EXAM:  Vulva: No lesions, BUS WNL, no tenderness  Vagina: Moist, pink, discharge normal  well rugated, no lesions  Cervix:smooth, pink, no visible lesions  Uterus: Involuted to normal size, non-tender, no masses palpated  Ovaries: No masses palpated  Pelvic tone: WNL  Rectal exam: deferred      ASSESSMENT:   Normal postpartum exam after c/s for  FTP.    PLAN:  Results for orders placed or performed in visit on 06/12/19   TSH   Result Value Ref Range    TSH 0.02 (L) 0.40 - 4.00 mU/L   T4 FREE   Result Value Ref Range    T4 Free 1.54 (H) 0.76 - 1.46 ng/dL       Return as needed or at time of next expected pap, pelvic, or breast exam.  Teaching: self breast exam, exercise, birth control, preconception and mental health  Family Planning:condoms  Encourage Kegels and abdominal exercise.  Continue a multivitamin/prenatal supplement, especially if breastfeeding.  Pap smear was not obtained today.  Postpartum Hgb was not done today.    GDM:  Fasting and 2hr GCT needed:  No  If yes, remind need for annual fasting BG and nutrition/exercise recommendations.    Vivian Wetzel CNM on 6/21/2019 at 3:31 PM

## 2019-07-16 ENCOUNTER — OFFICE VISIT (OUTPATIENT)
Dept: FAMILY MEDICINE | Facility: CLINIC | Age: 29
End: 2019-07-16
Payer: COMMERCIAL

## 2019-07-16 VITALS — DIASTOLIC BLOOD PRESSURE: 62 MMHG | SYSTOLIC BLOOD PRESSURE: 114 MMHG

## 2019-07-16 DIAGNOSIS — Z12.83 SKIN CANCER SCREENING: Primary | ICD-10-CM

## 2019-07-16 DIAGNOSIS — Z80.8 FAMILY HISTORY OF SKIN CANCER: ICD-10-CM

## 2019-07-16 DIAGNOSIS — D22.9 MULTIPLE BENIGN MELANOCYTIC NEVI: ICD-10-CM

## 2019-07-16 DIAGNOSIS — D22.5: ICD-10-CM

## 2019-07-16 PROCEDURE — 99213 OFFICE O/P EST LOW 20 MIN: CPT | Performed by: FAMILY MEDICINE

## 2019-07-16 NOTE — LETTER
2019         RE: Yary Muñoz  [address] [facility] [address]        Dear Colleague,    Thank you for referring your [place], [place]ary [place]oz[place] [facility] [place] see a copy of my visit note below.    [facility] - PRIMARY CARE SKIN    CC: skin cancer screening (full-body)  SUBJECTIVE:   Yary Muñoz is a(n) 28 year old female who presents to clinic today for a full-body skin exam.    Bothersome lesions noticed by the patient or other skin concerns :  Issue One: She requests evaluation of a couple of moles.    Family Medical History  Skin cancer: YES - paternal grandfather  of melanoma  Eczema Psoriasis Autoimmune   NO NO NO     Occupation: previously a nurse (indoor).    Refer to electronic medical record (EMR) for past medical history and medications.    ROS: 14 point review of systems was negative except the symptoms listed above in the HPI.    This document serves as a record of the services and decisions personally performed and made by Ivonne Menon MD and was created by Raul Matias, a trained medical scribe, based on personal observations and provider statements to the medical scribe.  2019 12:21 PM   Raul Matias    OBJECTIVE:   GENERAL: healthy, alert and no distress.  SKIN: Calvo Skin Type - II.  This patient was examined from the top of the head to the bottom of the feet  including scalp, face, neck, trunk, buttocks, both arms, both legs, both hands, both feet, and all fingers and toes. The dermatoscope was used to help evaluate pigmented lesions.  Skin Pertinent Findings:  Upper extremities: Scattered brown macules of various sizes and shapes most consistent with (benign) melanocytic nevi.    Left lower axilla: 5 mm in size raised dark brown uniform lesion most consistent with compound nevus.    Anterior lower extremities: Scattered brown macules of various sizes and shapes most consistent with (benign) melanocytic nevi.    Back:  "Scattered brown macules of various sizes and shapes most consistent with (benign) melanocytic nevi.    ASSESSMENT:     Encounter Diagnoses   Name Primary?     Skin cancer screening Yes     Family history of skin cancer      Multiple benign melanocytic nevi      Compound nevus of axilla          PLAN:   Patient Instructions   FUTURE APPOINTMENTS  Follow up in 2 year(s) for a full-body skin cancer screening.    SUN PROTECTION INSTRUCTIONS  Sun damage can lead to skin cancer and premature aging of the skin.      The best way to protect from sun damage to your skin is to avoid the sun during peak hours (10 am - 2 pm) even on overcast days.    Never use tanning beds. Tanning beds are associated with much higher risks of skin cancer.    All tanning damages the skin. Aim for ivory skin year round and you will have less trouble with your skin in years to come. There is no merit in getting \"a base tan\" before a warm weather vacation, as any tanning indicates your body's response to sun damage.    Stop smoking. Smokers have higher rates of skin cancer and also have premature skin wrinkling.    Use UPF sun-protective clothing, which while more expensive initially provides longer lasting coverage without having to worry about remembering to re-apply.  1. Wear a wide-brimmed hat and sunglasses.   2. Wear sun-protective clothing.  [employer] and other [employer] make sun protective clothing that are stylish, comfortable and cool.   [employer] and other [employer]oot[employer] make UV arm sleeves suitable for golfing, gardening and other activities.    Sunscreen instructions:  1. Use sunscreens with Zinc Oxide, Titanium Dioxide or Avobenzone to protect from UVA rays.  2. Use SPF 30-50+ to protect from UVB rays.  3. Re-apply every 2 hours even if water resistant.  4. Apply on your face every day even when cloudy and even in the winter. UVA \"aging rays\" penetrate window glass and are just as strong in the winter as in the " summer.    FYI  You should use about 3 tablespoons of sunscreen to protect your whole body. Thus a typical eight ounce bottle of sunscreen should last 4 applications. Remember, that the SPF rating is compromised if you don t apply enough. Most people only apply 1/2 - 1/3 of the amount they need. Also don t forget areas such as your ears, feet, upper back and harder to reach places. Keep in mind that these amounts should be increased for larger body sizes.    Sunscreens with titanium dioxide and/or zinc oxide in the active ingredients are physical blockers as opposed to chemical blockers. Chemical-free sunscreens should not irritate the skin.    Spray-on sunscreens may be used for touch-up application only, not as a base layer. Also, use with caution around small children due to inhalation risk.    SPF means sun protection factor, which is just the degree to which the sunscreen can protect against UVB rays. There is no rating system for UVA rays. SPF is calculated as the time skin will burn when sunscreen is applied vs. skin without sunscreen.    Water resistant sunscreens should be re-applied every 1-2 hours.    Product Recommendations:    Consider use of sunscreen sticks with Zinc Oxide and Titanium Dioxide active ingredients such as Neutrogena Pure&Free Baby Sunscreen Stick.    Good examples include: Blue Lizard, EltaMD, Solbar    Good daily moisturizers with SPF: Vanicream, CeraVe.    For sensitive skin, consider : SkinMedica Essential Defense Mineral Shield Broad Spectrum SPF 35    Men: consider use of Neutrogena Triple Protect Facial Lotion    Avoid retinyl palmitate products.  Avoid combination products that include both sunscreen and insect repellant, as sunscreen should be applied every 2 hours, but insect repellant should not be applied as frequently.    For more information:  https://www.skincancer.org/prevention/sun-protection/sunscreen/sunscreens-safe-and-effective    SKIN CANCER SELF-EXAM  INSTRUCTIONS  Check every month in the mirror or with a household member. Be aware of any changes, especially bleeding or tenderness. Also, make sure to check your nails for color changes after removal of nail polish.    For melanoma, check for:  A - Asymmetry. One half unlike the other half.  B - Border. Irregular, scalloped, ragged, notched, blurred or poorly defined borders.  C - Color. Color variations from one area to another, with shades of tan, brown and/or black present. Sometimes white, red or blue.  D - Diameter. Greater than 6 mm (about the size of a pencil eraser). Any new growth of a mole should be concerning and be evaluated.  E - Evolving. A mole or skin lesion that looks different from the rest or is changing in size, shape or color.    For basal cell carcinoma and squamous cell carcinoma, check for:    Sores, shiny bumps, nodules, scaly lesions, or wart-like growths that are itchy, tender, crusting, scabbing, eroding, oozing or bleeding.    Open sores/wounds or reddish/irritated areas that do not heal within 2-3 weeks.    Scar-like areas that are white, yellow or waxy in color.      Use CeraVe SA on the legs    The patient was counseled about sunscreens and sun avoidance. The patient was counseled to check the skin regularly and report any lesion that is new, changing, itching, scabbing, bleeding or otherwise bothersome. The patient was discharged ambulatory and in stable condition.    Educational brochures given to patient: skin cancer.    TT: 25 minutes.  CT: 15 minutes.    The information in this document, created by the medical scribe for me, accurately reflects the services I personally performed and the decisions made by me. I have reviewed and approved this document for accuracy prior to leaving the patient care area.  July 16, 2019 12:21 PM  Ivonne Menon MD  [facility] [place]    Again, thank you for allowing me to participate in the care of your patient.         Sincerely,        Ivonne Menon MD

## 2019-07-16 NOTE — PROGRESS NOTES
[facility] - PRIMARY CARE SKIN    CC: skin cancer screening (full-body)  SUBJECTIVE:   Yary Muñoz is a(n) 28 year old female who presents to clinic today for a full-body skin exam.    Bothersome lesions noticed by the patient or other skin concerns :  Issue One: She requests evaluation of a couple of moles.    Family Medical History  Skin cancer: YES - paternal grandfather  of melanoma  Eczema Psoriasis Autoimmune   NO NO NO     Occupation: previously a nurse (indoor).    Refer to electronic medical record (EMR) for past medical history and medications.    ROS: 14 point review of systems was negative except the symptoms listed above in the HPI.    This document serves as a record of the services and decisions personally performed and made by Ivonne Menon MD and was created by Raul Matias, a trained medical scribe, based on personal observations and provider statements to the medical scribe.  2019 12:21 PM   Raul Matias    OBJECTIVE:   GENERAL: healthy, alert and no distress.  SKIN: Calvo Skin Type - II.  This patient was examined from the top of the head to the bottom of the feet  including scalp, face, neck, trunk, buttocks, both arms, both legs, both hands, both feet, and all fingers and toes. The dermatoscope was used to help evaluate pigmented lesions.  Skin Pertinent Findings:  Upper extremities: Scattered brown macules of various sizes and shapes most consistent with (benign) melanocytic nevi.    Left lower axilla: 5 mm in size raised dark brown uniform lesion most consistent with compound nevus.    Anterior lower extremities: Scattered brown macules of various sizes and shapes most consistent with (benign) melanocytic nevi.    Back: Scattered brown macules of various sizes and shapes most consistent with (benign) melanocytic nevi.    ASSESSMENT:     Encounter Diagnoses   Name Primary?     Skin cancer screening Yes     Family history of skin cancer      Multiple benign melanocytic  "nevi      Compound nevus of axilla          PLAN:   Patient Instructions   FUTURE APPOINTMENTS  Follow up in 2 year(s) for a full-body skin cancer screening.    SUN PROTECTION INSTRUCTIONS  Sun damage can lead to skin cancer and premature aging of the skin.      The best way to protect from sun damage to your skin is to avoid the sun during peak hours (10 am - 2 pm) even on overcast days.    Never use tanning beds. Tanning beds are associated with much higher risks of skin cancer.    All tanning damages the skin. Aim for ivory skin year round and you will have less trouble with your skin in years to come. There is no merit in getting \"a base tan\" before a warm weather vacation, as any tanning indicates your body's response to sun damage.    Stop smoking. Smokers have higher rates of skin cancer and also have premature skin wrinkling.    Use UPF sun-protective clothing, which while more expensive initially provides longer lasting coverage without having to worry about remembering to re-apply.  1. Wear a wide-brimmed hat and sunglasses.   2. Wear sun-protective clothing.  [employer] and other [employer] make sun protective clothing that are stylish, comfortable and cool.   [employer] and other [employer] make UV arm sleeves suitable for golfing, gardening and other activities.    Sunscreen instructions:  1. Use sunscreens with Zinc Oxide, Titanium Dioxide or Avobenzone to protect from UVA rays.  2. Use SPF 30-50+ to protect from UVB rays.  3. Re-apply every 2 hours even if water resistant.  4. Apply on your face every day even when cloudy and even in the winter. UVA \"aging rays\" penetrate window glass and are just as strong in the winter as in the summer.    FYI  You should use about 3 tablespoons of sunscreen to protect your whole body. Thus a typical eight ounce bottle of sunscreen should last 4 applications. Remember, that the SPF rating is compromised if you don t apply enough. Most people only apply 1/2 " - 1/3 of the amount they need. Also don t forget areas such as your ears, feet, upper back and harder to reach places. Keep in mind that these amounts should be increased for larger body sizes.    Sunscreens with titanium dioxide and/or zinc oxide in the active ingredients are physical blockers as opposed to chemical blockers. Chemical-free sunscreens should not irritate the skin.    Spray-on sunscreens may be used for touch-up application only, not as a base layer. Also, use with caution around small children due to inhalation risk.    SPF means sun protection factor, which is just the degree to which the sunscreen can protect against UVB rays. There is no rating system for UVA rays. SPF is calculated as the time skin will burn when sunscreen is applied vs. skin without sunscreen.    Water resistant sunscreens should be re-applied every 1-2 hours.    Product Recommendations:    Consider use of sunscreen sticks with Zinc Oxide and Titanium Dioxide active ingredients such as Neutrogena Pure&Free Baby Sunscreen Stick.    Good examples include: Blue Lizard, EltaMD, Solbar    Good daily moisturizers with SPF: Vanicream, CeraVe.    For sensitive skin, consider : SkinMedica Essential Defense Mineral Shield Broad Spectrum SPF 35    Men: consider use of Neutrogena Triple Protect Facial Lotion    Avoid retinyl palmitate products.  Avoid combination products that include both sunscreen and insect repellant, as sunscreen should be applied every 2 hours, but insect repellant should not be applied as frequently.    For more information:  https://www.skincancer.org/prevention/sun-protection/sunscreen/sunscreens-safe-and-effective    SKIN CANCER SELF-EXAM INSTRUCTIONS  Check every month in the mirror or with a household member. Be aware of any changes, especially bleeding or tenderness. Also, make sure to check your nails for color changes after removal of nail polish.    For melanoma, check for:  A - Asymmetry. One half unlike  the other half.  B - Border. Irregular, scalloped, ragged, notched, blurred or poorly defined borders.  C - Color. Color variations from one area to another, with shades of tan, brown and/or black present. Sometimes white, red or blue.  D - Diameter. Greater than 6 mm (about the size of a pencil eraser). Any new growth of a mole should be concerning and be evaluated.  E - Evolving. A mole or skin lesion that looks different from the rest or is changing in size, shape or color.    For basal cell carcinoma and squamous cell carcinoma, check for:    Sores, shiny bumps, nodules, scaly lesions, or wart-like growths that are itchy, tender, crusting, scabbing, eroding, oozing or bleeding.    Open sores/wounds or reddish/irritated areas that do not heal within 2-3 weeks.    Scar-like areas that are white, yellow or waxy in color.      Use CeraVe SA on the legs    The patient was counseled about sunscreens and sun avoidance. The patient was counseled to check the skin regularly and report any lesion that is new, changing, itching, scabbing, bleeding or otherwise bothersome. The patient was discharged ambulatory and in stable condition.    Educational brochures given to patient: skin cancer.    TT: 25 minutes.  CT: 15 minutes.    The information in this document, created by the medical scribe for me, accurately reflects the services I personally performed and the decisions made by me. I have reviewed and approved this document for accuracy prior to leaving the patient care area.  July 16, 2019 12:21 PM  Ivonne Menon MD  [facility] [place]

## 2019-07-16 NOTE — PATIENT INSTRUCTIONS
"FUTURE APPOINTMENTS  Follow up in 2 year(s) for a full-body skin cancer screening.    SUN PROTECTION INSTRUCTIONS  Sun damage can lead to skin cancer and premature aging of the skin.      The best way to protect from sun damage to your skin is to avoid the sun during peak hours (10 am - 2 pm) even on overcast days.    Never use tanning beds. Tanning beds are associated with much higher risks of skin cancer.    All tanning damages the skin. Aim for ivory skin year round and you will have less trouble with your skin in years to come. There is no merit in getting \"a base tan\" before a warm weather vacation, as any tanning indicates your body's response to sun damage.    Stop smoking. Smokers have higher rates of skin cancer and also have premature skin wrinkling.    Use UPF sun-protective clothing, which while more expensive initially provides longer lasting coverage without having to worry about remembering to re-apply.  1. Wear a wide-brimmed hat and sunglasses.   2. Wear sun-protective clothing.  [employer] and other [employer] make sun protective clothing that are stylish, comfortable and cool.   [employer] and other [employer] make UV arm sleeves suitable for golfing, gardening and other activities.    Sunscreen instructions:  1. Use sunscreens with Zinc Oxide, Titanium Dioxide or Avobenzone to protect from UVA rays.  2. Use SPF 30-50+ to protect from UVB rays.  3. Re-apply every 2 hours even if water resistant.  4. Apply on your face every day even when cloudy and even in the winter. UVA \"aging rays\" penetrate window glass and are just as strong in the winter as in the summer.    FYI  You should use about 3 tablespoons of sunscreen to protect your whole body. Thus a typical eight ounce bottle of sunscreen should last 4 applications. Remember, that the SPF rating is compromised if you don t apply enough. Most people only apply 1/2 - 1/3 of the amount they need. Also don t forget areas such as your ears, " feet, upper back and harder to reach places. Keep in mind that these amounts should be increased for larger body sizes.    Sunscreens with titanium dioxide and/or zinc oxide in the active ingredients are physical blockers as opposed to chemical blockers. Chemical-free sunscreens should not irritate the skin.    Spray-on sunscreens may be used for touch-up application only, not as a base layer. Also, use with caution around small children due to inhalation risk.    SPF means sun protection factor, which is just the degree to which the sunscreen can protect against UVB rays. There is no rating system for UVA rays. SPF is calculated as the time skin will burn when sunscreen is applied vs. skin without sunscreen.    Water resistant sunscreens should be re-applied every 1-2 hours.    Product Recommendations:    Consider use of sunscreen sticks with Zinc Oxide and Titanium Dioxide active ingredients such as Neutrogena Pure&Free Baby Sunscreen Stick.    Good examples include: Blue Lizard, EltaMD, Solbar    Good daily moisturizers with SPF: Vanicream, CeraVe.    For sensitive skin, consider : SkinMedica Essential Defense Mineral Shield Broad Spectrum SPF 35    Men: consider use of Neutrogena Triple Protect Facial Lotion    Avoid retinyl palmitate products.  Avoid combination products that include both sunscreen and insect repellant, as sunscreen should be applied every 2 hours, but insect repellant should not be applied as frequently.    For more information:  https://www.skincancer.org/prevention/sun-protection/sunscreen/sunscreens-safe-and-effective    SKIN CANCER SELF-EXAM INSTRUCTIONS  Check every month in the mirror or with a household member. Be aware of any changes, especially bleeding or tenderness. Also, make sure to check your nails for color changes after removal of nail polish.    For melanoma, check for:  A - Asymmetry. One half unlike the other half.  B - Border. Irregular, scalloped, ragged, notched, blurred  or poorly defined borders.  C - Color. Color variations from one area to another, with shades of tan, brown and/or black present. Sometimes white, red or blue.  D - Diameter. Greater than 6 mm (about the size of a pencil eraser). Any new growth of a mole should be concerning and be evaluated.  E - Evolving. A mole or skin lesion that looks different from the rest or is changing in size, shape or color.    For basal cell carcinoma and squamous cell carcinoma, check for:    Sores, shiny bumps, nodules, scaly lesions, or wart-like growths that are itchy, tender, crusting, scabbing, eroding, oozing or bleeding.    Open sores/wounds or reddish/irritated areas that do not heal within 2-3 weeks.    Scar-like areas that are white, yellow or waxy in color.      Use CeraVe SA on the legs

## 2019-07-17 DIAGNOSIS — E06.3 HYPOTHYROIDISM DUE TO HASHIMOTO'S THYROIDITIS: ICD-10-CM

## 2019-07-17 PROCEDURE — 84439 ASSAY OF FREE THYROXINE: CPT | Performed by: CLINICAL NURSE SPECIALIST

## 2019-07-17 PROCEDURE — 84443 ASSAY THYROID STIM HORMONE: CPT | Performed by: CLINICAL NURSE SPECIALIST

## 2019-07-17 PROCEDURE — 36415 COLL VENOUS BLD VENIPUNCTURE: CPT | Performed by: CLINICAL NURSE SPECIALIST

## 2019-07-18 LAB
T4 FREE SERPL-MCNC: 1.89 NG/DL (ref 0.76–1.46)
TSH SERPL DL<=0.005 MIU/L-ACNC: 0.04 MU/L (ref 0.4–4)

## 2019-07-22 ENCOUNTER — TELEPHONE (OUTPATIENT)
Dept: ENDOCRINOLOGY | Facility: CLINIC | Age: 29
End: 2019-07-22

## 2019-07-22 DIAGNOSIS — E06.3 HYPOTHYROIDISM DUE TO HASHIMOTO'S THYROIDITIS: Primary | ICD-10-CM

## 2019-07-22 RX ORDER — LEVOTHYROXINE SODIUM 88 UG/1
88 TABLET ORAL DAILY
Qty: 90 TABLET | Refills: 0 | Status: SHIPPED | OUTPATIENT
Start: 2019-07-22 | End: 2019-09-08 | Stop reason: DRUGHIGH

## 2019-07-22 NOTE — TELEPHONE ENCOUNTER
Mailbox is full. Unable to leave message. Will try later.   Notes recorded by Azalea Burk APRN CNP on 7/22/2019 at 9:14 AM CDT  Please call  Yary,  Your thyroid levels are even higher than before.  Let's go back to your prepregnant dose of levothyroxine 88 mcg/day.  I'll send a new prescription to your pharmacy.  I'd like you to make another lab appointment to recheck levels in another 6 weeks.  Let me know if you have questions.  Azalea Burk NP  Endocrinology  Steve Joe RN

## 2019-07-22 NOTE — RESULT ENCOUNTER NOTE
Please call  Yary,  Your thyroid levels are even higher than before.  Let's go back to your prepregnant dose of levothyroxine 88 mcg/day.  I'll send a new prescription to your pharmacy.  I'd like you to make another lab appointment to recheck levels in another 6 weeks.  Let me know if you have questions.  Azalea Burk NP  Endocrinology

## 2019-07-22 NOTE — LETTER
July 23, 2019          Yary Toni  [address] [facility] [id number]        Yary,     We have been unable to reach you by telephone because your voicemail is full.     Your thyroid levels are even higher than before. Let's go back to your prepregnant dose of levothyroxine 88 mcg/day. I'll send a new prescription to your pharmacy.    I'd like you to make another lab appointment to recheck levels in another 6 weeks.  Let me know if you have questions.    Azalea Burk NP  Endocrinology

## 2019-09-04 DIAGNOSIS — E06.3 HYPOTHYROIDISM DUE TO HASHIMOTO'S THYROIDITIS: ICD-10-CM

## 2019-09-04 PROCEDURE — 84443 ASSAY THYROID STIM HORMONE: CPT | Performed by: CLINICAL NURSE SPECIALIST

## 2019-09-04 PROCEDURE — 36415 COLL VENOUS BLD VENIPUNCTURE: CPT | Performed by: CLINICAL NURSE SPECIALIST

## 2019-09-04 PROCEDURE — 84439 ASSAY OF FREE THYROXINE: CPT | Performed by: CLINICAL NURSE SPECIALIST

## 2019-09-05 LAB
T4 FREE SERPL-MCNC: 1.08 NG/DL (ref 0.76–1.46)
TSH SERPL DL<=0.005 MIU/L-ACNC: 4.4 MU/L (ref 0.4–4)

## 2019-09-08 RX ORDER — LEVOTHYROXINE SODIUM 100 UG/1
100 TABLET ORAL DAILY
Qty: 90 TABLET | Refills: 1 | Status: SHIPPED | OUTPATIENT
Start: 2019-09-08 | End: 2019-11-08

## 2019-09-08 NOTE — RESULT ENCOUNTER NOTE
Yary,  Your TSH is a little elevated, T4 is normal.  Let's try increasing your levothyroxine dose to 100 mcg/day.  Please make a lab appointment in another 6-8 weeks.  Hopefully, this dose change will put your levels back in normal range.  Let me know if you have questions.  Azalea Burk NP  Endocrinology

## 2019-09-30 ENCOUNTER — HEALTH MAINTENANCE LETTER (OUTPATIENT)
Age: 29
End: 2019-09-30

## 2019-10-20 DIAGNOSIS — E06.3 HYPOTHYROIDISM DUE TO HASHIMOTO'S THYROIDITIS: ICD-10-CM

## 2019-10-21 RX ORDER — LEVOTHYROXINE SODIUM 88 UG/1
88 TABLET ORAL DAILY
Qty: 90 TABLET | Refills: 0 | OUTPATIENT
Start: 2019-10-21

## 2019-10-21 NOTE — TELEPHONE ENCOUNTER
"Requested Prescriptions   Pending Prescriptions Disp Refills     levothyroxine (SYNTHROID/LEVOTHROID) 88 MCG tablet [Pharmacy Med Name: LEVOTHYROXINE 88 MCG TABLET]  Last Written Prescription Date:  9/8/2019  Last Fill Quantity: 90 tablet,  # refills: 1   Last office visit: 6/12/2019 with prescribing provider:  Shaniqua   Future Office Visit:     90 tablet 0     Sig: TAKE 1 TABLET (88 MCG) BY MOUTH DAILY       Thyroid Protocol Failed - 10/20/2019  8:58 AM        Failed - Normal TSH on file in past 12 months     Recent Labs   Lab Test 09/04/19  1400   TSH 4.40*              Passed - Patient is 12 years or older        Passed - Recent (12 mo) or future (30 days) visit within the authorizing provider's specialty     Patient has had an office visit with the authorizing provider or a provider within the authorizing providers department within the previous 12 mos or has a future within next 30 days. See \"Patient Info\" tab in inbasket, or \"Choose Columns\" in Meds & Orders section of the refill encounter.              Passed - Medication is active on med list        Passed - No active pregnancy on record     If patient is pregnant or has had a positive pregnancy test, please check TSH.          Passed - No positive pregnancy test in past 12 months     If patient is pregnant or has had a positive pregnancy test, please check TSH.            "

## 2019-10-21 NOTE — TELEPHONE ENCOUNTER
Denied-  Written by Azalea Burk APRN CNP on 9/8/2019  1:07 PM   Yary,   Your TSH is a little elevated, T4 is normal.   Let's try increasing your levothyroxine dose to 100 mcg/day.   Please make a lab appointment in another 6-8 weeks.   Hopefully, this dose change will put your levels back in normal range.   Let me know if you have questions.   Azalea Burk NP   Endocrinology

## 2019-11-06 DIAGNOSIS — E06.3 HYPOTHYROIDISM DUE TO HASHIMOTO'S THYROIDITIS: ICD-10-CM

## 2019-11-06 PROCEDURE — 36415 COLL VENOUS BLD VENIPUNCTURE: CPT | Performed by: CLINICAL NURSE SPECIALIST

## 2019-11-06 PROCEDURE — 84443 ASSAY THYROID STIM HORMONE: CPT | Performed by: CLINICAL NURSE SPECIALIST

## 2019-11-07 LAB — TSH SERPL DL<=0.005 MIU/L-ACNC: 2.52 MU/L (ref 0.4–4)

## 2019-11-08 RX ORDER — LEVOTHYROXINE SODIUM 100 UG/1
100 TABLET ORAL DAILY
Qty: 90 TABLET | Refills: 3 | Status: SHIPPED | OUTPATIENT
Start: 2019-11-08 | End: 2020-04-24

## 2019-11-08 NOTE — RESULT ENCOUNTER NOTE
Yary,  Your TSH is finally in normal range!  Looks like the 100 mcg/day is a good dose.    Please continue the levothyroxine 100 mcg/day.  I'll renew your prescription.  No need to follow up again for 1 year.  Let me know if you have questions.  Azalea Burk NP  Endocrinology

## 2020-03-22 ENCOUNTER — HEALTH MAINTENANCE LETTER (OUTPATIENT)
Age: 30
End: 2020-03-22

## 2020-04-03 ENCOUNTER — MYC MEDICAL ADVICE (OUTPATIENT)
Dept: ENDOCRINOLOGY | Facility: CLINIC | Age: 30
End: 2020-04-03

## 2020-04-03 DIAGNOSIS — E06.3 HYPOTHYROIDISM DUE TO HASHIMOTO'S THYROIDITIS: Primary | ICD-10-CM

## 2020-04-03 DIAGNOSIS — E03.9 HYPOTHYROIDISM DURING PREGNANCY IN FIRST TRIMESTER: ICD-10-CM

## 2020-04-03 DIAGNOSIS — O99.281 HYPOTHYROIDISM DURING PREGNANCY IN FIRST TRIMESTER: ICD-10-CM

## 2020-04-07 NOTE — TELEPHONE ENCOUNTER
Recommend patient schedule a lab only appointment so we can get baseline TFT's due to pregnancy.  Azalea Burk NP  Endocrinology

## 2020-04-09 ENCOUNTER — PRENATAL OFFICE VISIT (OUTPATIENT)
Dept: OBGYN | Facility: CLINIC | Age: 30
End: 2020-04-09
Payer: COMMERCIAL

## 2020-04-09 VITALS — SYSTOLIC BLOOD PRESSURE: 106 MMHG | WEIGHT: 117 LBS | DIASTOLIC BLOOD PRESSURE: 60 MMHG | BODY MASS INDEX: 22.11 KG/M2

## 2020-04-09 DIAGNOSIS — Z34.81 ENCOUNTER FOR SUPERVISION OF OTHER NORMAL PREGNANCY IN FIRST TRIMESTER: Primary | ICD-10-CM

## 2020-04-09 DIAGNOSIS — E03.9 HYPOTHYROIDISM, UNSPECIFIED TYPE: ICD-10-CM

## 2020-04-09 LAB — HCG, QUAL URINE: ABNORMAL

## 2020-04-09 PROCEDURE — 99207 ZZC FIRST OB VISIT: CPT | Performed by: ADVANCED PRACTICE MIDWIFE

## 2020-04-09 PROCEDURE — 87591 N.GONORRHOEAE DNA AMP PROB: CPT | Performed by: ADVANCED PRACTICE MIDWIFE

## 2020-04-09 PROCEDURE — 87086 URINE CULTURE/COLONY COUNT: CPT | Performed by: ADVANCED PRACTICE MIDWIFE

## 2020-04-09 PROCEDURE — G0145 SCR C/V CYTO,THINLAYER,RESCR: HCPCS | Performed by: ADVANCED PRACTICE MIDWIFE

## 2020-04-09 PROCEDURE — 87491 CHLMYD TRACH DNA AMP PROBE: CPT | Performed by: ADVANCED PRACTICE MIDWIFE

## 2020-04-09 PROCEDURE — 84703 CHORIONIC GONADOTROPIN ASSAY: CPT | Performed by: ADVANCED PRACTICE MIDWIFE

## 2020-04-09 NOTE — LETTER
April 13, 2020      Yary Caalk  [address] [address]    Dear ,      I am happy to inform you that your recent cervical cancer screening test (PAP smear) was normal.      Preventative screenings such as this help to ensure your health for years to come. You should repeat a pap smear in 3 years, unless otherwise directed.      You will still need to return to the clinic every year for your annual exam and other preventive tests.     If you have additional questions regarding this result, please call our registered nurse, Rubi at [phone].      Sincerely,      RAFAEL SULLIVAN CNM/jessica

## 2020-04-09 NOTE — LETTER
April 13, 2020      Rebekah Romero  [address] [address]        Dear ,    We are writing to inform you of your test results.    {results letter list:[id number]}    Resulted Orders   Pap imaged thin layer screen reflex to HPV if ASCUS - recommend age 25 - 29   Result Value Ref Range    PAP NIL     Copath Report         Patient Name: REBEKAH ROMERO  MR#: [id number]  Specimen #: [id number]  Collected: 4/9/2020  Received: 4/10/2020  Reported: 4/13/2020 09:36  Ordering Phy(s): RAFAEL SULLIVAN    For improved result formatting, select 'View Enhanced Report Format' under   Linked Documents section.    SPECIMEN/STAIN PROCESS:  Pap imaged thin layer prep screening (Surepath, FocalPoint with guided   screening)       Pap-Cyto x 1, Pap with reflex to HPV if ASCUS x 1    SOURCE: Cervical, endocervical  ----------------------------------------------------------------   Pap imaged thin layer prep screening (Surepath, FocalPoint with guided   screening)  SPECIMEN ADEQUACY:  Satisfactory for evaluation.  -Transformation zone component present.    CYTOLOGIC INTERPRETATION:    Negative for intraepithelial lesion or malignancy    Electronically signed out by:  DONTA Cuellar (ASCP)    CLINICAL HISTORY:  LMP: 2/10/2020  Pregnant,    Papanicolaou Test Limitations:  Cervical cytology is a screening test with   limit ed sensitivity; regular  screening is critical for cancer prevention; Pap tests are primarily   effective for the diagnosis/prevention of  squamous cell carcinoma, not adenocarcinomas or other cancers.    COLLECTION SITE:  Client:  [facility]  Location: [facility] (LIN[place])    The technical component of this testing was completed at the [facility], with the professional component performed   at the [facility], [address] [place] [address],   [place] [address] ([phone])           If you  have any questions or concerns, please call the clinic at the number listed above.       Sincerely,        RAFAEL SULLIVAN CNM

## 2020-04-09 NOTE — PATIENT INSTRUCTIONS
Thank you for coming to see the Midwives at the   [facility]      We will notify you about your labs that were drawn today once we get the results back or if you have [employer] book they will be posted there as well      We will call you personally with results that require further discussion      If any referrals were ordered today you should be getting a call in the next week or you may need to call the number listed with your referral to schedule.            If you need any refills of medications please call your pharmacy and they will contact us      If you have any questions or concerns before your next visit, you can reach the nurse midwife on call by calling our pager number [phone].      If you  wish to schedule another appointment, please call our office at [phone]. You can also make appointments through [employer] book        If you have a medical emergency please call [phone].    Because you are pregnant, we have additional resources for you:      You may call our consulting RN's during normal business hours for non-urgent questions about your pregnancy.      After hours you may also page the midwife on call for urgent questions or issues at [phone].  There is always a midwife on call 24 hours a day.    Prenatal Reminders:    Before 14 weeks: Dating ultrasound, Genetic testing       This ultrasound helps us determine your dates accurately. Verifi can be drawn anytime after 10 weeks of gestation  16 weeks: Optional genetic testing (quad screen) or single AFP       This testing helps understand your baby's risk for some genetic abnormalities.  20 weeks:  Screening ultrasound (fetal survey)       This testing will look for early growth abnormalities, and may tell the baby's sex if you wish to find out.  28 weeks: One hour sugar test (GCT), hemoglobin and platelets       This test helps identify diabetes of pregnancy or gestational diabetes.  We also look      at the iron in your blood and how well your  blood clots.  28 - 36 weeks: Tetanus shot (Tdap)       This shot helps protect you and your baby from tetanus and whooping cough.  36 weeks and later: Group B Strep test (GBS)       This test helps predict if you need antibiotics in labor to prevent infection in your baby.  Anytime September to April:  Flu shot       This shot helps protect you and your family from the flu.  This is especially important during pregnancy        Any time during or after your pregnancy you may experience increased depression and/or mood changes.    We are here to support you. Please contact us if you are:    Feeling anxious    Overwhelmed or sad     Trouble sleeping    Crying uncontrollably    Trouble caring for yourself or baby.    The typical schedule after your first visit today you can expect:     Visit 2 - 12-16 weeks  Visit 3 - 20 weeks  Visit 4 - 24 weeks  Visit 5 - 28 weeks  Visit 6 - 32 weeks  Visit 7 - 34 weeks  Visit 8 - 36 weeks  Weekly after 36 weeks until delivery.    If anything comes up between your visits or you have concerns please don't hesitate to contact us.    Secure access to your medical record:  Use [employer] (secure email communication and access to your chart) to send your primary care provider a message or make an appointment. Ask someone on your Team how to sign up for [employer]. To log on to [employer] or for more information in [employer] please visit the website at [email or web address].       Certified Nurse Midwife (CNM) Team  CHALINO Kuo, CHALINO Shin, CHALINO Soto, CHALINO Krishna, CHALINO Read, JERICA    Again, thank you for choosing the midwives at [facility].  We are excited to be a part of your pregnancy. Please let us know how we can best partner with you to improve your and your family's health.    Genetic Screening in Pregnancy    There are several options you have for genetic screening in your pregnancy.  Everyone has their own personal reasons  "to screen or not to screen.  We want you to make the best choice for you and your pregnancy.  Below is a list of options, what they screen for and when the screening is done.  Genetic screening is recommended for women who are 35 and older at delivery and for those with a family history of chromosomal abnormalities. However, screening is offered to all women.    Innatal:      This is a screening for the more common chromosome abnormalities, including trisomy 21 (Down's syndrome), trisomy 18, trisomy 13 and sex chromosome abnormalities. This is a prenatal test that can be done as early as 10 weeks gestation.       A maternal blood sample is drawn that sequences cell-free DNA in maternal plasma. This in turn allows for molecular counting of chromosome copy numbers with a >99% detection rate of Down syndrome, a 97% detection rate of Trisomy 18, and a 78% detection rate of Trisomy 13.    This test will give information about the gender of the baby.  You can choose to not have that disclosed.       Results come back within 10-14 days.     About 5% of samples will have results that are designated as \"indeterminate\" or \"uninterruptable.\" With this type of result, genetic counseling and diagnostic testing are recommended. Remember this is a screening test and does not definitively diagnose or exclude the presence of these chromosome conditions.     This screening may or may not be covered by insurance.  We recommend you consult your insurance company to discuss.  Financial assistance is available at a low cost if not covered by your insurance.         Hemoglobin Electrophoresis:  Hemoglobin electrophoresis is a non-invasive blood test that looks at abnormal hemoglobin (component of red blood cells) production. Examples of this include sickle cell anemia (which is a disorder of the red blood cells and their shape) and the thalassemia s (which is also a form of anemia).  High risk groups include:  , Southeast Asians, " , Mediterranean, Israeli, South and Central American and [place] descent. This blood test is usually done with your first OB labs.  This is a one-time test.      Trio Carrier Screen:   1.  Cystic Fibrosis (CF) is the most common life-shortening autosomal  recessive disease among  populations, with a frequency of 1 in  Every 3,500 live births. Although it mainly affects the   Population anyone can request screening. If you have a family history of  cystic fibrosis you should request this test.  This screening is a blood draw      2.  Spinal Muscular Atrophy (SMA) is the most common inherited cause  of infant death.  The most common form of this disorder causes death by a age two.  One in every 6,000 to 10,000 babies born in the  has SMA      3.  Fragile X Syndrome (FXS) is the most common inherited cause of  intellectual disability.  Approximately 1 in every 3,600 boys and 1 in every  6,000 girls is born with FXS      **If you are a carrier of CF or SMA, your partner will also need to be tested. This partner testing is offered free of charge.  This screen can be done prior to pregnancy or at any time during the pregnancy.      Quad Screen:    The quad screen is a quadruple marker screening test done during the second trimester of your pregnancy. This prenatal screening test that measures levels of four substances in a pregnant woman's blood; Alpha-fetoprotein (AFP), Human chorionic gonadotropin (HCG), Estriol, and Inhibin A.     AFP screens for open neural tube defects like Spina Bifida and Anencephaly.     Spina bifida is a serious birth defect that occurs when a portion of the neural tube fails to develop or close properly, causing defects in the spinal cord and in the bones of the spine.     Anencephaly is a serious birth defect in the closure of the neural tube, resulting in an underdeveloped brain and an incomplete skull.     Human chorionic gonadotropin (HCG), Estriol, and  Inhibin screen for Down syndrome (trisomy 21) and Trisomy 18.     Down syndrome is a chromosomal disorder that causes lifelong intellectual disability and developmental delays and, in some people, health problems    Trisomy 18 is a chromosomal disorder that causes severe developmental delays and anatomic abnormalities. It is often fatal by age 1.    The screening is ideally done between 15 and 18 weeks of pregnancy but can be done up to week 20. Even if you have done the Verifi testing you can still get a single AFP drawn to check for neural tube defects.       Screening Ultrasound:    This is a fetal survey done around 20 weeks gestation.  This screen will look at the cardiac activity, fetal heart rate and rhythm, assessment of the amniotic fluid volume, placenta appearance and location, and the umbilical cord vessel number and placental insertion site.  They also do many fetal measurements to make sure the baby is growing properly.  The cervical length is also measured and fetal movement is evaluated.  The sex of the baby can usually be determined.      In the event of a positive screen:    There are two diagnostic tests available if any screening tests come back with an increased risk.  You would first be referred to Maternal Fetal Medicine to be seen by a genetic counselor.  They would assess your risk and see if further diagnostic testing is warranted.  The options are; chorionic villus sampling (CVS), usually done at 11 to 12 weeks of pregnancy and amniocentesis which is generally done later in pregnancy around 15 to 20 weeks.  All risks/benefits would be explained and you can decide what course of action is best for you and your family.    Why you might choose to screen?    A desire to know as much as you can about your baby    If your baby had a genetic abnormality you can learn about it before they are born    Choose whether to continue the pregnancy or to terminate    Why you might choose to NOT  screen?    You feel that whatever happens is fine and you would not terminate    You know you don't want to do any diagnostic tests even if the screening test showed a high possibility of a genetic abnormality        For further information please call:  Sergey Min  [phone]

## 2020-04-09 NOTE — NURSING NOTE
"Chief Complaint   Patient presents with     Prenatal Care     NPN 8w 3d no concerns       Initial /60 (BP Location: Right arm, Cuff Size: Adult Regular)   Wt 53.1 kg (117 lb)   LMP 02/10/2020   BMI 22.11 kg/m   Estimated body mass index is 22.11 kg/m  as calculated from the following:    Height as of 19: 1.549 m (5' 1\").    Weight as of this encounter: 53.1 kg (117 lb).  BP completed using cuff size: regular    Questioned patient about current smoking habits.  Pt. has never smoked.          The following HM Due: pap smear  Chalmydia ()    Lynette Corley CMA           "

## 2020-04-09 NOTE — PROGRESS NOTES
Yary Muñoz is a 29 year old   woman,  who is a previous CNM patient. She presents for a new OB Visit. This was a planned pregnancy.     FOB is Woody who is in good health.  FOB IS actively involved in relationship and this pregnancy.     Woody presents today for her first OB visit. Was hoping to become pregnant at some time this year, but occurred little earlier than expected. Had previous  after pushing for 3.5 hours with no fetal descent. Discussed that she may not be an appropriate candidate for  due to history of CPD.    She has not had bleeding since her LMP.    She denies abdominal pain since her LMP.  She has had nausea.  has not had vomiting.  Any personal or family history of blood clots? No  History of sickle cell anemia or trait? No         Patient's last menstrual period was 02/10/2020..  Estimated Date of Delivery: 2020 Ultrasound consistent with LMP.    MENSTRUAL HISTORY    Had first menses since having baby last year.  Last PAP:    History of abnormal Pap?  No    Health maintenance updated:  yes        Current medications are:    Current Outpatient Medications:      levothyroxine (SYNTHROID/LEVOTHROID) 100 MCG tablet, Take 1 tablet (100 mcg) by mouth daily, Disp: 90 tablet, Rfl: 3     Misc. Devices (BREAST PUMP) MISC, 1 each as needed (as needed for expressing breastmilk), Disp: 1 each, Rfl: 0     Omega-3 Fatty Acids (FISH OIL PO), , Disp: , Rfl:      Prenatal Vit-Fe Fumarate-FA (PRENATAL MULTIVITAMIN PLUS IRON) 27-0.8 MG TABS per tablet, Take 1 tablet by mouth daily, Disp: 100 tablet, Rfl: 3     ferrous gluconate (FERGON) 324 (38 Fe) MG tablet, Take 1 tablet (324 mg) by mouth daily (with breakfast) (Patient not taking: Reported on 2020), Disp: 60 tablet, Rfl: 0     INFECTION HISTORY  HIV: No  Hepatitis B: No  Hepatitis C: No  Tuberculosis: No    Genital Herpes self: no  Herpes partner:  no  Chlamydia:  no  Gonorrhea:  no  HPV: No  BV:   No  Syphilis:  No  Chicken Pox:  No      OB HISTORY  OB History    Para Term  AB Living   2 1 1 0 0 1   SAB TAB Ectopic Multiple Live Births   0 0 0 0 1      # Outcome Date GA Lbr Edilson/2nd Weight Sex Delivery Anes PTL Lv   2 Current            1 Term 19 41w6d 10:00 / 06:59 3.742 kg (8 lb 4 oz) M CS-LTranv EPI N GRACE      Complications: Failure to Progress in Second Stage      Name: Chivo      Apgar1: 6  Apgar5: 9       History of GDM: No,  PTL : No,  History of HTN in pregnancy: No,  Thrombocytopenia: No,  Shoulder dystocia: No,  Vacuum Extraction: No  PPH: No   3rd of 4th degree laceration: No.   Other complications: CPD,  after pushing for 3.5 hours with no fetal descent    PERSONAL HISTORY  Exercise Habits:  walking 4-7 days per week.  Employment: Unemployed.    Travel plans:  are none planned.   Diet: eats regular meals and follows a balanced nutrition diet  Prenatal vitamins? Yes:   Fish Oil? Yes:   Abuse concerns? No  Any history if abuse, varbal, physical, sexual? No  Hgb A1c screen:  BMI > 30: No, 1st degree family DM: No, History of GDM: No, PCOS: No, High risk ethnicity: No    Social History     Socioeconomic History     Marital status:      Spouse name: Woody     Number of children: 0     Years of education: Not on file     Highest education level: Not on file   Occupational History     Occupation: RN     Comment: [employer]   Social Needs     Financial resource strain: Not on file     Food insecurity     Worry: Not on file     Inability: Not on file     Transportation needs     Medical: Not on file     Non-medical: Not on file   Tobacco Use     Smoking status: Never Smoker     Smokeless tobacco: Never Used   Substance and Sexual Activity     Alcohol use: No     Drug use: No     Sexual activity: Yes     Partners: Male   Lifestyle     Physical activity     Days per week: Not on file     Minutes per session: Not on file     Stress: Not on file   Relationships     Social  "connections     Talks on phone: Not on file     Gets together: Not on file     Attends Restorationism service: Not on file     Active member of club or organization: Not on file     Attends meetings of clubs or organizations: Not on file     Relationship status: Not on file     Intimate partner violence     Fear of current or ex partner: Not on file     Emotionally abused: Not on file     Physically abused: Not on file     Forced sexual activity: Not on file   Other Topics Concern     Parent/sibling w/ CABG, MI or angioplasty before 65F 55M? Not Asked   Social History Narrative    ** Merged History Encounter **              She  reports that she has never smoked. She has never used smokeless tobacco.    STD testing offered?  Accepted  Last PHQ-9 score on record = No flowsheet data found.  Last GAD7 score on record = No flowsheet data found.  Alcohol Score = 0  Referral/Meds needed? no    PAST MEDICAL/SURGICAL HISTORY  Past Medical History:   Diagnosis Date     Hypothyroid     irregular period, weight gain, family history     NO ACTIVE PROBLEMS      Past Surgical History:   Procedure Laterality Date      SECTION N/A 2019    Procedure:  SECTION;  Surgeon: Enrique Delgado MD;  Location:  OR     [facility]         FAMILY HISTORY  Family History   Problem Relation Age of Onset     Thyroid Disease Mother         hypothyroid     Thyroid Disease Father         hypothyroid     Other Cancer Maternal Grandfather         lung     Other Cancer Paternal Grandfather         skin     Breast Cancer Other         great aunt     Breast Cancer Maternal Aunt 39        early 40\"s     Hypothyroidism Brother      Diabetes Maternal Grandmother 60        type 2     Cerebrovascular Disease Maternal Grandmother 70     Hypertension Maternal Grandmother      Thyroid Disease Maternal Grandmother      Lipids Maternal Grandmother      Diabetes Maternal Grandfather 60        type 2     Cerebrovascular Disease " Maternal Grandfather 50     Hypertension Maternal Grandfather      Lipids Maternal Grandfather      Cancer Maternal Grandfather 69        had wagoners disease and  from lung cancer     Thyroid Disease Paternal Grandmother      Cancer Paternal Grandfather 51         from skin cancer         ROS:  CONSTITUTIONAL: NEGATIVE for fever, chills, change in weight  INTEGUMENTARU/SKIN: NEGATIVE for worrisome rashes, moles or lesions  EYES: NEGATIVE for vision changes or irritation  ENT: NEGATIVE for ear, mouth and throat problems  RESP: NEGATIVE for significant cough or SOB  BREAST: NEGATIVE for masses, tenderness or discharge  CV: NEGATIVE for chest pain, palpitations or peripheral edema  GI: NEGATIVE for nausea, abdominal pain, heartburn, or change in bowel habits  : NEGATIVE for unusual urinary or vaginal symptoms.   MUSCULOSKELETAL: NEGATIVE for significant arthralgias or myalgia  NEURO: NEGATIVE for weakness, dizziness or paresthesias  ENDOCRINE: NEGATIVE for temperature intolerance, skin/hair changes  HEME/ALLERGY/IMMUNE: NEGATIVE for bleeding problems  PSYCHIATRIC: NEGATIVE for changes in mood or affect    PHYSICAL EXAM  Vitals: /60 (BP Location: Right arm, Cuff Size: Adult Regular)   Wt 53.1 kg (117 lb)   LMP 02/10/2020   BMI 22.11 kg/m    BMI= Body mass index is 22.11 kg/m .     GENERAL:  29 year old pleasant pregnant female, alert, cooperative and well groomed.  NECK:  Thyroid without enlargement and nodules.  Lymph nodes not palpable.   LUNGS:  Clear to auscultation.  BREAST:  Deferred as patient currently breastfeeding  HEART:  RRR without murmur.  ABDOMEN: Soft without masses or tenderness.  Well healed scar from  section.  GENITALIA: BUS without tenderness or inflammation.  Perineum without lesions.    VAGINA:  Pink, normal rugae and discharge.  CERVIX:  Mid, smooth, without discharge, and firm/ closed 4 cm long.   UTERUS: Anteverted, nontender 8 weeks in size.  ADNEXA: Without masses  or tenderness  RECTAL:  Normal appearance.  Digital exam deferred.  LOWER EXTREMITIES: No edema. No significant varicosities.    ASSESSMENT/PLAN:    IUP at 8w3d    ICD-10-CM    1. Encounter for supervision of other normal pregnancy in first trimester  Z34.81 ABO/Rh type and screen     Hepatitis B surface antigen     CBC with platelets     HIV Antigen Antibody Combo     Rubella Antibody IgG Quantitative     Treponema Abs w Reflex to RPR and Titer     Urine Culture Aerobic Bacterial     Pap imaged thin layer screen reflex to HPV if ASCUS - recommend age 25 - 29     HCL HCG, URINE, NURSE BACKOFFICE   2. Hypothyroidism, unspecified type  E03.9 TSH with free T4 reflex        consult for US for AMA patients: NA  Genetic Testing reviewed and discussed, patient desires no additional genetic testing. Handout provided    COUNSELING    Instructed on use of triage nurse line and contacting the on call CNM after hours in an emergency.     Symptoms of N&V and fatigue usually start to resolve around 12-16 weeks     Reviewed CNM philosophy, call schedule for labor and delivery, and FR for delivery    1st OB handout given outlining appointment spacing and CNM information    Reviewed exercise and nutrition    Recommend to gain 25-35 pounds with her pregnancy.    Discussed OTC medications. OB med list given    Encouraged patient to arrange  if needed    Encouraged patient to take PNV's/DHA    Travel precautions discussed, no air travel after 36 weeks and Zika Virus discussed    Will call patient with lab results when available    Does patient desire a RN home visit from the [facility]?  No    If yes, paperwork completed?  No       Will return to the clinic in 4 weeks for her next routine prenatal check.  Will call to be seen sooner if problems arise.    CHALINO Kuo, CNM

## 2020-04-10 LAB
BACTERIA SPEC CULT: NO GROWTH
C TRACH DNA SPEC QL NAA+PROBE: NEGATIVE
N GONORRHOEA DNA SPEC QL NAA+PROBE: NEGATIVE
SPECIMEN SOURCE: NORMAL

## 2020-04-13 LAB
COPATH REPORT: NORMAL
PAP: NORMAL

## 2020-04-23 DIAGNOSIS — O99.281 HYPOTHYROIDISM DURING PREGNANCY IN FIRST TRIMESTER: ICD-10-CM

## 2020-04-23 DIAGNOSIS — E06.3 HYPOTHYROIDISM DUE TO HASHIMOTO'S THYROIDITIS: ICD-10-CM

## 2020-04-23 DIAGNOSIS — E03.9 HYPOTHYROIDISM DURING PREGNANCY IN FIRST TRIMESTER: ICD-10-CM

## 2020-04-23 DIAGNOSIS — Z34.81 ENCOUNTER FOR SUPERVISION OF OTHER NORMAL PREGNANCY IN FIRST TRIMESTER: ICD-10-CM

## 2020-04-23 LAB
ABO + RH BLD: NORMAL
ABO + RH BLD: NORMAL
BLD GP AB SCN SERPL QL: NORMAL
BLOOD BANK CMNT PATIENT-IMP: NORMAL
ERYTHROCYTE [DISTWIDTH] IN BLOOD BY AUTOMATED COUNT: 12.4 % (ref 10–15)
HCT VFR BLD AUTO: 40 % (ref 35–47)
HGB BLD-MCNC: 13.4 G/DL (ref 11.7–15.7)
MCH RBC QN AUTO: 29.1 PG (ref 26.5–33)
MCHC RBC AUTO-ENTMCNC: 33.5 G/DL (ref 31.5–36.5)
MCV RBC AUTO: 87 FL (ref 78–100)
PLATELET # BLD AUTO: 239 10E9/L (ref 150–450)
RBC # BLD AUTO: 4.6 10E12/L (ref 3.8–5.2)
SPECIMEN EXP DATE BLD: NORMAL
WBC # BLD AUTO: 6.1 10E9/L (ref 4–11)

## 2020-04-23 PROCEDURE — 86900 BLOOD TYPING SEROLOGIC ABO: CPT | Performed by: ADVANCED PRACTICE MIDWIFE

## 2020-04-23 PROCEDURE — 87389 HIV-1 AG W/HIV-1&-2 AB AG IA: CPT | Performed by: CLINICAL NURSE SPECIALIST

## 2020-04-23 PROCEDURE — 84439 ASSAY OF FREE THYROXINE: CPT | Performed by: CLINICAL NURSE SPECIALIST

## 2020-04-23 PROCEDURE — 36415 COLL VENOUS BLD VENIPUNCTURE: CPT | Performed by: CLINICAL NURSE SPECIALIST

## 2020-04-23 PROCEDURE — 86780 TREPONEMA PALLIDUM: CPT | Performed by: CLINICAL NURSE SPECIALIST

## 2020-04-23 PROCEDURE — 85027 COMPLETE CBC AUTOMATED: CPT | Performed by: CLINICAL NURSE SPECIALIST

## 2020-04-23 PROCEDURE — 86901 BLOOD TYPING SEROLOGIC RH(D): CPT | Performed by: ADVANCED PRACTICE MIDWIFE

## 2020-04-23 PROCEDURE — 86762 RUBELLA ANTIBODY: CPT | Performed by: CLINICAL NURSE SPECIALIST

## 2020-04-23 PROCEDURE — 84443 ASSAY THYROID STIM HORMONE: CPT | Performed by: CLINICAL NURSE SPECIALIST

## 2020-04-23 PROCEDURE — 86850 RBC ANTIBODY SCREEN: CPT | Performed by: ADVANCED PRACTICE MIDWIFE

## 2020-04-23 PROCEDURE — 87340 HEPATITIS B SURFACE AG IA: CPT | Performed by: CLINICAL NURSE SPECIALIST

## 2020-04-24 LAB
HBV SURFACE AG SERPL QL IA: NONREACTIVE
HIV 1+2 AB+HIV1 P24 AG SERPL QL IA: NONREACTIVE
RUBV IGG SERPL IA-ACNC: 86 IU/ML
T PALLIDUM AB SER QL: NONREACTIVE
T4 FREE SERPL-MCNC: 1.23 NG/DL (ref 0.76–1.46)
TSH SERPL DL<=0.005 MIU/L-ACNC: 3.58 MU/L (ref 0.4–4)

## 2020-04-24 RX ORDER — LEVOTHYROXINE SODIUM 112 UG/1
112 TABLET ORAL DAILY
Qty: 30 TABLET | Refills: 1 | Status: SHIPPED | OUTPATIENT
Start: 2020-04-24 | End: 2020-06-16

## 2020-05-07 ENCOUNTER — VIRTUAL VISIT (OUTPATIENT)
Dept: OBGYN | Facility: CLINIC | Age: 30
End: 2020-05-07
Payer: COMMERCIAL

## 2020-05-07 ENCOUNTER — PREP FOR PROCEDURE (OUTPATIENT)
Dept: OBGYN | Facility: CLINIC | Age: 30
End: 2020-05-07

## 2020-05-07 ENCOUNTER — TELEPHONE (OUTPATIENT)
Dept: OBGYN | Facility: CLINIC | Age: 30
End: 2020-05-07

## 2020-05-07 DIAGNOSIS — Z34.91 PRENATAL CARE IN FIRST TRIMESTER: Primary | ICD-10-CM

## 2020-05-07 DIAGNOSIS — Z11.59 ENCOUNTER FOR SCREENING FOR OTHER VIRAL DISEASES: Primary | ICD-10-CM

## 2020-05-07 DIAGNOSIS — Z98.891 HISTORY OF CESAREAN SECTION: Primary | ICD-10-CM

## 2020-05-07 PROCEDURE — 99207 ZZC PRENATAL VISIT: CPT | Performed by: FAMILY MEDICINE

## 2020-05-07 NOTE — PROGRESS NOTES
CC: Here for routine prenatal visit   telephone visit, patient gives verbal consent for telephone visit    29 year old y/o  @ 9 5/7 weeks with Estimated Date of Delivery: Dec 5, 2020     LMP 02/10/2020   See OB flowsheet  + fetal movement, no contractions, no bleeding, no loss of fluid   Discussed monitoring fetal movement     1) concerns: mild nausea, declines   2) Routine: A+ RI, tdap [   ], flu [ ]   3) Risk factors:   A: Hx of CS: desires RCS orders placed 2020   B: Hypothyroidism:  Sees endocrinology, tsh and t4 pending end of May 2020   4) Return: to office in 4 weeks     Time of visit 10 minutes     Liudmila

## 2020-05-07 NOTE — TELEPHONE ENCOUNTER
Type of surgery: repeat  section  Location of surgery: [facility]  Date and time of surgery: 20 @ 7:30 am  Surgeon: Dr. Nur  Pre-Op Appt Date: @ prenatal appointment  Post-Op Appt Date:    Packet sent out: Yes  Pre-cert/Authorization completed:  No  Date: 20

## 2020-05-07 NOTE — TELEPHONE ENCOUNTER
Procedure name(s) - multi select  repeat  section    Reason for procedure  hx of  section    Surgeon:  Liudmila    Is this a multi surgeon case?  No    Laterality  N/A    Request for additional equipment  Other (see comments)    Comment: None    Anesthesia  Spinal    Initiate Pre-op orders for above procedure:  Yes, as ordered in Epic    Comment: Additional orders noted there also    Location of Case:  [facility]    Surgical Assistant  key if possible    Urgency of Surgery:  Routine    Surgeon Procedure Time (incision to closure) in minutes (per procedure as applicable)  40    Note:  Surgical Case Time Needed (in minutes)    Date of Surgery (Requested):  2020    Patient Class (for admit prior to surgery, specify number of days in comments):  Surgery admit    Length of Stay:  3 days    H&P To Be Completed By:  Surgeon    Post-Op Appointment  1.5 week    Vendor Needed?  No

## 2020-05-07 NOTE — PROGRESS NOTES
"Yary Muñoz is a 29 year old female who is being evaluated via a billable telephone visit.      The patient has been notified of following:     \"This telephone visit will be conducted via a call between you and your physician/provider. We have found that certain health care needs can be provided without the need for a physical exam.  This service lets us provide the care you need with a short phone conversation.  If a prescription is necessary we can send it directly to your pharmacy.  If lab work is needed we can place an order for that and you can then stop by our lab to have the test done at a later time.    Telephone visits are billed at different rates depending on your insurance coverage. During this emergency period, for some insurers they may be billed the same as an in-person visit.  Please reach out to your insurance provider with any questions.    If during the course of the call the physician/provider feels a telephone visit is not appropriate, you will not be charged for this service.\"    Patient has given verbal consent for Telephone visit?  Yes    What phone number would you like to be contacted at? [phone]    How would you like to obtain your AVS? MyChart    "

## 2020-06-04 ENCOUNTER — PRENATAL OFFICE VISIT (OUTPATIENT)
Dept: OBGYN | Facility: CLINIC | Age: 30
End: 2020-06-04
Payer: COMMERCIAL

## 2020-06-04 VITALS — DIASTOLIC BLOOD PRESSURE: 64 MMHG | SYSTOLIC BLOOD PRESSURE: 110 MMHG | WEIGHT: 114 LBS | BODY MASS INDEX: 21.54 KG/M2

## 2020-06-04 DIAGNOSIS — E03.9 HYPOTHYROIDISM DURING PREGNANCY IN FIRST TRIMESTER: ICD-10-CM

## 2020-06-04 DIAGNOSIS — Z34.91 PRENATAL CARE IN FIRST TRIMESTER: Primary | ICD-10-CM

## 2020-06-04 DIAGNOSIS — O99.281 HYPOTHYROIDISM DURING PREGNANCY IN FIRST TRIMESTER: ICD-10-CM

## 2020-06-04 PROCEDURE — 84439 ASSAY OF FREE THYROXINE: CPT | Performed by: CLINICAL NURSE SPECIALIST

## 2020-06-04 PROCEDURE — 99207 ZZC PRENATAL VISIT: CPT | Performed by: FAMILY MEDICINE

## 2020-06-04 PROCEDURE — 36415 COLL VENOUS BLD VENIPUNCTURE: CPT | Performed by: CLINICAL NURSE SPECIALIST

## 2020-06-04 PROCEDURE — 84443 ASSAY THYROID STIM HORMONE: CPT | Performed by: CLINICAL NURSE SPECIALIST

## 2020-06-04 NOTE — PATIENT INSTRUCTIONS
"Return 4 weeks   Return to clinic:  every 4 weeks till 28 weeks, then every 2 weeks till 36 weeks, then weekly till delivery      Phone numbers [place]:  Day/ night [phone] ask for ob triage  Emergency:  Call labor and delivery:  [phone]    What should I call about??    Contraction every 5 minutes for 1 hour 1 minute long (511), bleeding, loss of fluid, headache that doesn't resolve with tylenol, and decreased fetal movement     Start kick counts @ 26-28 weeks   There is an carlito for this!  It is called \"count the kicks\"  Keep track of movement and discover your normal baby movement pattern   guideline is listed below  Please call if you do not feel the baby move!  We will have you come in for fetal heart rate monitoring:   Perception of at least 10 FMs during 12 hours of normal maternal activity   Perception of least 10 FMs over two hours when the mother is at rest and focused on [facility] Address   [address], [place], [place] [address] [phone]    Dr. Neris Nur, DO    OB/GYN   [facility] and [facility]                                                      "

## 2020-06-04 NOTE — PROGRESS NOTES
CC: Here for routine prenatal visit   29 year old y/o  @ 13w5d with Estimated Date of Delivery: Dec 5, 2020     /64   Wt 51.7 kg (114 lb)   LMP 02/10/2020   BMI 21.54 kg/m    See OB flowsheet  + fetal movement, no contractions, no bleeding, no loss of fluid   Discussed monitoring fetal movement     1) concerns:   Covid-19 concerns: socially isolating  2) Routine: A+ RI, tdap [   ], flu [ ]   3) Risk factors:   A: Hx of CS: desires RCS orders placed 2020              B: Hypothyroidism:  Sees endocrinology, tsh and t4 pending end of May 2020   4) Return: 4 weeks     [facility]

## 2020-06-04 NOTE — NURSING NOTE
"Chief Complaint   Patient presents with     Prenatal Care       Initial /64   Wt 51.7 kg (114 lb)   LMP 02/10/2020   BMI 21.54 kg/m   Estimated body mass index is 21.54 kg/m  as calculated from the following:    Height as of 19: 1.549 m (5' 1\").    Weight as of this encounter: 51.7 kg (114 lb).  BP completed using cuff size: regular    Questioned patient about current smoking habits.  Pt. has never smoked.          13w5d      Vanessa Reyes, [facility]            "

## 2020-06-05 LAB
T4 FREE SERPL-MCNC: 1.27 NG/DL (ref 0.76–1.46)
TSH SERPL DL<=0.005 MIU/L-ACNC: 2.2 MU/L (ref 0.4–4)

## 2020-06-11 ENCOUNTER — MYC MEDICAL ADVICE (OUTPATIENT)
Dept: ENDOCRINOLOGY | Facility: CLINIC | Age: 30
End: 2020-06-11

## 2020-06-11 DIAGNOSIS — O99.281 HYPOTHYROIDISM DURING PREGNANCY IN FIRST TRIMESTER: ICD-10-CM

## 2020-06-11 DIAGNOSIS — E03.9 HYPOTHYROIDISM DURING PREGNANCY IN FIRST TRIMESTER: ICD-10-CM

## 2020-06-11 NOTE — RESULT ENCOUNTER NOTE
Scar,  Your TSH is in target range for pregnancy.  Continue the current dose of levothyroxine.  Looks like COVID restrictions have been lifted so lets plan on repeating labs in another 4 weeks.  Azalea Burk NP  Endocrinology

## 2020-06-15 DIAGNOSIS — E03.9 HYPOTHYROIDISM DURING PREGNANCY IN FIRST TRIMESTER: ICD-10-CM

## 2020-06-15 DIAGNOSIS — O99.281 HYPOTHYROIDISM DURING PREGNANCY IN FIRST TRIMESTER: ICD-10-CM

## 2020-06-15 RX ORDER — LEVOTHYROXINE SODIUM 112 UG/1
112 TABLET ORAL DAILY
Qty: 30 TABLET | Refills: 1 | Status: CANCELLED | OUTPATIENT
Start: 2020-06-15

## 2020-06-16 RX ORDER — LEVOTHYROXINE SODIUM 112 UG/1
112 TABLET ORAL DAILY
Qty: 30 TABLET | Refills: 2 | Status: SHIPPED | OUTPATIENT
Start: 2020-06-16 | End: 2020-07-01

## 2020-06-16 NOTE — TELEPHONE ENCOUNTER
My chart message sent to the patient.  She should be on levothyroxine 112 mcg.  I resent the Rx.  Azalea Burk NP  Endocrinology

## 2020-06-30 ENCOUNTER — PRENATAL OFFICE VISIT (OUTPATIENT)
Dept: OBGYN | Facility: CLINIC | Age: 30
End: 2020-06-30
Payer: COMMERCIAL

## 2020-06-30 VITALS
BODY MASS INDEX: 22.79 KG/M2 | HEIGHT: 61 IN | DIASTOLIC BLOOD PRESSURE: 62 MMHG | WEIGHT: 120.7 LBS | SYSTOLIC BLOOD PRESSURE: 100 MMHG

## 2020-06-30 DIAGNOSIS — E03.9 HYPOTHYROIDISM DURING PREGNANCY IN FIRST TRIMESTER: ICD-10-CM

## 2020-06-30 DIAGNOSIS — O99.281 HYPOTHYROIDISM DURING PREGNANCY IN FIRST TRIMESTER: ICD-10-CM

## 2020-06-30 DIAGNOSIS — Z34.92 PRENATAL CARE IN SECOND TRIMESTER: Primary | ICD-10-CM

## 2020-06-30 PROCEDURE — 99207 ZZC PRENATAL VISIT: CPT | Performed by: FAMILY MEDICINE

## 2020-06-30 PROCEDURE — 36415 COLL VENOUS BLD VENIPUNCTURE: CPT | Performed by: OBSTETRICS & GYNECOLOGY

## 2020-06-30 PROCEDURE — 84443 ASSAY THYROID STIM HORMONE: CPT | Performed by: OBSTETRICS & GYNECOLOGY

## 2020-06-30 PROCEDURE — 84439 ASSAY OF FREE THYROXINE: CPT | Performed by: OBSTETRICS & GYNECOLOGY

## 2020-06-30 ASSESSMENT — MIFFLIN-ST. JEOR: SCORE: 1219.87

## 2020-06-30 NOTE — NURSING NOTE
"17w3d    Chief Complaint   Patient presents with     Prenatal Care       Initial /62   Ht 1.549 m (5' 1\")   Wt 54.7 kg (120 lb 11.2 oz)   LMP 02/10/2020   BMI 22.81 kg/m   Estimated body mass index is 22.81 kg/m  as calculated from the following:    Height as of this encounter: 1.549 m (5' 1\").    Weight as of this encounter: 54.7 kg (120 lb 11.2 oz).  BP completed using cuff size: regular    Questioned patient about current smoking habits.  Pt. has never smoked.          The following HM Due: NONE           "

## 2020-06-30 NOTE — PATIENT INSTRUCTIONS
"Return 4 weeks   Return to clinic:  every 4 weeks till 28 weeks, then every 2 weeks till 36 weeks, then weekly till delivery      Phone numbers [place]:  Day/ night [phone] ask for ob triage  Emergency:  Call labor and delivery:  [phone]    What should I call about??    Contraction every 5 minutes for 1 hour 1 minute long (511), bleeding, loss of fluid, headache that doesn't resolve with tylenol, and decreased fetal movement     Start kick counts @ 26-28 weeks   There is an carlito for this!  It is called \"count the kicks\"  Keep track of movement and discover your normal baby movement pattern   guideline is listed below  Please call if you do not feel the baby move!  We will have you come in for fetal heart rate monitoring:   Perception of at least 10 FMs during 12 hours of normal maternal activity   Perception of least 10 FMs over two hours when the mother is at rest and focused on [facility] Address   [address], [place], [place] [address] [phone]    Dr. Neris Nur, DO    OB/GYN   [facility] and [facility]                                                      "

## 2020-06-30 NOTE — PROGRESS NOTES
CC: Here for routine prenatal visit   29 year old y/o  @ 17w3d with Estimated Date of Delivery: Dec 5, 2020     LMP 02/10/2020   See OB flowsheet  + fetal movement, no contractions, no bleeding, no loss of fluid   Discussed monitoring fetal movement     1) concerns: none   Covid-19 concerns: none   2) Routine: A+ RI, tdap [   ], flu [ ] no GS   3) Risk factors:   A: Hx of CS: desires RCS orders placed 2020              B: Hypothyroidism:  Sees endocrinology, tsh and t4 pending end of May 2020   4) Return: 4 weeks     [facility]

## 2020-07-01 ENCOUNTER — TELEPHONE (OUTPATIENT)
Dept: ENDOCRINOLOGY | Facility: CLINIC | Age: 30
End: 2020-07-01

## 2020-07-01 LAB
T4 FREE SERPL-MCNC: 1.4 NG/DL (ref 0.76–1.46)
TSH SERPL DL<=0.005 MIU/L-ACNC: 3.78 MU/L (ref 0.4–4)

## 2020-07-01 RX ORDER — LEVOTHYROXINE SODIUM 125 UG/1
125 TABLET ORAL DAILY
Qty: 30 TABLET | Refills: 1 | Status: SHIPPED | OUTPATIENT
Start: 2020-07-01 | End: 2020-08-05

## 2020-07-01 NOTE — RESULT ENCOUNTER NOTE
Please call  Yary,  Your TSH is a little elevated above target range for pregnancy.  Your TSH target range is less than 3.0.  I am increasing your levothyroxine dose to 125 mcg/day.  We will continue to monitor your labs monthly.  Azalea Burk NP  Endocrinology

## 2020-07-01 NOTE — TELEPHONE ENCOUNTER
Result Notes       Chelsea Monique RN   7/1/2020  8:56 AM       Called patient.  Mailbox full.  Unable to L/M.  See telephone encounter.  ENDY Greene Linda Diane, CHALINO RUDD   7/1/2020  8:50 AM       Please call   Yary,   Your TSH is a little elevated above target range for pregnancy.  Your TSH target range is less than 3.0.   I am increasing your levothyroxine dose to 125 mcg/day.   We will continue to monitor your labs monthly.   Azalea Burk NP   Endocrinology

## 2020-07-20 ENCOUNTER — ANCILLARY PROCEDURE (OUTPATIENT)
Dept: ULTRASOUND IMAGING | Facility: CLINIC | Age: 30
End: 2020-07-20
Attending: FAMILY MEDICINE
Payer: COMMERCIAL

## 2020-07-20 DIAGNOSIS — Z34.92 PRENATAL CARE IN SECOND TRIMESTER: ICD-10-CM

## 2020-07-20 PROCEDURE — 76805 OB US >/= 14 WKS SNGL FETUS: CPT | Performed by: OBSTETRICS & GYNECOLOGY

## 2020-07-27 ENCOUNTER — DOCUMENTATION ONLY (OUTPATIENT)
Dept: LAB | Facility: CLINIC | Age: 30
End: 2020-07-27

## 2020-07-27 DIAGNOSIS — E06.3 HYPOTHYROIDISM DUE TO HASHIMOTO'S THYROIDITIS: Primary | ICD-10-CM

## 2020-07-30 ENCOUNTER — PRENATAL OFFICE VISIT (OUTPATIENT)
Dept: OBGYN | Facility: CLINIC | Age: 30
End: 2020-07-30
Payer: COMMERCIAL

## 2020-07-30 VITALS — DIASTOLIC BLOOD PRESSURE: 60 MMHG | BODY MASS INDEX: 23.24 KG/M2 | SYSTOLIC BLOOD PRESSURE: 98 MMHG | WEIGHT: 123 LBS

## 2020-07-30 DIAGNOSIS — E06.3 HYPOTHYROIDISM DUE TO HASHIMOTO'S THYROIDITIS: ICD-10-CM

## 2020-07-30 DIAGNOSIS — E03.9 HYPOTHYROIDISM DURING PREGNANCY IN FIRST TRIMESTER: ICD-10-CM

## 2020-07-30 DIAGNOSIS — Z34.92 PRENATAL CARE IN SECOND TRIMESTER: Primary | ICD-10-CM

## 2020-07-30 DIAGNOSIS — O99.281 HYPOTHYROIDISM DURING PREGNANCY IN FIRST TRIMESTER: ICD-10-CM

## 2020-07-30 PROCEDURE — 84443 ASSAY THYROID STIM HORMONE: CPT | Performed by: CLINICAL NURSE SPECIALIST

## 2020-07-30 PROCEDURE — 99207 ZZC PRENATAL VISIT: CPT | Performed by: OBSTETRICS & GYNECOLOGY

## 2020-07-30 PROCEDURE — 36415 COLL VENOUS BLD VENIPUNCTURE: CPT | Performed by: CLINICAL NURSE SPECIALIST

## 2020-07-30 PROCEDURE — 84439 ASSAY OF FREE THYROXINE: CPT | Performed by: CLINICAL NURSE SPECIALIST

## 2020-07-30 NOTE — PROGRESS NOTES
Here for routine obstetric visit at 21w5d.  Feels well, no questions.    1) concerns: none   Covid-19 concerns: none   2) Routine: A+ RI, tdap [   ], flu [ ] no GS   3) Risk factors:   A: Hx of CS: desires RCS orders placed 11/30/2020              B: Hypothyroidism:  Sees endocrinology   4) Return: 4 weeks

## 2020-07-31 LAB
T4 FREE SERPL-MCNC: 1.29 NG/DL (ref 0.76–1.46)
TSH SERPL DL<=0.005 MIU/L-ACNC: 2.29 MU/L (ref 0.4–4)

## 2020-08-05 RX ORDER — LEVOTHYROXINE SODIUM 125 UG/1
125 TABLET ORAL DAILY
Qty: 30 TABLET | Refills: 1 | Status: SHIPPED | OUTPATIENT
Start: 2020-08-05 | End: 2020-09-01

## 2020-08-06 NOTE — RESULT ENCOUNTER NOTE
Yary,  Your TSH is now in target range.  Please continue levothyroxine 125 mcg/day.  I'll put refills on your prescription.  We can recheck thyroid levels in another month.  Azalea Burk NP  Endocrinology

## 2020-08-25 ENCOUNTER — MYC MEDICAL ADVICE (OUTPATIENT)
Dept: ENDOCRINOLOGY | Facility: CLINIC | Age: 30
End: 2020-08-25

## 2020-08-25 DIAGNOSIS — E03.9 HYPOTHYROIDISM DURING PREGNANCY IN FIRST TRIMESTER: ICD-10-CM

## 2020-08-25 DIAGNOSIS — E06.3 HYPOTHYROIDISM DUE TO HASHIMOTO'S THYROIDITIS: Primary | ICD-10-CM

## 2020-08-25 DIAGNOSIS — O99.281 HYPOTHYROIDISM DURING PREGNANCY IN FIRST TRIMESTER: ICD-10-CM

## 2020-08-26 RX ORDER — LEVOTHYROXINE SODIUM 125 UG/1
125 TABLET ORAL DAILY
Qty: 30 TABLET | Refills: 1 | OUTPATIENT
Start: 2020-08-26

## 2020-08-28 ENCOUNTER — PRENATAL OFFICE VISIT (OUTPATIENT)
Dept: OBGYN | Facility: CLINIC | Age: 30
End: 2020-08-28
Payer: COMMERCIAL

## 2020-08-28 VITALS — DIASTOLIC BLOOD PRESSURE: 62 MMHG | WEIGHT: 130 LBS | BODY MASS INDEX: 24.56 KG/M2 | SYSTOLIC BLOOD PRESSURE: 104 MMHG

## 2020-08-28 DIAGNOSIS — E06.3 HYPOTHYROIDISM DUE TO HASHIMOTO'S THYROIDITIS: ICD-10-CM

## 2020-08-28 DIAGNOSIS — O34.219 PREVIOUS CESAREAN DELIVERY, ANTEPARTUM CONDITION OR COMPLICATION: Primary | ICD-10-CM

## 2020-08-28 DIAGNOSIS — E03.9 HYPOTHYROIDISM AFFECTING PREGNANCY IN THIRD TRIMESTER: ICD-10-CM

## 2020-08-28 DIAGNOSIS — O99.283 HYPOTHYROIDISM AFFECTING PREGNANCY IN THIRD TRIMESTER: ICD-10-CM

## 2020-08-28 PROBLEM — Z37.9 NORMAL LABOR: Status: RESOLVED | Noted: 2019-05-05 | Resolved: 2020-08-28

## 2020-08-28 PROBLEM — Z30.41 SURVEILLANCE OF PREVIOUSLY PRESCRIBED CONTRACEPTIVE PILL: Status: RESOLVED | Noted: 2018-01-10 | Resolved: 2020-08-28

## 2020-08-28 PROBLEM — Z36.89 ENCOUNTER FOR TRIAGE IN PREGNANT PATIENT: Status: RESOLVED | Noted: 2019-05-05 | Resolved: 2020-08-28

## 2020-08-28 LAB
ERYTHROCYTE [DISTWIDTH] IN BLOOD BY AUTOMATED COUNT: 12.6 % (ref 10–15)
HCT VFR BLD AUTO: 38.3 % (ref 35–47)
HGB BLD-MCNC: 13 G/DL (ref 11.7–15.7)
MCH RBC QN AUTO: 30.9 PG (ref 26.5–33)
MCHC RBC AUTO-ENTMCNC: 33.9 G/DL (ref 31.5–36.5)
MCV RBC AUTO: 91 FL (ref 78–100)
PLATELET # BLD AUTO: 226 10E9/L (ref 150–450)
RBC # BLD AUTO: 4.21 10E12/L (ref 3.8–5.2)
WBC # BLD AUTO: 10.2 10E9/L (ref 4–11)

## 2020-08-28 PROCEDURE — 86780 TREPONEMA PALLIDUM: CPT | Performed by: OBSTETRICS & GYNECOLOGY

## 2020-08-28 PROCEDURE — 99207 ZZC COMPLICATED OB VISIT: CPT | Performed by: OBSTETRICS & GYNECOLOGY

## 2020-08-28 PROCEDURE — 36415 COLL VENOUS BLD VENIPUNCTURE: CPT | Performed by: OBSTETRICS & GYNECOLOGY

## 2020-08-28 PROCEDURE — 85027 COMPLETE CBC AUTOMATED: CPT | Performed by: OBSTETRICS & GYNECOLOGY

## 2020-08-28 PROCEDURE — 82950 GLUCOSE TEST: CPT | Performed by: OBSTETRICS & GYNECOLOGY

## 2020-08-28 PROCEDURE — 84443 ASSAY THYROID STIM HORMONE: CPT | Performed by: OBSTETRICS & GYNECOLOGY

## 2020-08-28 NOTE — PROGRESS NOTES
No c/o's.  28 week labs, TSH today.  Defer TDaP to next visit when she is over 27 weeks.   labor/Premature rupture of membranes precautions reviewed.  RTC 3 weeks.    Encounter Diagnoses   Name Primary?     Previous  delivery, antepartum condition or complication Yes     Hypothyroidism affecting pregnancy in third trimester        Risk factors listed above are stable and being addressed as noted.    Stanford Blum MD  [facility]

## 2020-08-28 NOTE — NURSING NOTE
"Chief Complaint   Patient presents with     Prenatal Care     25 weeks 6 days- no concerns        Initial /62 (BP Location: Right arm, Patient Position: Sitting, Cuff Size: Adult Regular)   Wt 59 kg (130 lb)   LMP 02/10/2020   BMI 24.56 kg/m   Estimated body mass index is 24.56 kg/m  as calculated from the following:    Height as of 20: 1.549 m (5' 1\").    Weight as of this encounter: 59 kg (130 lb).  BP completed using cuff size: regular    Questioned patient about current smoking habits.  Pt. has never smoked.          The following HM Due: NONE    25w6d  Rd Wetzel CMA                "

## 2020-08-29 LAB
GLUCOSE 1H P 50 G GLC PO SERPL-MCNC: 106 MG/DL (ref 60–129)
T PALLIDUM AB SER QL: NONREACTIVE
TSH SERPL DL<=0.005 MIU/L-ACNC: 3.14 MU/L (ref 0.4–4)

## 2020-09-01 RX ORDER — LEVOTHYROXINE SODIUM 137 UG/1
137 TABLET ORAL DAILY
Qty: 30 TABLET | Refills: 1 | Status: SHIPPED | OUTPATIENT
Start: 2020-09-01 | End: 2020-09-02

## 2020-09-01 NOTE — RESULT ENCOUNTER NOTE
Please call if she doesn't read her lab result message.  Yary,  Your TSH is slightly above target.  I am increasing your levothyroxine dose to 137 mcg/day.  I'll send a new prescription to your pharmacy.  We'll check labs again in another 4 weeks.  Let me know if you have any questions.  Azalea Burk NP  Endocrinology

## 2020-09-02 DIAGNOSIS — O99.281 HYPOTHYROIDISM DURING PREGNANCY IN FIRST TRIMESTER: ICD-10-CM

## 2020-09-02 DIAGNOSIS — E03.9 HYPOTHYROIDISM DURING PREGNANCY IN FIRST TRIMESTER: ICD-10-CM

## 2020-09-02 RX ORDER — LEVOTHYROXINE SODIUM 137 UG/1
137 TABLET ORAL DAILY
Qty: 90 TABLET | Refills: 0 | Status: SHIPPED | OUTPATIENT
Start: 2020-09-02 | End: 2020-11-16

## 2020-09-02 NOTE — TELEPHONE ENCOUNTER
Disp  Refills  Start  End  SOLA    levothyroxine (SYNTHROID/LEVOTHROID) 137 MCG tablet  30 tablet  1  9/1/2020   --    Sig - Route: Take 1 tablet (137 mcg) by mouth daily - Oral      Message from Pharm- Needs 90 Days per insurance for coverage

## 2020-09-21 ENCOUNTER — PRENATAL OFFICE VISIT (OUTPATIENT)
Dept: OBGYN | Facility: CLINIC | Age: 30
End: 2020-09-21
Payer: COMMERCIAL

## 2020-09-21 VITALS — BODY MASS INDEX: 25.51 KG/M2 | WEIGHT: 135 LBS | DIASTOLIC BLOOD PRESSURE: 52 MMHG | SYSTOLIC BLOOD PRESSURE: 100 MMHG

## 2020-09-21 DIAGNOSIS — O34.219 PREVIOUS CESAREAN DELIVERY, ANTEPARTUM CONDITION OR COMPLICATION: Primary | ICD-10-CM

## 2020-09-21 DIAGNOSIS — O99.283 HYPOTHYROIDISM AFFECTING PREGNANCY IN THIRD TRIMESTER: ICD-10-CM

## 2020-09-21 DIAGNOSIS — E03.9 HYPOTHYROIDISM AFFECTING PREGNANCY IN THIRD TRIMESTER: ICD-10-CM

## 2020-09-21 PROCEDURE — 99207 ZZC PRENATAL VISIT: CPT | Performed by: OBSTETRICS & GYNECOLOGY

## 2020-09-21 PROCEDURE — 90715 TDAP VACCINE 7 YRS/> IM: CPT | Performed by: OBSTETRICS & GYNECOLOGY

## 2020-09-21 PROCEDURE — 90471 IMMUNIZATION ADMIN: CPT | Performed by: OBSTETRICS & GYNECOLOGY

## 2020-09-21 PROCEDURE — 96372 THER/PROPH/DIAG INJ SC/IM: CPT | Performed by: OBSTETRICS & GYNECOLOGY

## 2020-09-21 NOTE — PROGRESS NOTES
No c/o's.  TDaP today.  Wants to defer Flu shot to next visit.   labor/Premature rupture of membranes precautions reviewed.  RTC 3 weeks.    Encounter Diagnoses   Name Primary?     Previous  delivery, antepartum condition or complication Yes     Hypothyroidism affecting pregnancy in third trimester        Risk factors listed above are stable and being addressed as noted.    Stanford Blum MD  [facility]

## 2020-09-21 NOTE — NURSING NOTE
"Chief Complaint   Patient presents with     Prenatal Care     29 weeks 2 days- no concerns        Initial /52 (BP Location: Right arm, Patient Position: Sitting, Cuff Size: Adult Regular)   Wt 61.2 kg (135 lb)   LMP 02/10/2020   BMI 25.51 kg/m   Estimated body mass index is 25.51 kg/m  as calculated from the following:    Height as of 20: 1.549 m (5' 1\").    Weight as of this encounter: 61.2 kg (135 lb).  BP completed using cuff size: regular    Questioned patient about current smoking habits.  Pt. has never smoked.          The following HM Due: NONE    29w2d  Rd Wetzel CMA                "

## 2020-09-29 ENCOUNTER — TELEPHONE (OUTPATIENT)
Dept: LAB | Facility: CLINIC | Age: 30
End: 2020-09-29

## 2020-09-29 DIAGNOSIS — E06.3 HYPOTHYROIDISM DUE TO HASHIMOTO'S THYROIDITIS: Primary | ICD-10-CM

## 2020-10-02 ENCOUNTER — MYC MEDICAL ADVICE (OUTPATIENT)
Dept: ENDOCRINOLOGY | Facility: CLINIC | Age: 30
End: 2020-10-02

## 2020-10-02 DIAGNOSIS — E06.3 HYPOTHYROIDISM DUE TO HASHIMOTO'S THYROIDITIS: Primary | ICD-10-CM

## 2020-10-02 DIAGNOSIS — E06.3 HYPOTHYROIDISM DUE TO HASHIMOTO'S THYROIDITIS: ICD-10-CM

## 2020-10-02 DIAGNOSIS — O99.283 HYPOTHYROIDISM AFFECTING PREGNANCY IN THIRD TRIMESTER: ICD-10-CM

## 2020-10-02 DIAGNOSIS — E03.9 HYPOTHYROIDISM AFFECTING PREGNANCY IN THIRD TRIMESTER: ICD-10-CM

## 2020-10-02 LAB
T4 FREE SERPL-MCNC: 1.17 NG/DL (ref 0.76–1.46)
TSH SERPL DL<=0.005 MIU/L-ACNC: 2.43 MU/L (ref 0.4–4)

## 2020-10-02 PROCEDURE — 84443 ASSAY THYROID STIM HORMONE: CPT | Performed by: INTERNAL MEDICINE

## 2020-10-02 PROCEDURE — 36415 COLL VENOUS BLD VENIPUNCTURE: CPT | Performed by: INTERNAL MEDICINE

## 2020-10-02 PROCEDURE — 84439 ASSAY OF FREE THYROXINE: CPT | Performed by: INTERNAL MEDICINE

## 2020-10-02 NOTE — TELEPHONE ENCOUNTER
Dr. Nur will not be available on .  Patient would like to reschedule  to  with Dr. Nur.    Type of surgery: repeat  section  Location of surgery: [facility]  Date and time of surgery: 20 @ 9:00 am  Surgeon: Dr. Nur  Pre-Op Appt Date: @ prenatal appointment  Post-Op Appt Date:    Packet sent out: Yes  Pre-cert/Authorization completed:  No  Date: 10/2/20

## 2020-10-02 NOTE — TELEPHONE ENCOUNTER
Azalea- see [employer] message below.  I T'd up standing order to get her through rest of pregnancy.  Please advise.  Chelsea Monique RN

## 2020-10-06 NOTE — RESULT ENCOUNTER NOTE
Breezy Pringle,  As previously messaged, please continue levothyroxine 137 mcg/day.  Azalea Burk NP  Endocrinology

## 2020-10-13 ENCOUNTER — PRENATAL OFFICE VISIT (OUTPATIENT)
Dept: OBGYN | Facility: CLINIC | Age: 30
End: 2020-10-13
Payer: COMMERCIAL

## 2020-10-13 VITALS
SYSTOLIC BLOOD PRESSURE: 110 MMHG | WEIGHT: 141.1 LBS | DIASTOLIC BLOOD PRESSURE: 20 MMHG | HEIGHT: 61 IN | BODY MASS INDEX: 26.64 KG/M2

## 2020-10-13 DIAGNOSIS — R10.2 PELVIC PAIN IN FEMALE: Primary | ICD-10-CM

## 2020-10-13 PROCEDURE — 99207 PR PRENATAL VISIT: CPT | Performed by: FAMILY MEDICINE

## 2020-10-13 ASSESSMENT — MIFFLIN-ST. JEOR: SCORE: 1312.41

## 2020-10-13 NOTE — PROGRESS NOTES
"CC: Here for routine prenatal visit   29 year old y/o  @ 32w3d with Estimated Date of Delivery: Dec 5, 2020     BP (!) 110/20   Ht 1.549 m (5' 1\")   Wt 64 kg (141 lb 1.6 oz)   LMP 02/10/2020   BMI 26.66 kg/m       See OB flowsheet  + fetal movement, no contractions, no bleeding, no loss of fluid   Discussed monitoring fetal movement     1) concerns: pubic bone soreness   Covid-19 concerns: none   2) Routine: A+ RI, tdap [   ], flu [ ] no GS   3) Risk factors:   A: Hx of CS: desires RCS orders placed 2020              B: Hypothyroidism:  Sees endocrinology, 125 mcg daily, checking monthly     Patient Active Problem List   Diagnosis     Encounter for supervision of normal first pregnancy     Hypothyroidism due to Hashimoto's thyroiditis     History of  section     Previous  delivery, antepartum condition or complication     Hypothyroidism affecting pregnancy in third trimester       4) Return: 2 weeks     [place]    "

## 2020-10-13 NOTE — PATIENT INSTRUCTIONS
"Return 2 weeks   Return to clinic:  every 4 weeks till 28 weeks, then every 2 weeks till 36 weeks, then weekly till delivery      Phone numbers [place]:  Day/ night [phone] ask for ob triage  Emergency:  Call labor and delivery:  [phone]    What should I call about??    Contraction every 5 minutes for 1 hour 1 minute long (511), bleeding, loss of fluid, headache that doesn't resolve with tylenol, and decreased fetal movement     Start kick counts @ 26-28 weeks   There is an carlito for this!  It is called \"count the kicks\"  Keep track of movement and discover your normal baby movement pattern   guideline is listed below  Please call if you do not feel the baby move!  We will have you come in for fetal heart rate monitoring:   Perception of at least 10 FMs during 12 hours of normal maternal activity   Perception of least 10 FMs over two hours when the mother is at rest and focused on [facility] Address   [address], [place], [place] [address] [phone]    Dr. Neris Nur, DO    OB/GYN   [facility] and [facility]                                                      "

## 2020-10-13 NOTE — NURSING NOTE
"32w3d    Chief Complaint   Patient presents with     Prenatal Care       Initial BP (!) 110/20   Ht 1.549 m (5' 1\")   Wt 64 kg (141 lb 1.6 oz)   LMP 02/10/2020   BMI 26.66 kg/m   Estimated body mass index is 26.66 kg/m  as calculated from the following:    Height as of this encounter: 1.549 m (5' 1\").    Weight as of this encounter: 64 kg (141 lb 1.6 oz).  BP completed using cuff size: regular    Questioned patient about current smoking habits.  Pt. has never smoked.          The following HM Due: NONE           "

## 2020-10-29 ENCOUNTER — PRENATAL OFFICE VISIT (OUTPATIENT)
Dept: OBGYN | Facility: CLINIC | Age: 30
End: 2020-10-29
Payer: COMMERCIAL

## 2020-10-29 VITALS
DIASTOLIC BLOOD PRESSURE: 60 MMHG | WEIGHT: 142.4 LBS | SYSTOLIC BLOOD PRESSURE: 98 MMHG | HEIGHT: 61 IN | BODY MASS INDEX: 26.88 KG/M2

## 2020-10-29 DIAGNOSIS — Z34.93 PRENATAL CARE IN THIRD TRIMESTER: Primary | ICD-10-CM

## 2020-10-29 PROCEDURE — 99207 PR PRENATAL VISIT: CPT | Performed by: FAMILY MEDICINE

## 2020-10-29 PROCEDURE — 90471 IMMUNIZATION ADMIN: CPT | Performed by: FAMILY MEDICINE

## 2020-10-29 PROCEDURE — 90686 IIV4 VACC NO PRSV 0.5 ML IM: CPT | Performed by: FAMILY MEDICINE

## 2020-10-29 ASSESSMENT — MIFFLIN-ST. JEOR: SCORE: 1318.3

## 2020-10-29 NOTE — PATIENT INSTRUCTIONS
"Return 2 weeks   Return to clinic:  every 4 weeks till 28 weeks, then every 2 weeks till 36 weeks, then weekly till delivery      Phone numbers [place]:  Day/ night [phone] ask for ob triage  Emergency:  Call labor and delivery:  [phone]    What should I call about??    Contraction every 5 minutes for 1 hour 1 minute long (511), bleeding, loss of fluid, headache that doesn't resolve with tylenol, and decreased fetal movement     Start kick counts @ 26-28 weeks   There is an carlito for this!  It is called \"count the kicks\"  Keep track of movement and discover your normal baby movement pattern   guideline is listed below  Please call if you do not feel the baby move!  We will have you come in for fetal heart rate monitoring:   Perception of at least 10 FMs during 12 hours of normal maternal activity   Perception of least 10 FMs over two hours when the mother is at rest and focused on [facility] Address   [address], [place], [place] [address] [phone]    Dr. Neris Nur, DO    OB/GYN   [facility] and [facility]                                                      "

## 2020-10-29 NOTE — NURSING NOTE
"34w5d    Chief Complaint   Patient presents with     Prenatal Care       Initial BP 98/60   Ht 1.549 m (5' 1\")   Wt 64.6 kg (142 lb 6.4 oz)   LMP 02/10/2020   BMI 26.91 kg/m   Estimated body mass index is 26.91 kg/m  as calculated from the following:    Height as of this encounter: 1.549 m (5' 1\").    Weight as of this encounter: 64.6 kg (142 lb 6.4 oz).  BP completed using cuff size: regular    Questioned patient about current smoking habits.  Pt. has never smoked.          The following HM Due: NONE      "

## 2020-10-29 NOTE — PROGRESS NOTES
CC: Here for routine prenatal visit   29 year old y/o  @ 34w5d with Estimated Date of Delivery: Dec 5, 2020     LMP 02/10/2020   See OB flowsheet  + fetal movement, no contractions, no bleeding, no loss of fluid   Discussed monitoring fetal movement     1) concerns: none  Covid-19 concerns: none  2) Routine: A+ RI, tdap [x], flu [x] no GS   3) Risk factors:   A: Hx of CS: desires RCS orders placed 2020              B: Hypothyroidism:  Sees endocrinology, 125 mcg daily, checking monthly     Patient Active Problem List   Diagnosis     Encounter for supervision of normal first pregnancy     Hypothyroidism due to Hashimoto's thyroiditis     History of  section     Previous  delivery, antepartum condition or complication     Hypothyroidism affecting pregnancy in third trimester       4) Return: 2 weeks     [place]

## 2020-11-11 DIAGNOSIS — E06.3 HYPOTHYROIDISM DUE TO HASHIMOTO'S THYROIDITIS: ICD-10-CM

## 2020-11-11 DIAGNOSIS — O99.283 HYPOTHYROIDISM AFFECTING PREGNANCY IN THIRD TRIMESTER: ICD-10-CM

## 2020-11-11 DIAGNOSIS — E03.9 HYPOTHYROIDISM AFFECTING PREGNANCY IN THIRD TRIMESTER: ICD-10-CM

## 2020-11-11 PROCEDURE — 84439 ASSAY OF FREE THYROXINE: CPT | Performed by: CLINICAL NURSE SPECIALIST

## 2020-11-11 PROCEDURE — 84443 ASSAY THYROID STIM HORMONE: CPT | Performed by: CLINICAL NURSE SPECIALIST

## 2020-11-12 ENCOUNTER — PRENATAL OFFICE VISIT (OUTPATIENT)
Dept: OBGYN | Facility: CLINIC | Age: 30
End: 2020-11-12
Payer: COMMERCIAL

## 2020-11-12 VITALS — DIASTOLIC BLOOD PRESSURE: 66 MMHG | WEIGHT: 143.4 LBS | BODY MASS INDEX: 27.1 KG/M2 | SYSTOLIC BLOOD PRESSURE: 106 MMHG

## 2020-11-12 DIAGNOSIS — Z34.93 PRENATAL CARE IN THIRD TRIMESTER: Primary | ICD-10-CM

## 2020-11-12 LAB
T4 FREE SERPL-MCNC: 1.24 NG/DL (ref 0.76–1.46)
TSH SERPL DL<=0.005 MIU/L-ACNC: 3.72 MU/L (ref 0.4–4)

## 2020-11-12 PROCEDURE — 87653 STREP B DNA AMP PROBE: CPT | Performed by: OBSTETRICS & GYNECOLOGY

## 2020-11-12 PROCEDURE — 99207 PR PRENATAL VISIT: CPT | Performed by: OBSTETRICS & GYNECOLOGY

## 2020-11-12 NOTE — NURSING NOTE
"Chief Complaint   Patient presents with     Prenatal Care   36w5d  Active baby  GBS today    initial /66   Wt 65 kg (143 lb 6.4 oz)   LMP 02/10/2020   BMI 27.10 kg/m   Estimated body mass index is 27.1 kg/m  as calculated from the following:    Height as of 10/29/20: 1.549 m (5' 1\").    Weight as of this encounter: 65 kg (143 lb 6.4 oz).  BP completed using cuff size regular.  Janny Braden CMA    "

## 2020-11-12 NOTE — PROGRESS NOTES
Doing well.   No complaints.   Denies VB, ctx, LOF. +FM  /66   Wt 65 kg (143 lb 6.4 oz)   LMP 02/10/2020   BMI 27.10 kg/m    General Appearance: NAD  Abdomen: Gravid, NT  Refer to flow sheet above.   A/P: 29 year old  at 36w5d  -- hx of ; scheduled  section at 39w  -- GBS collected  -- labor precautions reviewed      Tasha Gilmore MD  [facility]

## 2020-11-13 LAB
GP B STREP DNA SPEC QL NAA+PROBE: NEGATIVE
SPECIMEN SOURCE: NORMAL

## 2020-11-13 NOTE — RESULT ENCOUNTER NOTE
Yary,  Your TSH is in normal range but a little above target range for pregnancy.  I believe you are scheduled for a   so increasing your levothyroxine dose probably won't make a big difference at this time however, you could increase slightly to an extra 1/2 tab per week until you deliver (take one tablet six days per week and 1/5 tablets one day each week).  If we didn't discuss this before, you should go back to your pre-pregnancy levothyroxine dose after you deliver which I believe is 112 mcg/day.  I recommend retesting thyroid labs about 6-8 weeks after delivery.  Let me know if you will need a new prescription for the 112 mcg tabs.  Also, let me know if you have any questions.  Azalea Burk NP  Endocrinology

## 2020-11-14 ENCOUNTER — MYC MEDICAL ADVICE (OUTPATIENT)
Dept: ENDOCRINOLOGY | Facility: CLINIC | Age: 30
End: 2020-11-14

## 2020-11-14 DIAGNOSIS — E03.9 HYPOTHYROIDISM DURING PREGNANCY IN FIRST TRIMESTER: ICD-10-CM

## 2020-11-14 DIAGNOSIS — O99.281 HYPOTHYROIDISM DURING PREGNANCY IN FIRST TRIMESTER: ICD-10-CM

## 2020-11-16 RX ORDER — LEVOTHYROXINE SODIUM 112 UG/1
112 TABLET ORAL DAILY
Qty: 30 TABLET | Refills: 1 | Status: SHIPPED | OUTPATIENT
Start: 2020-11-16 | End: 2020-11-20

## 2020-11-16 RX ORDER — LEVOTHYROXINE SODIUM 137 UG/1
TABLET ORAL
Qty: 30 TABLET | Refills: 0 | Status: SHIPPED | OUTPATIENT
Start: 2020-11-16 | End: 2020-12-24

## 2020-11-16 NOTE — TELEPHONE ENCOUNTER
Both doses sent.  ENDY Greene, Azlaea Murillo, CHALINO CNP   2020  9:17 AM      Yary,  Your TSH is in normal range but a little above target range for pregnancy.  I believe you are scheduled for a   so increasing your levothyroxine dose probably won't make a big difference at this time however, you could increase slightly to an extra 1/2 tab per week until you deliver (take one tablet six days per week and 1/5 tablets one day each week).  If we didn't discuss this before, you should go back to your pre-pregnancy levothyroxine dose after you deliver which I believe is 112 mcg/day.  I recommend retesting thyroid labs about 6-8 weeks after delivery.  Let me know if you will need a new prescription for the 112 mcg tabs.  Also, let me know if you have any questions.  Azalea Burk NP  Endocrinology

## 2020-11-17 ENCOUNTER — PRENATAL OFFICE VISIT (OUTPATIENT)
Dept: OBGYN | Facility: CLINIC | Age: 30
End: 2020-11-17
Payer: COMMERCIAL

## 2020-11-17 VITALS
DIASTOLIC BLOOD PRESSURE: 68 MMHG | SYSTOLIC BLOOD PRESSURE: 118 MMHG | BODY MASS INDEX: 27.15 KG/M2 | WEIGHT: 143.8 LBS | HEIGHT: 61 IN

## 2020-11-17 DIAGNOSIS — E03.9 HYPOTHYROIDISM DURING PREGNANCY IN FIRST TRIMESTER: ICD-10-CM

## 2020-11-17 DIAGNOSIS — O99.281 HYPOTHYROIDISM DURING PREGNANCY IN FIRST TRIMESTER: ICD-10-CM

## 2020-11-17 DIAGNOSIS — O34.219 PREVIOUS CESAREAN DELIVERY, ANTEPARTUM CONDITION OR COMPLICATION: Primary | ICD-10-CM

## 2020-11-17 PROCEDURE — 99207 PR PRENATAL VISIT: CPT | Performed by: FAMILY MEDICINE

## 2020-11-17 ASSESSMENT — MIFFLIN-ST. JEOR: SCORE: 1324.65

## 2020-11-17 NOTE — PATIENT INSTRUCTIONS
"Return weekly   Return to clinic:  every 4 weeks till 28 weeks, then every 2 weeks till 36 weeks, then weekly till delivery      Phone numbers [place]:  Day/ night [phone] ask for ob triage  Emergency:  Call labor and delivery:  [phone]    What should I call about??    Contraction every 5 minutes for 1 hour 1 minute long (511), bleeding, loss of fluid, headache that doesn't resolve with tylenol, and decreased fetal movement     Start kick counts @ 26-28 weeks   There is an carlito for this!  It is called \"count the kicks\"  Keep track of movement and discover your normal baby movement pattern   guideline is listed below  Please call if you do not feel the baby move!  We will have you come in for fetal heart rate monitoring:   Perception of at least 10 FMs during 12 hours of normal maternal activity   Perception of least 10 FMs over two hours when the mother is at rest and focused on [facility] Address   [address], [place], [place] [address] [phone]    Dr. Neris Nur, DO    OB/GYN   [facility] and [facility]                                                      "

## 2020-11-17 NOTE — TELEPHONE ENCOUNTER
Per pharmacy patient's insurance requires a 90 day supply (not pended)    RX is reported as patient not taking    Patient also has active RX for Levothyroxine 137mcg       Disp Refills Start End SOLA   levothyroxine (SYNTHROID/LEVOTHROID) 112 MCG tablet 30 tablet 1 11/16/2020  No   Sig - Route: Take 1 tablet (112 mcg) by mouth daily - Oral   Patient not taking: Reported on 11/17/2020

## 2020-11-17 NOTE — PROGRESS NOTES
"CC: Here for routine prenatal visit   29 year old y/o  @ 37w3d with Estimated Date of Delivery: Dec 5, 2020     /68   Ht 1.549 m (5' 1\")   Wt 65.2 kg (143 lb 12.8 oz)   LMP 02/10/2020   BMI 27.17 kg/m    See OB flowsheet  + fetal movement, no contractions, no bleeding, no loss of fluid   Discussed monitoring fetal movement     1) concerns: none   Covid-19 concerns: none   2) Routine: A+ RI, tdap [x], flu [x] no GS   3) Risk factors:   A: Hx of CS: desires RCS orders placed 2020              B: Hypothyroidism:  Sees endocrinology, 125 mcg daily, checking monthly   4) Return: weekly     [place]    "

## 2020-11-17 NOTE — NURSING NOTE
"37w3d    Chief Complaint   Patient presents with     Prenatal Care     GBS neg-- scheduled 20       Initial /68   Ht 1.549 m (5' 1\")   Wt 65.2 kg (143 lb 12.8 oz)   LMP 02/10/2020   BMI 27.17 kg/m   Estimated body mass index is 27.17 kg/m  as calculated from the following:    Height as of this encounter: 1.549 m (5' 1\").    Weight as of this encounter: 65.2 kg (143 lb 12.8 oz).  BP completed using cuff size: regular    Questioned patient about current smoking habits.  Pt. has never smoked.          The following HM Due: NONE             "

## 2020-11-19 NOTE — TELEPHONE ENCOUNTER
Routing refill request to provider for review/approval because:  Thalia given x1 and patient did not follow up, please advise

## 2020-11-20 RX ORDER — LEVOTHYROXINE SODIUM 112 UG/1
TABLET ORAL
Qty: 34 TABLET | Refills: 1 | Status: SHIPPED | OUTPATIENT
Start: 2020-11-20 | End: 2020-12-24

## 2020-11-20 NOTE — TELEPHONE ENCOUNTER
Levothyroxine approved.  Patient is pregnant and has been getting thyroid labs done regularly.  Azalea Burk NP  Endocrinology

## 2020-11-24 DIAGNOSIS — E03.9 HYPOTHYROIDISM DURING PREGNANCY IN FIRST TRIMESTER: ICD-10-CM

## 2020-11-24 DIAGNOSIS — O99.281 HYPOTHYROIDISM DURING PREGNANCY IN FIRST TRIMESTER: ICD-10-CM

## 2020-11-24 RX ORDER — LEVOTHYROXINE SODIUM 112 UG/1
TABLET ORAL
Qty: 34 TABLET | Refills: 1 | Status: CANCELLED | OUTPATIENT
Start: 2020-11-24

## 2020-11-24 NOTE — TELEPHONE ENCOUNTER
Called pharmacy, informed, may disregard 90 day request  Tala Munson RN, BSN  Message handled by CLINIC NURSE.

## 2020-11-27 ENCOUNTER — PRENATAL OFFICE VISIT (OUTPATIENT)
Dept: OBGYN | Facility: CLINIC | Age: 30
End: 2020-11-27
Payer: COMMERCIAL

## 2020-11-27 VITALS — WEIGHT: 147 LBS | BODY MASS INDEX: 27.78 KG/M2 | SYSTOLIC BLOOD PRESSURE: 106 MMHG | DIASTOLIC BLOOD PRESSURE: 68 MMHG

## 2020-11-27 DIAGNOSIS — O34.219 PREVIOUS CESAREAN DELIVERY, ANTEPARTUM CONDITION OR COMPLICATION: Primary | ICD-10-CM

## 2020-11-27 DIAGNOSIS — Z11.59 ENCOUNTER FOR SCREENING FOR OTHER VIRAL DISEASES: ICD-10-CM

## 2020-11-27 PROCEDURE — 99207 PR PRENATAL VISIT: CPT | Performed by: FAMILY MEDICINE

## 2020-11-27 PROCEDURE — U0003 INFECTIOUS AGENT DETECTION BY NUCLEIC ACID (DNA OR RNA); SEVERE ACUTE RESPIRATORY SYNDROME CORONAVIRUS 2 (SARS-COV-2) (CORONAVIRUS DISEASE [COVID-19]), AMPLIFIED PROBE TECHNIQUE, MAKING USE OF HIGH THROUGHPUT TECHNOLOGIES AS DESCRIBED BY CMS-2020-01-R: HCPCS | Performed by: FAMILY MEDICINE

## 2020-11-27 NOTE — PATIENT INSTRUCTIONS
"Return weekly   Return to clinic:  every 4 weeks till 28 weeks, then every 2 weeks till 36 weeks, then weekly till delivery      Phone numbers [place]:  Day/ night [phone] ask for ob triage  Emergency:  Call labor and delivery:  [phone]    What should I call about??    Contraction every 5 minutes for 1 hour 1 minute long (511), bleeding, loss of fluid, headache that doesn't resolve with tylenol, and decreased fetal movement     Start kick counts @ 26-28 weeks   There is an carlito for this!  It is called \"count the kicks\"  Keep track of movement and discover your normal baby movement pattern   guideline is listed below  Please call if you do not feel the baby move!  We will have you come in for fetal heart rate monitoring:   Perception of at least 10 FMs during 12 hours of normal maternal activity   Perception of least 10 FMs over two hours when the mother is at rest and focused on [facility] Address   [address], [place], [place] [address] [phone]    Dr. Neris Nur, DO    OB/GYN   [facility]                                                    "

## 2020-11-27 NOTE — NURSING NOTE
"Chief Complaint   Patient presents with     Prenatal Care     38 6/7 weeks       Initial /68   Wt 66.7 kg (147 lb)   LMP 02/10/2020   BMI 27.78 kg/m   Estimated body mass index is 27.78 kg/m  as calculated from the following:    Height as of 20: 1.549 m (5' 1\").    Weight as of this encounter: 66.7 kg (147 lb).  BP completed using cuff size: regular    Questioned patient about current smoking habits.  Pt. has never smoked.          The following HM Due: NONE    +fetal movement  -swelling    Liliya Soto, CMA    "

## 2020-11-27 NOTE — PROGRESS NOTES
CC: Here for routine prenatal visit   29 year old y/o  @ 38w6d with Estimated Date of Delivery: Dec 5, 2020     LMP 02/10/2020   See OB flowsheet  + fetal movement, no contractions, no bleeding, no loss of fluid  Discussed monitoring fetal movement     1) concerns:   Covid-19 concerns: none   2) Routine: A+ RI, tdap [x], flu [x] no GS   3) Risk factors:   A: Hx of CS: desires RCS orders placed 2020              B: Hypothyroidism:  Sees endocrinology, 125 mcg daily, checking monthly   4) Return: weekly       [facility]

## 2020-11-28 LAB
SARS-COV-2 RNA SPEC QL NAA+PROBE: NOT DETECTED
SPECIMEN SOURCE: NORMAL

## 2020-11-30 ENCOUNTER — HOSPITAL ENCOUNTER (OUTPATIENT)
Dept: LAB | Facility: CLINIC | Age: 30
Discharge: HOME | End: 2020-11-30
Attending: FAMILY MEDICINE | Admitting: FAMILY MEDICINE
Payer: COMMERCIAL

## 2020-11-30 DIAGNOSIS — Z01.812 PRE-OPERATIVE LABORATORY EXAMINATION: ICD-10-CM

## 2020-11-30 LAB
ABO + RH BLD: NORMAL
ABO + RH BLD: NORMAL
BLD GP AB SCN SERPL QL: NORMAL
BLOOD BANK CMNT PATIENT-IMP: NORMAL
HGB BLD-MCNC: 13.2 G/DL (ref 11.7–15.7)
SPECIMEN EXP DATE BLD: NORMAL

## 2020-11-30 PROCEDURE — 36415 COLL VENOUS BLD VENIPUNCTURE: CPT | Performed by: FAMILY MEDICINE

## 2020-11-30 PROCEDURE — 86780 TREPONEMA PALLIDUM: CPT | Performed by: FAMILY MEDICINE

## 2020-11-30 PROCEDURE — 86901 BLOOD TYPING SEROLOGIC RH(D): CPT | Performed by: FAMILY MEDICINE

## 2020-11-30 PROCEDURE — 86850 RBC ANTIBODY SCREEN: CPT | Performed by: FAMILY MEDICINE

## 2020-11-30 PROCEDURE — 86900 BLOOD TYPING SEROLOGIC ABO: CPT | Performed by: FAMILY MEDICINE

## 2020-11-30 PROCEDURE — 85018 HEMOGLOBIN: CPT | Performed by: FAMILY MEDICINE

## 2020-12-01 ENCOUNTER — HOSPITAL ENCOUNTER (INPATIENT)
Facility: CLINIC | Age: 30
LOS: 2 days | Discharge: HOME HEALTH | End: 2020-12-03
Attending: FAMILY MEDICINE | Admitting: FAMILY MEDICINE
Payer: COMMERCIAL

## 2020-12-01 ENCOUNTER — ANESTHESIA EVENT (OUTPATIENT)
Dept: OBGYN | Facility: CLINIC | Age: 30
End: 2020-12-01
Payer: COMMERCIAL

## 2020-12-01 ENCOUNTER — ANESTHESIA (OUTPATIENT)
Dept: OBGYN | Facility: CLINIC | Age: 30
End: 2020-12-01
Payer: COMMERCIAL

## 2020-12-01 DIAGNOSIS — Z98.891 HISTORY OF CESAREAN SECTION: ICD-10-CM

## 2020-12-01 LAB — T PALLIDUM AB SER QL: NONREACTIVE

## 2020-12-01 PROCEDURE — 370N000001 HC ANESTHESIA TECHNICAL FEE, 1ST 30 MIN: Performed by: FAMILY MEDICINE

## 2020-12-01 PROCEDURE — 360N000021 HC SURGERY LEVEL 3 EA 15 ADDTL MIN: Performed by: FAMILY MEDICINE

## 2020-12-01 PROCEDURE — 272N000001 HC OR GENERAL SUPPLY STERILE: Performed by: FAMILY MEDICINE

## 2020-12-01 PROCEDURE — C1765 ADHESION BARRIER: HCPCS | Performed by: FAMILY MEDICINE

## 2020-12-01 PROCEDURE — 258N000003 HC RX IP 258 OP 636: Performed by: FAMILY MEDICINE

## 2020-12-01 PROCEDURE — 370N000002 HC ANESTHESIA TECHNICAL FEE, EACH ADDTL 15 MIN: Performed by: FAMILY MEDICINE

## 2020-12-01 PROCEDURE — 258N000003 HC RX IP 258 OP 636: Performed by: NURSE ANESTHETIST, CERTIFIED REGISTERED

## 2020-12-01 PROCEDURE — 250N000011 HC RX IP 250 OP 636: Performed by: FAMILY MEDICINE

## 2020-12-01 PROCEDURE — 250N000009 HC RX 250: Performed by: NURSE ANESTHETIST, CERTIFIED REGISTERED

## 2020-12-01 PROCEDURE — 250N000009 HC RX 250: Performed by: FAMILY MEDICINE

## 2020-12-01 PROCEDURE — 120N000001 HC R&B MED SURG/OB

## 2020-12-01 PROCEDURE — 360N000020 HC SURGERY LEVEL 3 1ST 30 MIN: Performed by: FAMILY MEDICINE

## 2020-12-01 PROCEDURE — 250N000013 HC RX MED GY IP 250 OP 250 PS 637: Performed by: FAMILY MEDICINE

## 2020-12-01 PROCEDURE — 250N000011 HC RX IP 250 OP 636: Performed by: ANESTHESIOLOGY

## 2020-12-01 PROCEDURE — 761N000003 HC RECOVERY PHASE 1 LEVEL 2 FIRST HR: Performed by: FAMILY MEDICINE

## 2020-12-01 PROCEDURE — 250N000011 HC RX IP 250 OP 636: Performed by: NURSE ANESTHETIST, CERTIFIED REGISTERED

## 2020-12-01 PROCEDURE — 59510 CESAREAN DELIVERY: CPT | Performed by: FAMILY MEDICINE

## 2020-12-01 RX ORDER — NALOXONE HYDROCHLORIDE 0.4 MG/ML
0.2 INJECTION, SOLUTION INTRAMUSCULAR; INTRAVENOUS; SUBCUTANEOUS
Status: DISCONTINUED | OUTPATIENT
Start: 2020-12-01 | End: 2020-12-01

## 2020-12-01 RX ORDER — FENTANYL CITRATE 50 UG/ML
INJECTION, SOLUTION INTRAMUSCULAR; INTRAVENOUS PRN
Status: DISCONTINUED | OUTPATIENT
Start: 2020-12-01 | End: 2020-12-01

## 2020-12-01 RX ORDER — NALOXONE HYDROCHLORIDE 0.4 MG/ML
0.4 INJECTION, SOLUTION INTRAMUSCULAR; INTRAVENOUS; SUBCUTANEOUS
Status: DISCONTINUED | OUTPATIENT
Start: 2020-12-01 | End: 2020-12-03 | Stop reason: HOSPADM

## 2020-12-01 RX ORDER — OXYTOCIN/0.9 % SODIUM CHLORIDE 30/500 ML
100 PLASTIC BAG, INJECTION (ML) INTRAVENOUS CONTINUOUS
Status: DISCONTINUED | OUTPATIENT
Start: 2020-12-01 | End: 2020-12-03

## 2020-12-01 RX ORDER — OXYTOCIN/0.9 % SODIUM CHLORIDE 30/500 ML
340 PLASTIC BAG, INJECTION (ML) INTRAVENOUS CONTINUOUS PRN
Status: DISCONTINUED | OUTPATIENT
Start: 2020-12-01 | End: 2020-12-03 | Stop reason: HOSPADM

## 2020-12-01 RX ORDER — ONDANSETRON 4 MG/1
4 TABLET, ORALLY DISINTEGRATING ORAL EVERY 30 MIN PRN
Status: DISCONTINUED | OUTPATIENT
Start: 2020-12-01 | End: 2020-12-03 | Stop reason: HOSPADM

## 2020-12-01 RX ORDER — CEFAZOLIN SODIUM 2 G/100ML
2 INJECTION, SOLUTION INTRAVENOUS
Status: COMPLETED | OUTPATIENT
Start: 2020-12-01 | End: 2020-12-01

## 2020-12-01 RX ORDER — DIAZEPAM 10 MG/2ML
2.5 INJECTION, SOLUTION INTRAMUSCULAR; INTRAVENOUS
Status: DISCONTINUED | OUTPATIENT
Start: 2020-12-01 | End: 2020-12-01 | Stop reason: HOSPADM

## 2020-12-01 RX ORDER — PHENYLEPHRINE HYDROCHLORIDE 10 MG/ML
INJECTION INTRAVENOUS PRN
Status: DISCONTINUED | OUTPATIENT
Start: 2020-12-01 | End: 2020-12-01

## 2020-12-01 RX ORDER — MEPERIDINE HYDROCHLORIDE 25 MG/ML
12.5 INJECTION INTRAMUSCULAR; INTRAVENOUS; SUBCUTANEOUS
Status: DISCONTINUED | OUTPATIENT
Start: 2020-12-01 | End: 2020-12-03 | Stop reason: HOSPADM

## 2020-12-01 RX ORDER — OXYCODONE HYDROCHLORIDE 5 MG/1
5 TABLET ORAL EVERY 4 HOURS PRN
Status: DISCONTINUED | OUTPATIENT
Start: 2020-12-01 | End: 2020-12-03 | Stop reason: HOSPADM

## 2020-12-01 RX ORDER — KETOROLAC TROMETHAMINE 30 MG/ML
30 INJECTION, SOLUTION INTRAMUSCULAR; INTRAVENOUS EVERY 6 HOURS PRN
Status: DISCONTINUED | OUTPATIENT
Start: 2020-12-02 | End: 2020-12-01

## 2020-12-01 RX ORDER — AMOXICILLIN 250 MG
1 CAPSULE ORAL 2 TIMES DAILY
Status: DISCONTINUED | OUTPATIENT
Start: 2020-12-01 | End: 2020-12-03 | Stop reason: HOSPADM

## 2020-12-01 RX ORDER — IBUPROFEN 800 MG/1
800 TABLET, FILM COATED ORAL EVERY 6 HOURS
Status: DISCONTINUED | OUTPATIENT
Start: 2020-12-02 | End: 2020-12-03 | Stop reason: HOSPADM

## 2020-12-01 RX ORDER — EPHEDRINE SULFATE 50 MG/ML
INJECTION, SOLUTION INTRAVENOUS PRN
Status: DISCONTINUED | OUTPATIENT
Start: 2020-12-01 | End: 2020-12-01

## 2020-12-01 RX ORDER — SIMETHICONE 80 MG
80 TABLET,CHEWABLE ORAL 4 TIMES DAILY PRN
Status: DISCONTINUED | OUTPATIENT
Start: 2020-12-01 | End: 2020-12-03 | Stop reason: HOSPADM

## 2020-12-01 RX ORDER — AMOXICILLIN 250 MG
2 CAPSULE ORAL 2 TIMES DAILY
Status: DISCONTINUED | OUTPATIENT
Start: 2020-12-01 | End: 2020-12-03 | Stop reason: HOSPADM

## 2020-12-01 RX ORDER — MODIFIED LANOLIN
OINTMENT (GRAM) TOPICAL
Status: DISCONTINUED | OUTPATIENT
Start: 2020-12-01 | End: 2020-12-03 | Stop reason: HOSPADM

## 2020-12-01 RX ORDER — NALOXONE HYDROCHLORIDE 0.4 MG/ML
0.2 INJECTION, SOLUTION INTRAMUSCULAR; INTRAVENOUS; SUBCUTANEOUS
Status: DISCONTINUED | OUTPATIENT
Start: 2020-12-01 | End: 2020-12-03 | Stop reason: HOSPADM

## 2020-12-01 RX ORDER — ONDANSETRON 2 MG/ML
4 INJECTION INTRAMUSCULAR; INTRAVENOUS EVERY 6 HOURS PRN
Status: DISCONTINUED | OUTPATIENT
Start: 2020-12-01 | End: 2020-12-03 | Stop reason: HOSPADM

## 2020-12-01 RX ORDER — HYDROCORTISONE 2.5 %
CREAM (GRAM) TOPICAL 3 TIMES DAILY PRN
Status: DISCONTINUED | OUTPATIENT
Start: 2020-12-01 | End: 2020-12-03 | Stop reason: HOSPADM

## 2020-12-01 RX ORDER — SODIUM CHLORIDE, SODIUM LACTATE, POTASSIUM CHLORIDE, CALCIUM CHLORIDE 600; 310; 30; 20 MG/100ML; MG/100ML; MG/100ML; MG/100ML
INJECTION, SOLUTION INTRAVENOUS CONTINUOUS
Status: DISCONTINUED | OUTPATIENT
Start: 2020-12-01 | End: 2020-12-03

## 2020-12-01 RX ORDER — FENTANYL CITRATE 50 UG/ML
25-50 INJECTION, SOLUTION INTRAMUSCULAR; INTRAVENOUS
Status: DISCONTINUED | OUTPATIENT
Start: 2020-12-01 | End: 2020-12-01 | Stop reason: HOSPADM

## 2020-12-01 RX ORDER — HYDRALAZINE HYDROCHLORIDE 20 MG/ML
2.5-5 INJECTION INTRAMUSCULAR; INTRAVENOUS EVERY 10 MIN PRN
Status: DISCONTINUED | OUTPATIENT
Start: 2020-12-01 | End: 2020-12-01 | Stop reason: HOSPADM

## 2020-12-01 RX ORDER — CEFAZOLIN SODIUM 1 G/3ML
1 INJECTION, POWDER, FOR SOLUTION INTRAMUSCULAR; INTRAVENOUS SEE ADMIN INSTRUCTIONS
Status: DISCONTINUED | OUTPATIENT
Start: 2020-12-01 | End: 2020-12-01

## 2020-12-01 RX ORDER — DEXTROSE, SODIUM CHLORIDE, SODIUM LACTATE, POTASSIUM CHLORIDE, AND CALCIUM CHLORIDE 5; .6; .31; .03; .02 G/100ML; G/100ML; G/100ML; G/100ML; G/100ML
INJECTION, SOLUTION INTRAVENOUS CONTINUOUS
Status: DISCONTINUED | OUTPATIENT
Start: 2020-12-01 | End: 2020-12-03

## 2020-12-01 RX ORDER — MISOPROSTOL 200 UG/1
400 TABLET ORAL
Status: DISCONTINUED | OUTPATIENT
Start: 2020-12-01 | End: 2020-12-03 | Stop reason: HOSPADM

## 2020-12-01 RX ORDER — ALBUTEROL SULFATE 0.83 MG/ML
2.5 SOLUTION RESPIRATORY (INHALATION) EVERY 4 HOURS PRN
Status: DISCONTINUED | OUTPATIENT
Start: 2020-12-01 | End: 2020-12-01 | Stop reason: HOSPADM

## 2020-12-01 RX ORDER — ONDANSETRON 2 MG/ML
4 INJECTION INTRAMUSCULAR; INTRAVENOUS EVERY 30 MIN PRN
Status: DISCONTINUED | OUTPATIENT
Start: 2020-12-01 | End: 2020-12-03 | Stop reason: HOSPADM

## 2020-12-01 RX ORDER — FENTANYL CITRATE 50 UG/ML
25-50 INJECTION, SOLUTION INTRAMUSCULAR; INTRAVENOUS
Status: DISCONTINUED | OUTPATIENT
Start: 2020-12-01 | End: 2020-12-03 | Stop reason: HOSPADM

## 2020-12-01 RX ORDER — LABETALOL 20 MG/4 ML (5 MG/ML) INTRAVENOUS SYRINGE
10
Status: DISCONTINUED | OUTPATIENT
Start: 2020-12-01 | End: 2020-12-01 | Stop reason: HOSPADM

## 2020-12-01 RX ORDER — MORPHINE SULFATE 1 MG/ML
INJECTION, SOLUTION EPIDURAL; INTRATHECAL; INTRAVENOUS PRN
Status: DISCONTINUED | OUTPATIENT
Start: 2020-12-01 | End: 2020-12-01

## 2020-12-01 RX ORDER — LIDOCAINE 40 MG/G
CREAM TOPICAL
Status: DISCONTINUED | OUTPATIENT
Start: 2020-12-01 | End: 2020-12-03 | Stop reason: HOSPADM

## 2020-12-01 RX ORDER — BUPIVACAINE HYDROCHLORIDE 7.5 MG/ML
INJECTION, SOLUTION INTRASPINAL PRN
Status: DISCONTINUED | OUTPATIENT
Start: 2020-12-01 | End: 2020-12-01

## 2020-12-01 RX ORDER — NALOXONE HYDROCHLORIDE 0.4 MG/ML
0.4 INJECTION, SOLUTION INTRAMUSCULAR; INTRAVENOUS; SUBCUTANEOUS
Status: DISCONTINUED | OUTPATIENT
Start: 2020-12-01 | End: 2020-12-01

## 2020-12-01 RX ORDER — ACETAMINOPHEN 650 MG/1
650 SUPPOSITORY RECTAL ONCE
Status: DISCONTINUED | OUTPATIENT
Start: 2020-12-01 | End: 2020-12-03 | Stop reason: HOSPADM

## 2020-12-01 RX ORDER — BISACODYL 10 MG
10 SUPPOSITORY, RECTAL RECTAL DAILY PRN
Status: DISCONTINUED | OUTPATIENT
Start: 2020-12-03 | End: 2020-12-03 | Stop reason: HOSPADM

## 2020-12-01 RX ORDER — SCOLOPAMINE TRANSDERMAL SYSTEM 1 MG/1
1 PATCH, EXTENDED RELEASE TRANSDERMAL ONCE
Status: DISCONTINUED | OUTPATIENT
Start: 2020-12-01 | End: 2020-12-01

## 2020-12-01 RX ORDER — OXYTOCIN/0.9 % SODIUM CHLORIDE 30/500 ML
PLASTIC BAG, INJECTION (ML) INTRAVENOUS PRN
Status: DISCONTINUED | OUTPATIENT
Start: 2020-12-01 | End: 2020-12-01

## 2020-12-01 RX ORDER — SODIUM CHLORIDE, SODIUM LACTATE, POTASSIUM CHLORIDE, CALCIUM CHLORIDE 600; 310; 30; 20 MG/100ML; MG/100ML; MG/100ML; MG/100ML
INJECTION, SOLUTION INTRAVENOUS CONTINUOUS
Status: DISCONTINUED | OUTPATIENT
Start: 2020-12-01 | End: 2020-12-01

## 2020-12-01 RX ORDER — KETOROLAC TROMETHAMINE 30 MG/ML
30 INJECTION, SOLUTION INTRAMUSCULAR; INTRAVENOUS EVERY 6 HOURS PRN
Status: DISCONTINUED | OUTPATIENT
Start: 2020-12-01 | End: 2020-12-03 | Stop reason: HOSPADM

## 2020-12-01 RX ORDER — OXYTOCIN 10 [USP'U]/ML
10 INJECTION, SOLUTION INTRAMUSCULAR; INTRAVENOUS
Status: DISCONTINUED | OUTPATIENT
Start: 2020-12-01 | End: 2020-12-03 | Stop reason: HOSPADM

## 2020-12-01 RX ORDER — TRANEXAMIC ACID 10 MG/ML
1 INJECTION, SOLUTION INTRAVENOUS EVERY 30 MIN PRN
Status: DISCONTINUED | OUTPATIENT
Start: 2020-12-01 | End: 2020-12-03 | Stop reason: HOSPADM

## 2020-12-01 RX ORDER — HYDROMORPHONE HYDROCHLORIDE 1 MG/ML
.3-.5 INJECTION, SOLUTION INTRAMUSCULAR; INTRAVENOUS; SUBCUTANEOUS EVERY 10 MIN PRN
Status: DISCONTINUED | OUTPATIENT
Start: 2020-12-01 | End: 2020-12-03 | Stop reason: HOSPADM

## 2020-12-01 RX ORDER — CITRIC ACID/SODIUM CITRATE 334-500MG
30 SOLUTION, ORAL ORAL
Status: COMPLETED | OUTPATIENT
Start: 2020-12-01 | End: 2020-12-01

## 2020-12-01 RX ORDER — ONDANSETRON 2 MG/ML
INJECTION INTRAMUSCULAR; INTRAVENOUS PRN
Status: DISCONTINUED | OUTPATIENT
Start: 2020-12-01 | End: 2020-12-01

## 2020-12-01 RX ORDER — ONDANSETRON 2 MG/ML
4 INJECTION INTRAMUSCULAR; INTRAVENOUS EVERY 4 HOURS PRN
Status: DISCONTINUED | OUTPATIENT
Start: 2020-12-01 | End: 2020-12-01

## 2020-12-01 RX ORDER — ACETAMINOPHEN 325 MG/1
975 TABLET ORAL EVERY 6 HOURS
Status: DISCONTINUED | OUTPATIENT
Start: 2020-12-01 | End: 2020-12-03 | Stop reason: HOSPADM

## 2020-12-01 RX ORDER — NALBUPHINE HYDROCHLORIDE 20 MG/ML
2.5-5 INJECTION, SOLUTION INTRAMUSCULAR; INTRAVENOUS; SUBCUTANEOUS EVERY 6 HOURS PRN
Status: DISCONTINUED | OUTPATIENT
Start: 2020-12-01 | End: 2020-12-01

## 2020-12-01 RX ORDER — KETOROLAC TROMETHAMINE 30 MG/ML
30 INJECTION, SOLUTION INTRAMUSCULAR; INTRAVENOUS EVERY 6 HOURS
Status: DISPENSED | OUTPATIENT
Start: 2020-12-01 | End: 2020-12-02

## 2020-12-01 RX ADMIN — ACETAMINOPHEN 975 MG: 325 TABLET, FILM COATED ORAL at 10:48

## 2020-12-01 RX ADMIN — SODIUM CHLORIDE, POTASSIUM CHLORIDE, SODIUM LACTATE AND CALCIUM CHLORIDE: 600; 310; 30; 20 INJECTION, SOLUTION INTRAVENOUS at 09:05

## 2020-12-01 RX ADMIN — OXYTOCIN-SODIUM CHLORIDE 0.9% IV SOLN 30 UNIT/500ML 100 ML: 30-0.9/5 SOLUTION at 09:31

## 2020-12-01 RX ADMIN — SODIUM CHLORIDE, SODIUM LACTATE, POTASSIUM CHLORIDE, CALCIUM CHLORIDE, AND DEXTROSE MONOHYDRATE: 600; 310; 30; 20; 5 INJECTION, SOLUTION INTRAVENOUS at 19:20

## 2020-12-01 RX ADMIN — ACETAMINOPHEN 975 MG: 325 TABLET, FILM COATED ORAL at 22:35

## 2020-12-01 RX ADMIN — CEFAZOLIN SODIUM 2 G: 2 INJECTION, SOLUTION INTRAVENOUS at 09:05

## 2020-12-01 RX ADMIN — PHENYLEPHRINE HYDROCHLORIDE 0.2 MCG/KG/MIN: 10 INJECTION INTRAVENOUS at 09:17

## 2020-12-01 RX ADMIN — SODIUM CITRATE AND CITRIC ACID MONOHYDRATE 30 ML: 500; 334 SOLUTION ORAL at 08:48

## 2020-12-01 RX ADMIN — ACETAMINOPHEN 975 MG: 325 TABLET, FILM COATED ORAL at 16:36

## 2020-12-01 RX ADMIN — ONDANSETRON 4 MG: 2 INJECTION INTRAMUSCULAR; INTRAVENOUS at 09:24

## 2020-12-01 RX ADMIN — SODIUM CHLORIDE, POTASSIUM CHLORIDE, SODIUM LACTATE AND CALCIUM CHLORIDE: 600; 310; 30; 20 INJECTION, SOLUTION INTRAVENOUS at 09:40

## 2020-12-01 RX ADMIN — KETOROLAC TROMETHAMINE 30 MG: 30 INJECTION, SOLUTION INTRAMUSCULAR at 11:41

## 2020-12-01 RX ADMIN — EPHEDRINE SULFATE 10 MG: 50 INJECTION, SOLUTION INTRAVENOUS at 09:19

## 2020-12-01 RX ADMIN — Medication 0.15 MG: at 09:08

## 2020-12-01 RX ADMIN — SODIUM CHLORIDE, POTASSIUM CHLORIDE, SODIUM LACTATE AND CALCIUM CHLORIDE: 600; 310; 30; 20 INJECTION, SOLUTION INTRAVENOUS at 07:19

## 2020-12-01 RX ADMIN — KETOROLAC TROMETHAMINE 30 MG: 30 INJECTION, SOLUTION INTRAMUSCULAR at 23:56

## 2020-12-01 RX ADMIN — FENTANYL CITRATE 20 MCG: 50 INJECTION, SOLUTION INTRAMUSCULAR; INTRAVENOUS at 09:08

## 2020-12-01 RX ADMIN — KETOROLAC TROMETHAMINE 30 MG: 30 INJECTION, SOLUTION INTRAMUSCULAR at 17:44

## 2020-12-01 RX ADMIN — Medication 100 ML/HR: at 14:15

## 2020-12-01 RX ADMIN — OXYTOCIN-SODIUM CHLORIDE 0.9% IV SOLN 30 UNIT/500ML 100 ML: 30-0.9/5 SOLUTION at 09:58

## 2020-12-01 RX ADMIN — PHENYLEPHRINE HYDROCHLORIDE 100 MCG: 10 INJECTION INTRAVENOUS at 10:02

## 2020-12-01 RX ADMIN — BUPIVACAINE HYDROCHLORIDE IN DEXTROSE 13.5 MG: 7.5 INJECTION, SOLUTION SUBARACHNOID at 09:08

## 2020-12-01 ASSESSMENT — ACTIVITIES OF DAILY LIVING (ADL)
CONCENTRATING,_REMEMBERING_OR_MAKING_DECISIONS_DIFFICULTY: NO
TOILETING_ISSUES: NO
DRESSING/BATHING_DIFFICULTY: NO
WALKING_OR_CLIMBING_STAIRS_DIFFICULTY: NO
FALL_HISTORY_WITHIN_LAST_SIX_MONTHS: NO
DIFFICULTY_EATING/SWALLOWING: NO
DIFFICULTY_COMMUNICATING: NO
WEAR_GLASSES_OR_BLIND: NO
DOING_ERRANDS_INDEPENDENTLY_DIFFICULTY: NO

## 2020-12-01 ASSESSMENT — MIFFLIN-ST. JEOR: SCORE: 1339.17

## 2020-12-01 NOTE — PLAN OF CARE
@ 39 3/7 weeks here for scheduled repeat c section.  Patient states she has felt baby moving and denies LOF or VB.  VTX by [place] - EFM applied & explained.  See Doc flow sheets for assessment.  MD notified and orders received. Patient prepared for a repeat c section and delivered a viable male at 930 with apgars 9 & 9.   PACU recovery uneventful with stable VS & fundus 1 below U with scant rubra.  Patient is tolerating oral fluids.     Data: Yary Muñoz transferred to [facility] via cart at 1330. Baby transferred via parent's arms.  Action: Receiving unit notified of transfer: Yes. Patient and family notified of room change. Report given to Rere at 1505. Belongings sent to receiving unit. Accompanied by Registered Nurse. Oriented patient to surroundings. Call light within reach. ID bands double-checked with Rocky MASON  Response: Patient tolerated transfer and is stable.

## 2020-12-01 NOTE — ANESTHESIA PREPROCEDURE EVALUATION
Anesthesia Pre-Procedure Evaluation    Patient: Yary Muñoz   MRN: [id number] : 1990          Preoperative Diagnosis: History of  section [Z98.891]    Procedure(s):  REPEAT  SECTION    Past Medical History:   Diagnosis Date     Hypothyroid     irregular period, weight gain, family history     NO ACTIVE PROBLEMS      Past Surgical History:   Procedure Laterality Date      SECTION N/A 2019    Procedure:  SECTION;  Surgeon: Enrique Delgado MD;  Location: [facility] OR     HC TOOTH EXTRACTION W/FORCEP       none       WISDOM ST GUIDEWIRE       Anesthesia Evaluation     . Pt has had prior anesthetic.            ROS/MED HX    ENT/Pulmonary:  - neg pulmonary ROS    (-) sleep apnea   Neurologic:       Cardiovascular:  - neg cardiovascular ROS       METS/Exercise Tolerance:     Hematologic:         Musculoskeletal:         GI/Hepatic:        (-) GERD   Renal/Genitourinary:         Endo:     (+) thyroid problem hypothyroidism, .      Psychiatric:         Infectious Disease:         Malignancy:         Other:                          Physical Exam      Airway   Mallampati: III  TM distance: >3 FB  Neck ROM: full    Dental     Cardiovascular   Rhythm and rate: regular and normal      Pulmonary    breath sounds clear to auscultation            Lab Results   Component Value Date    WBC 10.2 2020    HGB 13.2 2020    HCT 38.3 2020     2020    TSH 3.72 2020    T4 1.24 2020       Preop Vitals  BP Readings from Last 3 Encounters:   20 130/78   20 106/68   20 118/68    Pulse Readings from Last 3 Encounters:   19 98   19 115   18 66      Resp Readings from Last 3 Encounters:   19 12   19 18   07/10/12 14    SpO2 Readings from Last 3 Encounters:   20 94%   19 95%   01/10/18 99%      Temp Readings from Last 1 Encounters:   20 98  F (36.7  C) (Oral)    Ht Readings from Last 1  "Encounters:   12/01/20 1.549 m (5' 1\")      Wt Readings from Last 1 Encounters:   12/01/20 66.7 kg (147 lb)    Estimated body mass index is 27.78 kg/m  as calculated from the following:    Height as of this encounter: 1.549 m (5' 1\").    Weight as of this encounter: 66.7 kg (147 lb).       Anesthesia Plan      History & Physical Review  History and physical reviewed and following examination; no interval change.    ASA Status:  2 .    NPO Status:  > 8 hours    Plan for Spinal   PONV prophylaxis:  Ondansetron (or other 5HT-3) and Dexamethasone or Solumedrol         Postoperative Care  Postoperative pain management:  IV analgesics, Oral pain medications, Neuraxial analgesia and Multi-modal analgesia.      Consents  Anesthetic plan, risks, benefits and alternatives discussed with:  Patient..                 Eleuterio Steve MD                    .  "

## 2020-12-01 NOTE — L&D DELIVERY NOTE
OB  Delivery Note      Yary Muñoz MRN# [id number]   Age: 29 year old YOB: 1990       GA: 39w3d  GP:   Labor Complications: None   EBL:   mL  Delivery QBL:    Delivery Type: , Low Transverse   ROM to Delivery Time: (Delivered) Minutes: 2     1 Minute 5 Minute 10 Minute   Apgar Totals: 9   9        Personel Present:       Details    Pre-Op Diagnosis: 1. Intrauterine pregnancy at 39w3d  2. Repeat  section    Post-Op Diagnosis: 1. SAME   Indications:      Procedure:   , Low Transverse  via   incision   Anesthesia: Spinal      Informed Consent:  The risks, benefits, complications, and alternatives were discussed with the patient. The patient understood that the risks of  section include, but are not limited to: injury to nearby structures or organs, infection, blood loss and possible need for transfusion, and potential need for more surgery including hysterectomy. The patient stated understanding and desired to proceed. All questions were answered. The site of surgery was properly noted and marked. The patient was identified as Yary Muñoz and the procedure verified as a  delivery. A Time Out was held and the above information confirmed.    Procedure Details: see operative note     Angelito Muñoz-Yary [[id number]]    Labor Length    3rd Stage (hrs): 0 (min): 1      Rupture date/time: 20 09   Fluid color: Clear     Delivery/Placenta Date and Time    Delivery Date: 20 Delivery Time:  9:30 AM   Placenta Date/Time: 2020  9:32 AM  Oxytocin given at the time of delivery: after delivery of placenta     Apgars    Living status: Living   1 Minute 5 Minute 10 Minute 15 Minute 20 Minute   Skin color: 1  1       Heart rate: 2  2       Reflex irritability: 2  2       Muscle tone: 2  2       Respiratory effort: 2  2       Total: 9  9       Apgars assigned by: KELLY RNC     Cord    Vessels: 3 Vessels Complications:  "None   Cord Blood Disposition: Lab Gases Sent?: No      [place] Resuscitation    Methods: None      Measurements    Weight: 8 lb 4.6 oz Length: 1' 9\"   Head circumference: 36.8 cm       Skin to Skin and Feeding Plan    Skin to skin initiation date/time:     Skin to skin end date/time:     How do you plan to feed your baby: Breastfeeding     Labor Events and Shoulder Dystocia    Fetal Tracing Prior to Delivery: Category 1  Shoulder dystocia present?: Neg     Delivery (Maternal) (Provider to Complete) ([id number])    Episiotomy: None  Perineal lacerations: None       Blood Loss  Mother: Yary Muñoz #[id number]   Start of Mother's Information    IO Blood Loss  20 0923 - 20 0959    Total Surgical QBL Blood Loss (mL) Hospital Encounter 690 mL    Total  690 mL         End of Mother's Information  Mother: Yary Muñoz #[id number]          Delivery - Provider to Complete ([id number])    Delivering clinician: Neris Nur DO  Delivery Type (Choose the 1 that will go to the Birth History): , Low Transverse                    Specifics: Repeat   Indications for Repeat: 39+ weeks /Planned repeat                 Placenta    Delayed Cord Clamping: Done  Date/Time: 2020  9:32 AM  Removal: Spontaneous  Disposition: Hospital disposal           Anesthesia    Method: Spinal                Presentation and Position    Presentation: Vertex    Position: Left Occiput Transverse                 Neris Nur DO  "

## 2020-12-01 NOTE — H&P
[facility] Labor and Delivery History and Physical    Rebekah Romero MRN# [id number]   Age: 29 year old YOB: 1990     Date of Admission:  2020    Primary care provider: Rubi [place]           Chief Complaint:   Rebekah Romero is a 29 year old female who is 39w3d pregnant and being admitted for repeat .          Pregnancy history:     OBSTETRIC HISTORY:    OB History    Para Term  AB Living   2 2 2 0 0 2   SAB TAB Ectopic Multiple Live Births   0 0 0 0 2      # Outcome Date GA Lbr Edilson/2nd Weight Sex Delivery Anes PTL Lv   2 Term 20 39w3d  3.76 kg (8 lb 4.6 oz) M CS-LTranv Spinal  GRACE      Name: CHARLENE ROMERO-REBEKAH      Apgar1: 9  Apgar5: 9   1 Term 19 41w6d 10:00 / 06:59 3.742 kg (8 lb 4 oz) M CS-LTranv EPI N GRACE      Complications: Failure to Progress in Second Stage      Name: Chivo      Apgar1: 6  Apgar5: 9       EDC: Estimated Date of Delivery: Data Unavailable    Prenatal Labs:   Lab Results   Component Value Date    ABO A 2020    RH Pos 2020    AS Neg 2020    HEPBANG Nonreactive 2020    CHPCRT Negative 2020    GCPCRT Negative 2020    HGB 13.2 2020       GBS Status:   Lab Results   Component Value Date    GBS Negative 2020       Active Problem List  Patient Active Problem List   Diagnosis     Encounter for supervision of normal first pregnancy     Hypothyroidism due to Hashimoto's thyroiditis     History of  section     Previous  delivery, antepartum condition or complication     Hypothyroidism affecting pregnancy in third trimester       Medication Prior to Admission  Medications Prior to Admission   Medication Sig Dispense Refill Last Dose     levothyroxine (SYNTHROID/LEVOTHROID) 137 MCG tablet 1 tab by mouth 6 days a week and 1.5 tab one day a week 30 tablet 0 2020 at Unknown time     Omega-3 Fatty Acids (FISH OIL PO)    Past Week at Unknown time      "Prenatal Vit-Fe Fumarate-FA (PRENATAL MULTIVITAMIN PLUS IRON) 27-0.8 MG TABS per tablet Take 1 tablet by mouth daily 100 tablet 3 11/30/2020 at Unknown time     levothyroxine (SYNTHROID/LEVOTHROID) 112 MCG tablet Take 1 tab six days per week and 1.5 tabs one day each week (Patient not taking: Reported on 11/27/2020) 34 tablet 1      Misc. Devices (BREAST PUMP) MISC 1 each as needed (as needed for expressing breastmilk) (Patient not taking: Reported on 11/12/2020) 1 each 0    .        Maternal Past Medical History:     Past Medical History:   Diagnosis Date     Hypothyroid 2013    irregular period, weight gain, family history     NO ACTIVE PROBLEMS                        Family History:   This patient has no significant family history            Social History:   This patient has no significant social history         Review of Systems:   CONSTITUTIONAL: NEGATIVE for fever, chills, change in weight  INTEGUMENTARY/SKIN: NEGATIVE for worrisome rashes, moles or lesions  EYES: NEGATIVE for vision changes or irritation  ENT/MOUTH: NEGATIVE for ear, mouth and throat problems  RESP: NEGATIVE for significant cough or SOB  BREAST: NEGATIVE for masses, tenderness or discharge  CV: NEGATIVE for chest pain, palpitations or peripheral edema  GI: NEGATIVE for nausea, abdominal pain, heartburn, or change in bowel habits  : NEGATIVE for frequency, dysuria, or hematuria  MUSCULOSKELETAL: NEGATIVE for significant arthralgias or myalgia  NEURO: NEGATIVE for weakness, dizziness or paresthesias  ENDOCRINE: NEGATIVE for temperature intolerance, skin/hair changes  HEME: NEGATIVE for bleeding problems  PSYCHIATRIC: NEGATIVE for changes in mood or affect          Physical Exam:     Vitals were reviewed  Patient Vitals for the past 8 hrs:   BP Temp Temp src SpO2 Height Weight   12/01/20 0714 130/78 98  F (36.7  C) Oral 94 % -- --   12/01/20 0713 -- -- -- -- 1.549 m (5' 1\") 66.7 kg (147 lb)     Constitutional: Awake, alert, cooperative, no " apparent distress, and appears stated age.  Eyes: Lids and lashes normal, pupils equal, round and reactive to light, extra ocular muscles intact, sclera clear, conjunctiva normal.  ENT: Normocephalic, without obvious abnormality, atramatic, sinuses nontender on palpation, external ears without lesions, oral pharynx with moist mucus membranes, tonsils without erythema or exudates, gums normal and good dentition.  Neck: Supple, symmetrical, trachea midline, no adenopathy, thyroid symmetric, not enlarged and no tenderness, skin normal.  Hematologic / Lymphatic: No cervical lymphadenopathy and no supraclavicular lymphadenopathy.  Back: Symmetric, no curvature, spinous processes are non-tender on palpation, paraspinous muscles are non-tender on palpation, no costal vertebral tenderness.  Lungs: No increased work of breathing, good air exchange, clear to auscultation bilaterally, no crackles or wheezing.  Cardiovascular: Regular rate and rhythm, normal S1 and S2, no S3 or S4, and no murmur noted.  Abdomen: scars noted pfannenstiel incision   Musculoskeletal: No redness, warmth, or swelling of the joints.  Full range of motion noted.  Motor strength is 5 out of 5 all extremities bilaterally.  Tone is normal.  Neurologic: Awake, alert, oriented to name, place and time.  Cranial nerves II-XII are grossly intact.  Motor is 5 out of 5 bilaterally.  Cerebellar finger to nose, heel to shin intact.  Sensory is intact.  Babinski down going, Romberg negative, and gait is normal.  Neuropsychiatric: Normal affect, mood, orientation, memory and insight.  Skin: No rashes, erythema, pallor, petechia or purpura.   Cervix: deferred   Presentation:Cephalic  Fetal Heart Rate Tracing: Tier 1 (normal)  Tocometer: none                       Assessment:   Yary Muñoz is a 29 year old female who is 39w3d pregnant and being admitted for repeat .          Plan:   Admit - see IP orders  Prepare for  section    Neris Rosenbaum  Liudmila, DO

## 2020-12-01 NOTE — ANESTHESIA POSTPROCEDURE EVALUATION
Patient: Yary Muñoz    Procedure(s):  REPEAT  SECTION    Diagnosis:History of  section [Z98.891]  Diagnosis Additional Information: No value filed.    Anesthesia Type:  Spinal    Note:  Anesthesia Post Evaluation    Patient location during evaluation: Bedside  Patient participation: Able to fully participate in evaluation  Level of consciousness: awake  Pain management: adequate  Airway patency: patent  Cardiovascular status: acceptable  Respiratory status: acceptable  Hydration status: acceptable  PONV: controlled     Anesthetic complications: None          Last vitals:  Vitals:    20 1024 20 1032 20 1034   BP: 116/65  117/69   Resp:   14   Temp:      SpO2:  96%          Electronically Signed By: Eleuterio Steve MD  2020  10:48 AM

## 2020-12-01 NOTE — ANESTHESIA CARE TRANSFER NOTE
Patient: Yary Muñoz    Procedure(s):  REPEAT  SECTION    Diagnosis: History of  section [Z98.891]  Diagnosis Additional Information: No value filed.    Anesthesia Type:   Spinal     Note:  Airway :Room Air  Patient transferred to:Labor and Delivery  Comments: VSS. Report to RN.      Vitals: (Last set prior to Anesthesia Care Transfer)    CRNA VITALS  2020 0934 - 2020 1013      2020             Pulse:  68    SpO2:  99 %                Electronically Signed By: CHALINO Lundberg CRNA  2020  10:13 AM

## 2020-12-01 NOTE — OP NOTE
PREOPERATIVE DIAGNOSIS: A 29 year old-year-old  at 39w3d weeks estimated gestational age admitted for repeat  section.  POSTOPERATIVE DIAGNOSES: A 29 year old-year-old  at 39w3d weeks estimated gestational age admitted for repeat  section.PROCEDURE:   1. Repeat low transverse  section.   2. Seprafilm placement for adhesiolysis.   COMPLICATIONS: None apparent at time of procedure.   ESTIMATED BLOOD LOSS: 690 mL.   SURGEON: Neris Nur DO.   Assistant:  Isatu Jewell SA  INDICATIONS: A 29 year old-year-old  at 39w3d weeks estimated gestational age admitted for repeat  section.  Patient signed consent for repeat  section, risks benefits, alternatives are discussed with the patient.    OUTCOME: male infant 8# 4.6 oz apgars 9 at 1 minute and 9 at 5 minutes.   Findings:   Normal uterus, tubes and ovaries.   DETAILS OF PROCEDURE: After informed consent was signed, the patient was placed in dorsal supine position, spinal placed for anesthesia. Fetal heart tones were obtained and found to be in 150s. The patient was prepped and draped in normal sterile fashion and a time out was performed. Adequate anesthesia was reported per patient after the spinal was dosed to a surgical level.  The previous scar is removed via the normal fashion due to presence of keloid.  A pfannensteil incision was then carried down to the underlying fascia and incised in the midline. The fascia is dissected off the rectus muscles superiorly and inferiorly. Blunt/sharp dissection of the peritoneum was performed and sharp dissection and blunt stretching of the muscles was perfomed. The uterus was visualized.  An ana retractor is placed. The peritoneum was visualized, bladder blade was placed, The vesicouterine peritoneum incised and a bladder flap created. The bladder blade was replaced. The lower uterine segment was incised with a scalpel. Blunt stretching was performed and the  baby delivered atraumatically.  The nose and mouth were bulb suctioned. The cord was clamped and cut after 1 minute.  Infant was taken over to the waiting  staff. The placenta spontaneously delivered. The uterus was cleared of all clots and debris. The placenta was examined and found to be complete.  The lower uterine segment was reapproximated with 0 Monocryl in a running locked fashion. A second layer of the same suture was used for imbrication. The bladder flap was recreated using 3-0 Monocryl. The ana retractor is removed. Irrigation was performed. Seprafilm was placed over the bladder flap. Hemostasis was noted. The peritoneum was closed with 3-0 monocryl. The rectus muscles were reapproximated in the midline and closed with several horizontal mattress sutures of 3-0 monocryl  Hemostasis was assured with Bovie cautery on the rectus muscles, and Seprafilm was placed over it. The fascia was reapproximated with 0 looped PDS in a running fashion with good reapproximation. A subcutaneous suture using 3-0 monocryl was placed in interrupted fashion to re-approximate the tissue.  A subcuticular stitch using 4-0 Monocryl was used to reapproximate the skin. Steri-Strips, telfa, and tegaderm was applied. The patient tolerated the procedure well. Sponge, lap and needle counts were correct x2.   JON COOMBS DO

## 2020-12-02 LAB — HGB BLD-MCNC: 11.1 G/DL (ref 11.7–15.7)

## 2020-12-02 PROCEDURE — 120N000001 HC R&B MED SURG/OB

## 2020-12-02 PROCEDURE — 250N000011 HC RX IP 250 OP 636: Performed by: FAMILY MEDICINE

## 2020-12-02 PROCEDURE — 85018 HEMOGLOBIN: CPT | Performed by: FAMILY MEDICINE

## 2020-12-02 PROCEDURE — 36415 COLL VENOUS BLD VENIPUNCTURE: CPT | Performed by: FAMILY MEDICINE

## 2020-12-02 PROCEDURE — 250N000013 HC RX MED GY IP 250 OP 250 PS 637: Performed by: FAMILY MEDICINE

## 2020-12-02 RX ADMIN — ACETAMINOPHEN 975 MG: 325 TABLET, FILM COATED ORAL at 12:14

## 2020-12-02 RX ADMIN — ACETAMINOPHEN 975 MG: 325 TABLET, FILM COATED ORAL at 18:36

## 2020-12-02 RX ADMIN — KETOROLAC TROMETHAMINE 30 MG: 30 INJECTION, SOLUTION INTRAMUSCULAR at 06:01

## 2020-12-02 RX ADMIN — IBUPROFEN 800 MG: 800 TABLET ORAL at 12:14

## 2020-12-02 RX ADMIN — DOCUSATE SODIUM 50 MG AND SENNOSIDES 8.6 MG 1 TABLET: 8.6; 5 TABLET, FILM COATED ORAL at 09:20

## 2020-12-02 RX ADMIN — ACETAMINOPHEN 975 MG: 325 TABLET, FILM COATED ORAL at 04:16

## 2020-12-02 RX ADMIN — IBUPROFEN 800 MG: 800 TABLET ORAL at 18:36

## 2020-12-02 NOTE — PROGRESS NOTES
[facility]    Obstetrics Post-Op / Progress Note    Assessment & Plan   Assessment:  -1 Day Post-Op  Procedure(s):  REPEAT  SECTION    Doing well.  No immediate surgical complications identified.  No excessive bleeding  Urinary retention postop    Plan:  Ambulation encouraged  Pain control measures as needed  Reportable signs and symptoms dicussed with the patient  Continue to encourage voiding, I&O cath as needed.  Will shower as needed.    Lizeth Melchor MD    Interval History   Doing well.  Pain is well-controlled.  No fevers.  No history of wound drainage, warmth or significant erythema.  Good appetite.  Denies chest pain, shortness of breath, nausea or vomiting.  Ambulatory.  Breastfeeding well.    Medications     dextrose 5% lactated ringers Stopped (20 0020)     lactated ringers Stopped (20 1149)     NO Rho (D) immune globulin (RhoGam) needed - mother Rh POSITIVE       ORAL Pain Medications - may administer as ordered by surgeon for take home use       oxytocin in 0.9% NaCl Stopped (20 1920)     oxytocin in 0.9% NaCl         acetaminophen  650 mg Rectal Once     acetaminophen  975 mg Oral Q6H     ibuprofen  800 mg Oral Q6H     ketorolac  30 mg Intravenous Q6H     Measles, Mumps & Rubella Vac  0.5 mL Subcutaneous Once     senna-docusate  1 tablet Oral BID    Or     senna-docusate  2 tablet Oral BID     sodium chloride (PF)  3 mL Intracatheter Q8H     Tdap (tetanus-diphtheria-acell pertussis)  0.5 mL Intramuscular Once       Physical Exam   Temp: 98  F (36.7  C) Temp src: Oral BP: 107/71 Pulse: 66   Resp: 16 SpO2: 100 %      Vitals:    20 0713   Weight: 66.7 kg (147 lb)     Vital Signs with Ranges  Temp:  [98  F (36.7  C)-98.4  F (36.9  C)] 98  F (36.7  C)  Pulse:  [61-73] 66  Resp:  [10-16] 16  BP: (101-132)/(55-73) 107/71  SpO2:  [96 %-100 %] 100 %  I/O last 3 completed shifts:  In: 3536 [P.O.:120; I.V.:3416]  Out: 2640 [Urine:1950;  Blood:690]    Uterine fundus is firm, non-tender and at the level of the umbilicus but deviated to the patient's right.  Incision C/D/I  Extremities Non-tender    Data   Recent Labs   Lab Test 11/30/20  1247   ABO A   RH Pos   AS Neg     Recent Labs   Lab Test 12/02/20  0647 11/30/20  1247   HGB 11.1* 13.2     Recent Labs   Lab Test 04/23/20  1239   RUQIGG 86

## 2020-12-02 NOTE — PLAN OF CARE
Patients mobililty level scored using the bedside mobility assistance tool (BMAT). Patient is at a mobility level test number: 3. Mobility equipment used: none required. Required assist of 1 staff members. Further use of BMAT scoring required.

## 2020-12-02 NOTE — PLAN OF CARE
Vital signs stable on room air. Bonding well with infant. Breastfeeding well with minimal assistance from staff. Independent with cares for self and infant. Voiding spontaneously. Pain adequately controled with ibuprofen and tylenol. Tolerating a regular diet.  at bedside and supportive.

## 2020-12-02 NOTE — PLAN OF CARE
VSS, fundus firm at -1U, scant flow, pain managed with pain meds, breastfeeding done independently and going well, pt tolerating clears and may advance soon to regular diet,  supportive and attentive to pt's needs.

## 2020-12-02 NOTE — LACTATION NOTE
This note was copied from a baby's chart.  LC visit. Infant has been nursing well, however has a little difficulty opening his mouth wide per Yary's report.  She is getting into the shower but will call today for LC assistance to work on better latching.

## 2020-12-02 NOTE — PLAN OF CARE
Patient vital signs stable and meeting expected outcomes.  Breastfeeding well with minimal assistance from staff.  Up independently and encouraged to walk around room.  Bell catheter removed at 2030.  Patient unable to void at 0020 and straight cathed for 850 ml.  Encouraged patient to drink more water and ambulate.  Tried to void again at 0415 and was also unable to void.  Straight cath for 500 ml.  Pain well controlled with tylenol and toradol.  Able to perform all cares for self and infant.  Bonding well with baby.   present and supportive at bedside.  Will continue to monitor.

## 2020-12-02 NOTE — LACTATION NOTE
This note was copied from a baby's chart.  LC visit. Infant has been nursing well but occasionally slides to the end of the nipple.  Yary has been using a cradle hold. LC suggested that she use a cross cradle hold with breast support and bring her baby further onto the latch.  Infant's lips were naturally flanged outward for a deeper latch and Yary reported an increase in comfort.  She has an abundant volume of colostrum and infant gulps when nursing.  No issues noted.

## 2020-12-03 VITALS
SYSTOLIC BLOOD PRESSURE: 112 MMHG | HEIGHT: 61 IN | WEIGHT: 147 LBS | RESPIRATION RATE: 18 BRPM | TEMPERATURE: 98.4 F | BODY MASS INDEX: 27.75 KG/M2 | DIASTOLIC BLOOD PRESSURE: 70 MMHG | OXYGEN SATURATION: 100 % | HEART RATE: 78 BPM

## 2020-12-03 PROCEDURE — 250N000013 HC RX MED GY IP 250 OP 250 PS 637: Performed by: FAMILY MEDICINE

## 2020-12-03 RX ORDER — IBUPROFEN 800 MG/1
800 TABLET, FILM COATED ORAL EVERY 6 HOURS
Qty: 30 TABLET | Refills: 1 | Status: SHIPPED | OUTPATIENT
Start: 2020-12-03 | End: 2021-06-15

## 2020-12-03 RX ORDER — OXYCODONE HYDROCHLORIDE 5 MG/1
5 TABLET ORAL EVERY 4 HOURS PRN
Qty: 10 TABLET | Refills: 0 | Status: SHIPPED | OUTPATIENT
Start: 2020-12-03 | End: 2021-06-15

## 2020-12-03 RX ORDER — ACETAMINOPHEN 650 MG/1
650 SUPPOSITORY RECTAL ONCE
Qty: 1 SUPPOSITORY | Refills: 0 | Status: SHIPPED | OUTPATIENT
Start: 2020-12-03 | End: 2020-12-03

## 2020-12-03 RX ADMIN — IBUPROFEN 800 MG: 800 TABLET ORAL at 12:19

## 2020-12-03 RX ADMIN — IBUPROFEN 800 MG: 800 TABLET ORAL at 00:40

## 2020-12-03 RX ADMIN — ACETAMINOPHEN 975 MG: 325 TABLET, FILM COATED ORAL at 00:40

## 2020-12-03 RX ADMIN — ACETAMINOPHEN 975 MG: 325 TABLET, FILM COATED ORAL at 12:19

## 2020-12-03 RX ADMIN — IBUPROFEN 800 MG: 800 TABLET ORAL at 06:19

## 2020-12-03 RX ADMIN — ACETAMINOPHEN 975 MG: 325 TABLET, FILM COATED ORAL at 06:19

## 2020-12-03 RX ADMIN — DOCUSATE SODIUM 50 MG AND SENNOSIDES 8.6 MG 2 TABLET: 8.6; 5 TABLET, FILM COATED ORAL at 11:00

## 2020-12-03 NOTE — DISCHARGE INSTRUCTIONS
Postop  Birth Instructions    Please follow up with your OB/GYN provider in 6 weeks.  [facility]: [phone]    Activity       Do not lift more than 10 pounds for 6 weeks after surgery.  Ask family and friends for help when you need it.    No driving until you have stopped taking your pain medications (usually two weeks after surgery).    No heavy exercise or activity for 6 weeks.  Don't do anything that will put a strain on your surgery site.    Don't strain when using the toilet.  Your care team may prescribe a stool softener if you have problems with your bowel movements.     To care for your incision:       Keep the incision clean and dry.    Do not soak your incision in water. No swimming or hot tubs until it has fully healed. You may soak in the bathtub if the water level is below your incision.    Do not use peroxide, gel, cream, lotion, or ointment on your incision.    Adjust your clothes to avoid pressure on your surgery site (check the elastic in your underwear for example).     You may see a small amount of clear or pink drainage and this is normal.  Check with your health care provider:       If the drainage increases or has an odor.    If the incision reddens, you have swelling, or develop a rash.    If you have increased pain and the medicine we prescribed doesn't help.    If you have a fever above 100.4 F (38 C) with or without chills when placing thermometer under your tongue.   The area around your incision (surgery wound), will feel numb.  This is normal. The numbness should go away in less than a year.     Keep your hands clean:  Always wash your hands before touching your incision (surgery wound). This helps reduce your risk of infection. If your hands aren't dirty, you may use an alcohol hand-rub to clean your hands. Keep your nails clean and short.    Call your healthcare provider if you have any of these symptoms:       You soak a sanitary pad with blood within 1 hour, or you  see blood clots larger than a golf ball.    Bleeding that lasts more than 6 weeks.    Vaginal discharge that smells bad.    Severe pain, cramping or tenderness in your lower belly area.    A need to urinate more frequently (use the toilet more often), more urgently (use the toilet very quickly), or it burns when you urinate.    Nausea and vomiting.    Redness, swelling or pain around a vein in your leg.    Problems breastfeeding or a red or painful area on your breast.    Chest pain and cough or are gasping for air.    Problems with coping with sadness, anxiety or depression. If you have concerns about hurting yourself or the baby, call your provider immediately.      You have questions or concerns after you return home.

## 2020-12-03 NOTE — PLAN OF CARE
Pt able to get periods of rest between cares w/  rooming-in for night. States ordered pain medications keeping her comfortable.  Able to void w/o difficulty.  FOB present and helpful w/ cares.

## 2020-12-03 NOTE — DISCHARGE SUMMARY
DELIVERY DISCHARGE SUMMARY    Admit date: 2020  Discharge date: 12/3/2020     Admit Dx:   - 29 year old  at 39w3d   History of  section [Z98.891]    Discharge Dx:  - Same as above, s/p  delivery    Procedures:  -  delivery    Admit HPI:  Yary Muñoz is a 29 year old female who is 39w3d pregnant and being admitted for repeat .    Please see her admit H&P for full details of her PMH, PSH, Meds, Allergies and exam on admit.    Hospital course:     OUTCOME: male infant 8# 4.6 oz apgars 9 at 1 minute and 9 at 5 minutes.   Findings:   Normal uterus, tubes and ovaries.   Please see her  Section Operative Note for full details regarding her delivery.    Her postoperative course was complicated by nothing.  At the time of discharge, she was meeting all of her postpartum goals and deemed stable for discharge. She was voiding without difficulty, tolerating a regular diet without nausea and vomiting, her pain was well controlled on oral pain medicines and her lochia was appropriate. She was hemodynamically stable at the time of discharge.      Physical exam on the day of discharge:  Vitals:    20 0827 20 1545 20 0047 20 0833   BP: 107/71 115/64 117/71 112/70   Pulse: 66 71 75 78   Resp: 16 18 18 18   Temp: 98  F (36.7  C) 97.9  F (36.6  C) 97.7  F (36.5  C) 98.4  F (36.9  C)   TempSrc: Oral Oral Oral Oral   SpO2:       Weight:       Height:           General: sitting up, alert and cooperative  Abd: soft, non-distended, non-tender. Fundus firm, nontender, 2 cm below umbilicus.   Incision clean/dry/intact  Extremities: calves nontender, trace edema of lower extremities bilaterally    Lab Results   Component Value Date    HGB 11.1 2020    HGB 13.2 2020     Blood type:   Lab Results   Component Value Date    [facility] Pos 2020       Discharge/Disposition:  Ms. Yary Muñoz was discharged to home in stable condition with  the following instructions/medications:  1) Call for temperature > 100.4, foul smelling vaginal discharge, bleeding > 1 pad per hour x 2 hrs, pain not controlled by oral pain meds, thoughts of self-harm or harming the infant.  2) Contraception counseling was provided.  3) She was instructed to follow-up with her primary OB in 6 weeks for a routine postpartum visit, and in 1 week for a blood pressure check if having any blood pressure issues while hospitalized.  4) She was instructed to continue her PNV on discharge if she wished to breast feed her infant.  5) She was discharged home with the following medications: ibuprofen, tylenol, and roxicodone      Sissy Patel MD, MPH  [facility]

## 2020-12-03 NOTE — PLAN OF CARE
Data: Vital signs within normal limits. Postpartum checks within normal limits - see flow record. Patient eating and drinking normally. Patient able to empty bladder independently and is up ambulating. No apparent signs of infection. Incision healing well. Patient performing self cares and is able to care for infant.  Action: Patient medicated during the shift for pain. See MAR. Patient reassessed within 1 hour after each medication and pain was improved - patient stated she was comfortable. Patient education done about discharge and infant cares. See flow record.  Response: Positive attachment behaviors observed with infant. Support persons are present.     Discharge instructions discussed and all questions and concerns addressed. Pt declines a breast pump at this time. Home care referral sent r/t early discharge. Pt declining prescribed pain meds, she says she has ibuprofen and tylenol at home and did not take the oxycodone with her last .    Pt discharged with infant and  to home in private transportation at 1251.

## 2020-12-03 NOTE — PLAN OF CARE
Data: Vital signs within normal limits. Postpartum checks within normal limits - see flow record. Patient eating and drinking normally. Patient able to empty bladder independently and is up ambulating. No apparent signs of infection. Incision healing well steri strips intact. Patient performing self cares and is able to care for infant.  Action: Patient medicated during the shift for pain and cramping. See MAR. Patient reassessed within 1 hour after each medication and pain was improved - patient stated she was comfortable. Patient education done about post partum cramping. See flow record.  Response: Positive attachment behaviors observed with infant. Support person present.   Plan: Anticipate discharge on POD 2 or 3.

## 2020-12-09 DIAGNOSIS — E03.9 HYPOTHYROIDISM DURING PREGNANCY IN FIRST TRIMESTER: ICD-10-CM

## 2020-12-09 DIAGNOSIS — O99.281 HYPOTHYROIDISM DURING PREGNANCY IN FIRST TRIMESTER: ICD-10-CM

## 2020-12-09 RX ORDER — LEVOTHYROXINE SODIUM 137 UG/1
TABLET ORAL
Qty: 30 TABLET | Refills: 0 | Status: CANCELLED | OUTPATIENT
Start: 2020-12-09

## 2020-12-17 DIAGNOSIS — E03.9 HYPOTHYROIDISM DURING PREGNANCY IN FIRST TRIMESTER: ICD-10-CM

## 2020-12-17 DIAGNOSIS — O99.281 HYPOTHYROIDISM DURING PREGNANCY IN FIRST TRIMESTER: ICD-10-CM

## 2020-12-17 RX ORDER — LEVOTHYROXINE SODIUM 137 UG/1
TABLET ORAL
Qty: 30 TABLET | Refills: 0 | OUTPATIENT
Start: 2020-12-17

## 2020-12-24 RX ORDER — LEVOTHYROXINE SODIUM 112 UG/1
TABLET ORAL
Qty: 90 TABLET | Refills: 0 | Status: SHIPPED | OUTPATIENT
Start: 2020-12-24 | End: 2021-06-15 | Stop reason: DRUGHIGH

## 2020-12-24 RX ORDER — LEVOTHYROXINE SODIUM 137 UG/1
TABLET ORAL
Qty: 30 TABLET | Refills: 0 | Status: SHIPPED
Start: 2020-12-24 | End: 2020-12-24

## 2020-12-24 NOTE — TELEPHONE ENCOUNTER
Please call - Make sure Yary decreased her levothyroxine dose back to 112 mcg/day.  I denied this refill request for levothyroxine 137 mcg tabs because this was her dose while pregnant and since her  was early this month, she should have decreased levothyroxine back to her prepregnant dose of 112 mcg/day.  I sent a new RX to her pharmacy for the 112 mcg tabs.  She should also schedule a follow up with endo in 6-8 weeks or follow up with her PCP for thyroid labs and additional levothyroxine refills.  Thanks,  Azalea Burk NP  Endocrinology

## 2020-12-30 DIAGNOSIS — E03.9 HYPOTHYROIDISM DURING PREGNANCY IN FIRST TRIMESTER: ICD-10-CM

## 2020-12-30 DIAGNOSIS — O99.281 HYPOTHYROIDISM DURING PREGNANCY IN FIRST TRIMESTER: ICD-10-CM

## 2020-12-30 NOTE — TELEPHONE ENCOUNTER
Per Pharmacy:  Missing information on Rx.  Signature.  Order needs to be signed to be valid.  Please resend.

## 2020-12-31 RX ORDER — LEVOTHYROXINE SODIUM 112 UG/1
TABLET ORAL
Qty: 90 TABLET | Refills: 0 | OUTPATIENT
Start: 2020-12-31

## 2021-01-15 ENCOUNTER — HEALTH MAINTENANCE LETTER (OUTPATIENT)
Age: 31
End: 2021-01-15

## 2021-01-19 ENCOUNTER — PRENATAL OFFICE VISIT (OUTPATIENT)
Dept: OBGYN | Facility: CLINIC | Age: 31
End: 2021-01-19
Payer: COMMERCIAL

## 2021-01-19 VITALS
BODY MASS INDEX: 23.56 KG/M2 | SYSTOLIC BLOOD PRESSURE: 100 MMHG | WEIGHT: 124.8 LBS | HEIGHT: 61 IN | DIASTOLIC BLOOD PRESSURE: 60 MMHG

## 2021-01-19 DIAGNOSIS — Z30.011 ENCOUNTER FOR INITIAL PRESCRIPTION OF CONTRACEPTIVE PILLS: Primary | ICD-10-CM

## 2021-01-19 DIAGNOSIS — E03.9 HYPOTHYROIDISM, UNSPECIFIED TYPE: ICD-10-CM

## 2021-01-19 PROBLEM — O34.219 PREVIOUS CESAREAN DELIVERY, ANTEPARTUM CONDITION OR COMPLICATION: Status: RESOLVED | Noted: 2020-08-28 | Resolved: 2021-01-19

## 2021-01-19 PROBLEM — O99.283 HYPOTHYROIDISM AFFECTING PREGNANCY IN THIRD TRIMESTER: Status: RESOLVED | Noted: 2020-08-28 | Resolved: 2021-01-19

## 2021-01-19 LAB
ERYTHROCYTE [DISTWIDTH] IN BLOOD BY AUTOMATED COUNT: 12.7 % (ref 10–15)
HCT VFR BLD AUTO: 41.8 % (ref 35–47)
HGB BLD-MCNC: 14 G/DL (ref 11.7–15.7)
MCH RBC QN AUTO: 29.5 PG (ref 26.5–33)
MCHC RBC AUTO-ENTMCNC: 33.5 G/DL (ref 31.5–36.5)
MCV RBC AUTO: 88 FL (ref 78–100)
PLATELET # BLD AUTO: 281 10E9/L (ref 150–450)
RBC # BLD AUTO: 4.75 10E12/L (ref 3.8–5.2)
WBC # BLD AUTO: 7.2 10E9/L (ref 4–11)

## 2021-01-19 PROCEDURE — 84439 ASSAY OF FREE THYROXINE: CPT | Performed by: FAMILY MEDICINE

## 2021-01-19 PROCEDURE — 99207 PR POST PARTUM EXAM: CPT | Performed by: FAMILY MEDICINE

## 2021-01-19 PROCEDURE — 36415 COLL VENOUS BLD VENIPUNCTURE: CPT | Performed by: FAMILY MEDICINE

## 2021-01-19 PROCEDURE — 84443 ASSAY THYROID STIM HORMONE: CPT | Performed by: FAMILY MEDICINE

## 2021-01-19 PROCEDURE — 85027 COMPLETE CBC AUTOMATED: CPT | Performed by: FAMILY MEDICINE

## 2021-01-19 RX ORDER — ACETAMINOPHEN AND CODEINE PHOSPHATE 120; 12 MG/5ML; MG/5ML
0.35 SOLUTION ORAL DAILY
Qty: 90 TABLET | Refills: 3 | Status: SHIPPED | OUTPATIENT
Start: 2021-01-19 | End: 2021-12-13

## 2021-01-19 ASSESSMENT — PATIENT HEALTH QUESTIONNAIRE - PHQ9: SUM OF ALL RESPONSES TO PHQ QUESTIONS 1-9: 1

## 2021-01-19 ASSESSMENT — MIFFLIN-ST. JEOR: SCORE: 1233.47

## 2021-01-19 NOTE — PATIENT INSTRUCTIONS
Return yearly     Labs today     Dr. Neris Nur, DO    Obstetrics and Gynecology  [facility] [place] [facility] [place]

## 2021-01-19 NOTE — NURSING NOTE
"Chief Complaint   Patient presents with     Post Partum Exam      20--pap not due--would like mini pill for birth control--pt is breastfeeding       Initial /60   Ht 1.549 m (5' 1\")   Wt 56.6 kg (124 lb 12.8 oz)   LMP 02/10/2020   BMI 23.58 kg/m   Estimated body mass index is 23.58 kg/m  as calculated from the following:    Height as of this encounter: 1.549 m (5' 1\").    Weight as of this encounter: 56.6 kg (124 lb 12.8 oz).  BP completed using cuff size: regular    Questioned patient about current smoking habits.  Pt. has never smoked.          The following HM Due: NONE         "

## 2021-01-19 NOTE — PROGRESS NOTES
"SUBJECTIVE: Yary is a  here for a 6-week postpartum checkup.    Delivery date was 2020. She had a repeat c/s of a viable boy, weight 8 pounds 4.6 oz., with no complications.  Since delivery, she has been breast feeding.  She has no signs of infection, bleeding or other complications.  She is not pregnant.  We discussed contraceptions and she has chosen mini pill / progesterone only pill.  She  has had protected intercourse since delivery and complains of mild discomfort. Patient screened for postpartum depression and complaints are NEGATIVE. Screening has also been completed for intimate partner violence.    EXAM:  Today's Depression Rating was   PHQ-9 SCORE 2021   PHQ-9 Total Score 1       /60   Ht 1.549 m (5' 1\")   Wt 56.6 kg (124 lb 12.8 oz)   LMP 02/10/2020   BMI 23.58 kg/m     GENERAL healthy, alert and no distress  EYES EOMI, intact visual fields, PERRL and funduscopic deferred  HENT: Normocephalic. TM's grossly normal, oropharynx without significant findings.  NECK: no adenopathy, no asymmetry, masses, or scars and thyroid normal to palpation  RESP: Clear to auscultation  BREASTS: NEGATIVE  CV: NEGATIVE  LYMPH: NEGATIVE  GI: aorta normal and bowel sounds normal  : no hernia detected  GYN PELVIC: NEGATIVE, normal external genitalia, normal groin lymphatics, normal urethral meatus, normal vaginal mucosa, normal cervix and normal adnexa, no masses or tenderness  MS: NEGATIVE, Extremities normal. No deformaties, edema or skin discoloration.  SKIN: no ulcers, lesions or rash  NEURO: alert/oriented to person, location and time, CN 2-12 intact, brisk, symmetric reflexes at knees and biceps b/l, strength 5/5 throughout and symmetric, sensation to light touch grossly intact throughout  PSYCH: NEGATIVE    Discussed risks and benefits of contraception options in detail including oral contraceptive pills, injection, IUD, ring and sterilization. Patient elects POP.  No contraindications " noted.       ASSESSMENT:   Normal postpartum exam after repeat c/s.  Rx POP    PLAN:  Return as needed or at time of next expected pap, pelvic, or breast exam.  Check tsh and cbc    Dr. Neris Nur, DO    Obstetrics and Gynecology  [facility] [place] [facility] [place]

## 2021-01-20 LAB
T4 FREE SERPL-MCNC: 1.36 NG/DL (ref 0.76–1.46)
TSH SERPL DL<=0.005 MIU/L-ACNC: 0.19 MU/L (ref 0.4–4)

## 2021-02-02 ENCOUNTER — MEDICAL CORRESPONDENCE (OUTPATIENT)
Dept: HEALTH INFORMATION MANAGEMENT | Facility: CLINIC | Age: 31
End: 2021-02-02

## 2021-04-07 ENCOUNTER — MEDICAL CORRESPONDENCE (OUTPATIENT)
Dept: HEALTH INFORMATION MANAGEMENT | Facility: CLINIC | Age: 31
End: 2021-04-07

## 2021-06-15 ENCOUNTER — OFFICE VISIT (OUTPATIENT)
Dept: OBGYN | Facility: CLINIC | Age: 31
End: 2021-06-15
Payer: COMMERCIAL

## 2021-06-15 VITALS
SYSTOLIC BLOOD PRESSURE: 96 MMHG | DIASTOLIC BLOOD PRESSURE: 62 MMHG | WEIGHT: 123.5 LBS | HEIGHT: 61 IN | BODY MASS INDEX: 23.32 KG/M2

## 2021-06-15 DIAGNOSIS — E03.9 HYPOTHYROIDISM, UNSPECIFIED TYPE: Primary | ICD-10-CM

## 2021-06-15 PROCEDURE — 36415 COLL VENOUS BLD VENIPUNCTURE: CPT | Performed by: ADVANCED PRACTICE MIDWIFE

## 2021-06-15 PROCEDURE — 99213 OFFICE O/P EST LOW 20 MIN: CPT | Performed by: ADVANCED PRACTICE MIDWIFE

## 2021-06-15 PROCEDURE — 84439 ASSAY OF FREE THYROXINE: CPT | Performed by: ADVANCED PRACTICE MIDWIFE

## 2021-06-15 PROCEDURE — 84443 ASSAY THYROID STIM HORMONE: CPT | Performed by: ADVANCED PRACTICE MIDWIFE

## 2021-06-15 RX ORDER — LEVOTHYROXINE SODIUM 100 UG/1
100 TABLET ORAL DAILY
Qty: 30 TABLET | Refills: 1 | Status: SHIPPED | OUTPATIENT
Start: 2021-06-15 | End: 2022-06-22 | Stop reason: DRUGHIGH

## 2021-06-15 ASSESSMENT — MIFFLIN-ST. JEOR: SCORE: 1227.57

## 2021-06-15 NOTE — PROGRESS NOTES
"  SUBJECTIVE:                                                   Yary Muñoz is a 30 year old who presents to clinic today for the following health issue(s):  Patient presents with:  Thyroid Disease: Recheck Thyroid      HPI:  Yary presents today for follow up hypothyroidism. She reports that the person in endocrinology that she had been seeing left. She needs a refill of her medication (currently taking 100mcg) and a check of her TSH. She feels well on her current dose.     No LMP recorded (lmp unknown). (Menstrual status: Breast Feeding).  Last PHQ-9 score on record =   PHQ-9 SCORE 2021   PHQ-9 Total Score 1     Last GAD7 score on record = No flowsheet data found.      Problem list and histories reviewed & adjusted, as indicated.  Additional history: as documented.    Patient Active Problem List   Diagnosis     Encounter for supervision of normal first pregnancy     Hypothyroidism due to Hashimoto's thyroiditis     History of  section     Past Surgical History:   Procedure Laterality Date      SECTION N/A 2019    Procedure:  SECTION;  Surgeon: Enrique Delgado MD;  Location: [facility] OR      SECTION N/A 2020    Procedure: REPEAT  SECTION;  Surgeon: Neris Nur DO;  Location: [facility] L+D     HC TOOTH EXTRACTION W/FORCEP       none       WISDOM ST GUIDEWIRE        Social History     Tobacco Use     Smoking status: Never Smoker     Smokeless tobacco: Never Used   Substance Use Topics     Alcohol use: No      Problem (# of Occurrences) Relation (Name,Age of Onset)    Breast Cancer (2) Other: great aunt, Maternal Aunt (39): early 40\"s    Cancer (2) Maternal Grandfather (69): had wagoners disease and  from lung cancer, Paternal Grandfather (51):  from skin cancer    Cerebrovascular Disease (2) Maternal Grandmother (70), Maternal Grandfather (50)    Diabetes (2) Maternal Grandmother (60): type 2, Maternal Grandfather (60): type 2    Hypertension (2) " "Maternal Grandmother, Maternal Grandfather    Hypothyroidism (1) Brother    Lipids (2) Maternal Grandmother, Maternal Grandfather    Other Cancer (2) Maternal Grandfather: lung, Paternal Grandfather: skin    Thyroid Disease (4) Mother: hypothyroid, Father: hypothyroid, Maternal Grandmother, Paternal Grandmother            Current Outpatient Medications   Medication Sig     levothyroxine (SYNTHROID/LEVOTHROID) 100 MCG tablet Take 1 tablet (100 mcg) by mouth daily     norethindrone (MICRONOR) 0.35 MG tablet Take 1 tablet (0.35 mg) by mouth daily     Omega-3 Fatty Acids (FISH OIL PO)      Prenatal Vit-Fe Fumarate-FA (PRENATAL MULTIVITAMIN PLUS IRON) 27-0.8 MG TABS per tablet Take 1 tablet by mouth daily     No current facility-administered medications for this visit.      Allergies   Allergen Reactions     No Known Drug Allergies        ROS:  CONSTITUTIONAL: NEGATIVE for fever, chills, change in weight  GI: NEGATIVE for nausea, abdominal pain, heartburn, or change in bowel habits  : NEGATIVE for unusual urinary or vaginal symptoms. Periods are regular.  NEURO: NEGATIVE for weakness, dizziness or paresthesias  ENDOCRINE: NEGATIVE for temperature intolerance, skin/hair changes  PSYCHIATRIC: NEGATIVE for changes in mood or affect    OBJECTIVE:     BP 96/62 (BP Location: Right arm, Cuff Size: Adult Regular)   Ht 1.549 m (5' 1\")   Wt 56 kg (123 lb 8 oz)   LMP  (LMP Unknown)   Breastfeeding Yes   BMI 23.34 kg/m    Body mass index is 23.34 kg/m .    PHYSICAL EXAM:  Constitutional:  Appearance: Well nourished, well developed alert, in no acute distress  Neck:  Lymph Nodes:  No lymphadenopathy present; Thyroid:  Gland size normal, nontender, no nodules or masses present on palpation  Chest:  Respiratory Effort:  Breathing unlabored.   Neurologic:  Mental Status:  Oriented X3.  Normal strength and tone, sensory exam grossly normal, mentation intact and speech normal.    Psychiatric:  Mentation appears normal and affect " normal/bright.     In-Clinic Test Results:  No results found for this or any previous visit (from the past 24 hour(s)).    ASSESSMENT/PLAN:                                                        ICD-10-CM    1. Hypothyroidism, unspecified type  E03.9 TSH with free T4 reflex     levothyroxine (SYNTHROID/LEVOTHROID) 100 MCG tablet       PLAN:    Filled current dose of levothyroxine for one month. If euthyroid on current dose, will fill for next year. Encouraged patient to call with any questions or concerns.      CHALINO Kuo, CNM

## 2021-06-15 NOTE — NURSING NOTE
"Chief Complaint   Patient presents with     Thyroid Disease     Recheck Thyroid       Initial BP 96/62 (BP Location: Right arm, Cuff Size: Adult Regular)   Ht 1.549 m (5' 1\")   Wt 56 kg (123 lb 8 oz)   LMP  (LMP Unknown)   Breastfeeding Yes   BMI 23.34 kg/m   Estimated body mass index is 23.34 kg/m  as calculated from the following:    Height as of this encounter: 1.549 m (5' 1\").    Weight as of this encounter: 56 kg (123 lb 8 oz).  BP completed using cuff size: regular    Questioned patient about current smoking habits.  Pt. has never smoked.          The following HM Due: NONE         "

## 2021-06-16 ENCOUNTER — TELEPHONE (OUTPATIENT)
Dept: OBGYN | Facility: CLINIC | Age: 31
End: 2021-06-16

## 2021-06-16 DIAGNOSIS — E03.9 HYPOTHYROIDISM, UNSPECIFIED TYPE: Primary | ICD-10-CM

## 2021-06-16 LAB
T4 FREE SERPL-MCNC: 0.92 NG/DL (ref 0.76–1.46)
TSH SERPL DL<=0.005 MIU/L-ACNC: 20.5 MU/L (ref 0.4–4)

## 2021-06-16 RX ORDER — LEVOTHYROXINE SODIUM 112 UG/1
112 TABLET ORAL DAILY
Qty: 60 TABLET | Refills: 0 | Status: SHIPPED | OUTPATIENT
Start: 2021-06-16 | End: 2021-06-17

## 2021-06-16 NOTE — TELEPHONE ENCOUNTER
Attempted to call patient regarding lab result. Left message for patient to call back. Her TSH is elevated and her levothyroxine needs to be adjusted. She is currently taking 100mcg, and needs to go up to 112mcg.    I have ordered her additional medication to adjust her dose.    We need to recheck her TSH in 4 weeks. I have put in a future order.     Thank you,    CHALINO Kuo, JERICA

## 2021-06-17 RX ORDER — LEVOTHYROXINE SODIUM 112 UG/1
112 TABLET ORAL DAILY
Qty: 90 TABLET | Refills: 0 | Status: SHIPPED | OUTPATIENT
Start: 2021-06-17 | End: 2021-09-10

## 2021-06-17 NOTE — TELEPHONE ENCOUNTER
Request from pharmacy for 90 day supply of synthroid received.  Insurance only covers 90 days.    OK to change to 90 days?    I did also send the pt a mychart with the information below about labs.    Lluvia Alexis RN

## 2021-08-26 ENCOUNTER — LAB (OUTPATIENT)
Dept: LAB | Facility: CLINIC | Age: 31
End: 2021-08-26
Payer: COMMERCIAL

## 2021-08-26 DIAGNOSIS — E03.9 HYPOTHYROIDISM, UNSPECIFIED TYPE: ICD-10-CM

## 2021-08-26 PROCEDURE — 36415 COLL VENOUS BLD VENIPUNCTURE: CPT

## 2021-08-26 PROCEDURE — 84443 ASSAY THYROID STIM HORMONE: CPT

## 2021-08-27 LAB — TSH SERPL DL<=0.005 MIU/L-ACNC: 1.97 MU/L (ref 0.4–4)

## 2021-09-10 DIAGNOSIS — E03.9 HYPOTHYROIDISM, UNSPECIFIED TYPE: ICD-10-CM

## 2021-09-10 RX ORDER — LEVOTHYROXINE SODIUM 112 UG/1
112 TABLET ORAL DAILY
Qty: 90 TABLET | Refills: 1 | Status: SHIPPED | OUTPATIENT
Start: 2021-09-10 | End: 2022-03-01

## 2021-09-10 NOTE — TELEPHONE ENCOUNTER
levothyroxine      Last Written Prescription Date:  6/17/21  Last Fill Quantity: 90,   # refills: 0  Last Office Visit: 6/15/21  Future Office visit:

## 2021-10-24 ENCOUNTER — HEALTH MAINTENANCE LETTER (OUTPATIENT)
Age: 31
End: 2021-10-24

## 2021-12-11 DIAGNOSIS — Z30.011 ENCOUNTER FOR INITIAL PRESCRIPTION OF CONTRACEPTIVE PILLS: ICD-10-CM

## 2021-12-13 RX ORDER — NORETHINDRONE 0.35 MG/1
TABLET ORAL
Qty: 84 TABLET | Refills: 0 | Status: SHIPPED | OUTPATIENT
Start: 2021-12-13 | End: 2022-06-22

## 2021-12-13 NOTE — TELEPHONE ENCOUNTER
Prescription approved per [facility] Refill Protocol.  Pt is due for her annual exam 1/2022.  Jocelyn MARQUEZ RN

## 2022-03-01 DIAGNOSIS — Z30.011 ENCOUNTER FOR INITIAL PRESCRIPTION OF CONTRACEPTIVE PILLS: ICD-10-CM

## 2022-03-01 DIAGNOSIS — E03.9 HYPOTHYROIDISM, UNSPECIFIED TYPE: ICD-10-CM

## 2022-03-01 RX ORDER — LEVOTHYROXINE SODIUM 112 UG/1
112 TABLET ORAL DAILY
Qty: 90 TABLET | Refills: 1 | Status: SHIPPED | OUTPATIENT
Start: 2022-03-01 | End: 2022-08-24

## 2022-03-01 NOTE — TELEPHONE ENCOUNTER
Levothyroxine 112 MCG Tablet      Last Written Prescription Date:  9/10/2021  Last Fill Quantity: 90,   # refills: 1  Last Office Visit: 6/15/2021  Future Office visit:   none    Nani Kapoor CMA

## 2022-03-01 NOTE — TELEPHONE ENCOUNTER
TSH   Date Value Ref Range Status   08/26/2021 1.97 0.40 - 4.00 mU/L Final   06/15/2021 20.50 (H) 0.40 - 4.00 mU/L Final     Prescription approved per [facility] Refill Protocol.  Jocelyn MARQUEZ RN

## 2022-03-01 NOTE — TELEPHONE ENCOUNTER
Pt is overdue for her annual exam.   Pt has already been given a courtesy refill.  My chart message sent to her advising to make an appt. Once an appt is made, we can approve the refill.  Jocelyn MARQUEZ RN

## 2022-03-14 RX ORDER — NORETHINDRONE 0.35 MG/1
TABLET ORAL
Qty: 84 TABLET | Refills: 0 | OUTPATIENT
Start: 2022-03-14

## 2022-04-10 ENCOUNTER — HEALTH MAINTENANCE LETTER (OUTPATIENT)
Age: 32
End: 2022-04-10

## 2022-06-22 ENCOUNTER — PRENATAL OFFICE VISIT (OUTPATIENT)
Dept: NURSING | Facility: CLINIC | Age: 32
End: 2022-06-22
Payer: COMMERCIAL

## 2022-06-22 DIAGNOSIS — Z34.80 PRENATAL CARE, SUBSEQUENT PREGNANCY, UNSPECIFIED TRIMESTER: Primary | ICD-10-CM

## 2022-06-22 PROCEDURE — 99207 PR NO CHARGE NURSE ONLY: CPT

## 2022-06-22 NOTE — PROGRESS NOTES
NPN nurse visit done over the phone. Pt will be given NPN folder and book at her upcoming appt.   Discussed optional screening available to assess chromosomal anomalies. Questions answered. Pt advised to call the clinic if she has any questions or concerns related to her pregnancy. Prenatal labs will be obtained at her upcoming appt. New prenatal visit scheduled on 6/29/22 with Dr Nur.    11w5d    Lab Results   Component Value Date    PAP NIL 04/09/2020           Patient supplied answers from flow sheet for:  Prenatal OB Questionnaire.  Past Medical History  Have you ever recieved care for your mental health? : No  Have you ever been in a major accident or suffered serious trauma?: (!) Yes  Within the last year, has anyone hit, slapped, kicked or otherwise hurt you?: No  In the last year, has anyone forced you to have sex when you didn't want to?: No    Past Medical History 2   Have you ever received a blood transfusion?: No  Would you accept a blood transfusion if was medically recommended?: Yes  Does anyone in your home smoke?: No   Is your blood type Rh negative?: Unknown  Have you ever ?: (!) Yes  Have you been hospitalized for a nonsurgical reason excluding normal delivery?: No  Have you ever had an abnormal pap smear?: No    Past Medical History (Continued)  Do you have a history of abnormalities of the uterus?: No  Did your mother take ANDIE or any other hormones when she was pregnant with you?: No  Do you have any other problems we have not asked about which you feel may be important to this pregnancy?: No                      Pt was at park 20-30 minutes PTA, running and \"tripped on tennis ball\" with obvious L forearm deformity. Per mom he \"broke this arm 3 months ago\". No meds given PTA

## 2022-06-28 ENCOUNTER — ANCILLARY PROCEDURE (OUTPATIENT)
Dept: ULTRASOUND IMAGING | Facility: CLINIC | Age: 32
End: 2022-06-28
Payer: COMMERCIAL

## 2022-06-28 ENCOUNTER — LAB (OUTPATIENT)
Dept: LAB | Facility: CLINIC | Age: 32
End: 2022-06-28

## 2022-06-28 DIAGNOSIS — Z34.80 PRENATAL CARE, SUBSEQUENT PREGNANCY, UNSPECIFIED TRIMESTER: ICD-10-CM

## 2022-06-28 LAB
ABO/RH(D): NORMAL
ANTIBODY SCREEN: NEGATIVE
ERYTHROCYTE [DISTWIDTH] IN BLOOD BY AUTOMATED COUNT: 12.6 % (ref 10–15)
HCT VFR BLD AUTO: 39.9 % (ref 35–47)
HGB BLD-MCNC: 13.8 G/DL (ref 11.7–15.7)
MCH RBC QN AUTO: 30.1 PG (ref 26.5–33)
MCHC RBC AUTO-ENTMCNC: 34.6 G/DL (ref 31.5–36.5)
MCV RBC AUTO: 87 FL (ref 78–100)
PLATELET # BLD AUTO: 233 10E3/UL (ref 150–450)
RBC # BLD AUTO: 4.58 10E6/UL (ref 3.8–5.2)
SPECIMEN EXPIRATION DATE: NORMAL
WBC # BLD AUTO: 6.9 10E3/UL (ref 4–11)

## 2022-06-28 PROCEDURE — 86780 TREPONEMA PALLIDUM: CPT

## 2022-06-28 PROCEDURE — 84439 ASSAY OF FREE THYROXINE: CPT

## 2022-06-28 PROCEDURE — 87389 HIV-1 AG W/HIV-1&-2 AB AG IA: CPT

## 2022-06-28 PROCEDURE — 84443 ASSAY THYROID STIM HORMONE: CPT

## 2022-06-28 PROCEDURE — 86850 RBC ANTIBODY SCREEN: CPT

## 2022-06-28 PROCEDURE — 86803 HEPATITIS C AB TEST: CPT

## 2022-06-28 PROCEDURE — 87086 URINE CULTURE/COLONY COUNT: CPT

## 2022-06-28 PROCEDURE — 85027 COMPLETE CBC AUTOMATED: CPT

## 2022-06-28 PROCEDURE — 86901 BLOOD TYPING SEROLOGIC RH(D): CPT

## 2022-06-28 PROCEDURE — 36415 COLL VENOUS BLD VENIPUNCTURE: CPT

## 2022-06-28 PROCEDURE — 76801 OB US < 14 WKS SINGLE FETUS: CPT | Performed by: OBSTETRICS & GYNECOLOGY

## 2022-06-28 PROCEDURE — 87340 HEPATITIS B SURFACE AG IA: CPT

## 2022-06-28 PROCEDURE — 86762 RUBELLA ANTIBODY: CPT

## 2022-06-28 PROCEDURE — 86900 BLOOD TYPING SEROLOGIC ABO: CPT

## 2022-06-29 ENCOUNTER — PRENATAL OFFICE VISIT (OUTPATIENT)
Dept: OBGYN | Facility: CLINIC | Age: 32
End: 2022-06-29
Payer: COMMERCIAL

## 2022-06-29 VITALS
WEIGHT: 115.7 LBS | BODY MASS INDEX: 21.84 KG/M2 | SYSTOLIC BLOOD PRESSURE: 98 MMHG | DIASTOLIC BLOOD PRESSURE: 60 MMHG | HEIGHT: 61 IN

## 2022-06-29 DIAGNOSIS — K59.00 CONSTIPATION, UNSPECIFIED CONSTIPATION TYPE: ICD-10-CM

## 2022-06-29 DIAGNOSIS — Z34.91 PRENATAL CARE IN FIRST TRIMESTER: Primary | ICD-10-CM

## 2022-06-29 DIAGNOSIS — O34.219 PREVIOUS CESAREAN DELIVERY, ANTEPARTUM CONDITION OR COMPLICATION: ICD-10-CM

## 2022-06-29 DIAGNOSIS — E03.9 HYPOTHYROIDISM, UNSPECIFIED TYPE: ICD-10-CM

## 2022-06-29 LAB
HBV SURFACE AG SERPL QL IA: NONREACTIVE
HCV AB SERPL QL IA: NONREACTIVE
HIV 1+2 AB+HIV1 P24 AG SERPL QL IA: NONREACTIVE
RUBV IGG SERPL QL IA: 9.66 INDEX
RUBV IGG SERPL QL IA: POSITIVE
T PALLIDUM AB SER QL: NONREACTIVE
T4 FREE SERPL-MCNC: 1.04 NG/DL (ref 0.76–1.46)
TSH SERPL DL<=0.005 MIU/L-ACNC: 4.71 MU/L (ref 0.4–4)

## 2022-06-29 PROCEDURE — 99207 PR FIRST OB VISIT: CPT | Performed by: FAMILY MEDICINE

## 2022-06-29 PROCEDURE — 87591 N.GONORRHOEAE DNA AMP PROB: CPT | Performed by: FAMILY MEDICINE

## 2022-06-29 PROCEDURE — 87491 CHLMYD TRACH DNA AMP PROBE: CPT | Performed by: FAMILY MEDICINE

## 2022-06-29 RX ORDER — AMOXICILLIN 250 MG
1 CAPSULE ORAL DAILY
Qty: 60 TABLET | Refills: 4 | Status: SHIPPED | OUTPATIENT
Start: 2022-06-29 | End: 2024-06-19

## 2022-06-29 NOTE — PROGRESS NOTES
Yary Muñoz is a 31 year old  @ 11w3d with Estimated Date of Delivery: Loy 15, 2023 who presents to the clinic for an new ob visit.         Estimated Date of Delivery: Estimated Date of Delivery: Loy 15, 2023    Reviewed nurse intake visit on previously  H/o Chicken Pox or Varicella Vaccination: Yes     History of GDM: no   Hx PTL : no   History of HTN in pregnancy: no   Shoulder dystocia: no   Vacuum Extraction: no   PPH: no   3rd of 4th degree laceration: no   Other complications: hashimotos,  section     PERSONAL HISTORY  Exercise Habits:  normal   Employment:   Her job involves no activity with no potential for toxic exposure.    Travel plans: no  Diet: normal   Prenatal vitamins? yes  Fish Oil? no  Abuse concerns? no  Any history if abuse, varbal, physical, sexual? no    Hgb A1c screen:  BMI > 30: , 1st degree family DM:  no, History of GDM: no, PCOS: no, High risk ethnicity: no     Low Dose Aspirin for Preeclampsia Prevention:  Consider for those with 1 high risk or 2  moderate risk factors     High risk: previous pregnancy with preeclampsia, multifetal gestation, chronic htn, diabetes, chronic kidney disease, autoimmune disorder     Medium risk: nulliparity, BMI >30, family hx preeclampsia, age >/= 35,  race, low SES, personal risk factors (hx low birth weight, hx stillbirth, >10yrs between pregnancies).   Teenage pregnancy.       Patient does not qualify for low dose aspirin therapy     Patient Active Problem List   Diagnosis     Encounter for supervision of normal first pregnancy     Hypothyroidism due to Hashimoto's thyroiditis     History of  section     Past Medical History:   Diagnosis Date     Hypothyroid     irregular period, weight gain, family history     Past Surgical History:   Procedure Laterality Date      SECTION N/A 2019    Procedure:  SECTION;  Surgeon: Enrique Delgado MD;  Location: [facility] OR      SECTION N/A  "2020    Procedure: REPEAT  SECTION;  Surgeon: Neris Nur DO;  Location: [facility] L+D     HC TOOTH EXTRACTION W/FORCEP       Current Outpatient Medications   Medication Sig Dispense Refill     levothyroxine (SYNTHROID/LEVOTHROID) 112 MCG tablet Take 1 tablet (112 mcg) by mouth daily 90 tablet 1     Prenatal Vit-Fe Fumarate-FA (PRENATAL MULTIVITAMIN PLUS IRON) 27-0.8 MG TABS per tablet Take 1 tablet by mouth daily 100 tablet 3         ====================================================  PERSONAL/SOCIAL HISTORY  Social History     Socioeconomic History     Marital status:      Spouse name: Woody     Number of children: 0     Years of education: None     Highest education level: None   Occupational History     Occupation: not working     Comment: [employer]   Tobacco Use     Smoking status: Never Smoker     Smokeless tobacco: Never Used   Vaping Use     Vaping Use: Never used   Substance and Sexual Activity     Alcohol use: No     Drug use: No     Sexual activity: Yes     Partners: Male   Social History Narrative    ** Merged History Encounter **          =====================================================   REVIEW OF SYSTEMS  ENT: NEGATIVE for ear, mouth and throat problems  CV: NEGATIVE for chest pain, palpitations or peripheral edema  GI: NEGATIVE for nausea, abdominal pain, heartburn, or change in bowel habits  : NEGATIVE for unusual urinary or vaginal symptoms. Periods are regular.  C: NEGATIVE for fever, chills, change in weight  I: NEGATIVE for worrisome rashes, moles or lesions  E: NEGATIVE for vision changes or irritation  R: NEGATIVE for significant cough or SOB  B: NEGATIVE for masses, tenderness or discharge  M: NEGATIVE for significant arthralgias or myalgia  N: NEGATIVE for weakness, dizziness or paresthesias  P: NEGATIVE for changes in mood or affect  ====================================================  PHYSICAL EXAM:  BP 98/60   Ht 1.549 m (5' 1\")   Wt 52.5 kg (115 lb 11.2 oz) "   LMP 2022   BMI 21.86 kg/m          GENERAL:  Pleasant pregnant female, alert, well groomed.  SKIN:  Warm and dry, without lesions or rashes  HEAD: Symmetrical features.  EYES:  PERRLA,   MOUTH:  Buccal mucosa pink, moist without lesions.    NECK:  Thyroid without enlargement and nodules.  Lymph nodes not palpable.   LUNGS:  Clear to auscultation.  BREAST:  Symmetrical.  No dominant, fixed or suspicious masses are noted.  No skin or nipple changes or axillary nodes.  Self exam is taught and encouraged.  Nipples everted.      HEART:  RRR without murmur.  ABDOMEN: Soft without masses , tenderness or organomegaly.  No CVA tenderness. No scars noted..   MUSCULOSKELETAL:  Full range of motion  EXTREMITIES:  No edema. No significant varicosities.   GENITALIA:  BUS WNL, no lesions noted   VAGINA:  Pink, normal rugae and discharge normal and physiologic,   CERVIX:  smooth, without discharge or CMT and parous os,   firm/ closed 4 cm long.  UTERUS: Anteverted, nontender 12 weeks in size.  ADNEXA: Without masses or tenderness  PELVIS:   Adequate, Pelvis proven to -pelvis not tested.    =========================================  ICSI Routine Prenatal [place]fe Guideline  ASSESSMENT/PLAN:  Yary Muñoz is a 31 year old  @ 11w3d with Estimated Date of Delivery: Loy 15, 2023 who presents to the clinic for an new ob visit.      1) Concerns:     Nausea getting better, no meds   Covid-19 concerns: covid infection and vaccination recommendations discussed.   2) Routine: Blood type A+ RI tdap [ ] flu [ ] GS [declined]   Covid v [v and b] GTT [ ]   3) Risk factors:    A: Hx of  section: RCS 2023   B: Hashimotos, hypothyroid:  tsh each monthly, synthroid 112 mcg    4) Problem list   Patient Active Problem List   Diagnosis     Encounter for supervision of normal first pregnancy     Hypothyroidism due to Hashimoto's thyroiditis     History of  section     5) EDUCATION :    RECOMMENDED WEIGHT  GAIN: 25-35 lbs.  Instructed on best evidence for: weight gain for her BMI for pregnancy; healthy diet and foods to avoid; exercise and activity during pregnancy;avoiding exposure to toxoplasmosis; and maintenance of a generally healthy lifestyle.   Discussed the harms, benefits, side effects and alternative therapies for current prescribed and OTC medications: patient previously given list of recommended medications.  6) Counseled about genetic screening tests (Innatal, preparent with options, discussed standard panel and other options, 1st trimester screen and targeted anatomy scan and AFP and quad screen if first trimester screening not done) and invasive definitive testing (chorionic villus sampling and genetic amniocentesis).  Patient wishes to undergo anatomy scan     7) Return: 4 weeks    Dr. Neris Nur, DO    OB/GYN   [facility]

## 2022-06-29 NOTE — PATIENT INSTRUCTIONS
"Return 4 weeks   Return to clinic:  every 4 weeks till 28 weeks, then every 2 weeks till 36 weeks, then weekly till delivery      Phone numbers [place]:  Day/ night [phone] ask for ob triage  Emergency:  Call labor and delivery:  [phone]    What should I call about??    Contraction every 5 minutes for 1 hour 1 minute long (511), bleeding, loss of fluid, headache that doesn't resolve with tylenol, and decreased fetal movement     Start kick counts @ 26-28 weeks   There is an carlito for this!  It is called \"count the kicks\"  Keep track of movement and discover your normal baby movement pattern   guideline is listed below  Please call if you do not feel the baby move!  We will have you come in for fetal heart rate monitoring:   Perception of at least 10 FMs during 12 hours of normal maternal activity   Perception of least 10 FMs over two hours when the mother is at rest and focused on [facility] Address   [address], [place], [place] [address] [phone]    Dr. Neris Nur, DO    OB/GYN   [facility]                                                    "

## 2022-06-29 NOTE — NURSING NOTE
"12w5d    Chief Complaint   Patient presents with     Prenatal Care       Initial BP 98/60   Ht 1.549 m (5' 1\")   Wt 52.5 kg (115 lb 11.2 oz)   LMP 2022   BMI 21.86 kg/m   Estimated body mass index is 21.86 kg/m  as calculated from the following:    Height as of this encounter: 1.549 m (5' 1\").    Weight as of this encounter: 52.5 kg (115 lb 11.2 oz).  BP completed using cuff size: regular    Questioned patient about current smoking habits.  Pt. has never smoked.          The following HM Due: Michael ()    Nani Kapoor CMA             "

## 2022-06-30 ENCOUNTER — TELEPHONE (OUTPATIENT)
Dept: OBGYN | Facility: CLINIC | Age: 32
End: 2022-06-30

## 2022-06-30 DIAGNOSIS — E03.9 HYPOTHYROIDISM, UNSPECIFIED TYPE: Primary | ICD-10-CM

## 2022-06-30 LAB — BACTERIA UR CULT: NORMAL

## 2022-06-30 RX ORDER — LEVOTHYROXINE SODIUM 150 UG/1
150 TABLET ORAL DAILY
Qty: 90 TABLET | Refills: 0 | Status: SHIPPED | OUTPATIENT
Start: 2022-06-30 | End: 2022-10-10

## 2022-06-30 NOTE — TELEPHONE ENCOUNTER
Type of surgery: repeat  section  Location of surgery: [facility]  Date and time of surgery: 23 @ 9:00 am  Surgeon: Dr. Nur  Pre-Op Appt Date: @ prenatal appoitment  Post-Op Appt Date:    Packet sent out: Yes  Pre-cert/Authorization completed:  No  Date: 22

## 2022-07-01 LAB
C TRACH DNA SPEC QL NAA+PROBE: NEGATIVE
N GONORRHOEA DNA SPEC QL NAA+PROBE: NEGATIVE

## 2022-07-29 ENCOUNTER — PRENATAL OFFICE VISIT (OUTPATIENT)
Dept: OBGYN | Facility: CLINIC | Age: 32
End: 2022-07-29
Payer: COMMERCIAL

## 2022-07-29 VITALS — SYSTOLIC BLOOD PRESSURE: 98 MMHG | DIASTOLIC BLOOD PRESSURE: 58 MMHG | WEIGHT: 119 LBS | BODY MASS INDEX: 22.48 KG/M2

## 2022-07-29 DIAGNOSIS — Z34.92 PRENATAL CARE IN SECOND TRIMESTER: ICD-10-CM

## 2022-07-29 DIAGNOSIS — O34.219 PREVIOUS CESAREAN DELIVERY, ANTEPARTUM CONDITION OR COMPLICATION: Primary | ICD-10-CM

## 2022-07-29 DIAGNOSIS — O28.3 ECHOGENIC INTRACARDIAC FOCUS OF FETUS ON PRENATAL ULTRASOUND: ICD-10-CM

## 2022-07-29 DIAGNOSIS — E03.9 HYPOTHYROIDISM, UNSPECIFIED TYPE: ICD-10-CM

## 2022-07-29 DIAGNOSIS — G93.0 CHOROID PLEXUS CYST: ICD-10-CM

## 2022-07-29 PROCEDURE — 84443 ASSAY THYROID STIM HORMONE: CPT | Performed by: FAMILY MEDICINE

## 2022-07-29 PROCEDURE — 99207 PR PRENATAL VISIT: CPT | Performed by: FAMILY MEDICINE

## 2022-07-29 PROCEDURE — 36415 COLL VENOUS BLD VENIPUNCTURE: CPT | Performed by: FAMILY MEDICINE

## 2022-07-29 NOTE — PROGRESS NOTES
CC: Here for routine prenatal visit   31 year old y/o  @ 15w5d with Estimated Date of Delivery: Loy 15, 2023     BP 98/58   Wt 54 kg (119 lb)   LMP 2022   BMI 22.48 kg/m       See OB flowsheet  + fetal movement, no contractions, no bleeding, no loss of fluid   Discussed monitoring fetal movement     1) concerns:   Covid-19 concerns: covid infection and vaccination recommendations discussed.   2) Routine: Blood type A+ RI tdap [ ] flu [ ] GS [declined]   Covid v [v and b] GTT [ ]   3) Risk factors:               A: Hx of  section: RCS 2023              B: Hashimotos, hypothyroid:  tsh each monthly, synthroid 112 mcg     4) Return: 4 weeks     [facility]

## 2022-07-29 NOTE — NURSING NOTE
"Chief Complaint   Patient presents with     Prenatal Care       Initial BP 98/58   Wt 54 kg (119 lb)   LMP 2022   BMI 22.48 kg/m   Estimated body mass index is 22.48 kg/m  as calculated from the following:    Height as of 22: 1.549 m (5' 1\").    Weight as of this encounter: 54 kg (119 lb).  BP completed using cuff size: regular    Questioned patient about current smoking habits.  Pt. has never smoked.          15w5d  - cramping or bleeding  ? FM noted  - headache or heartburn  Alma Mckeon LPN             "

## 2022-07-29 NOTE — PATIENT INSTRUCTIONS
"Return 4 weeks   Return to clinic:  every 4 weeks till 28 weeks, then every 2 weeks till 36 weeks, then weekly till delivery      Phone numbers [place]:  Day/ night [phone] ask for ob triage  Emergency:  Call labor and delivery:  [phone]    What should I call about??    Contraction every 5 minutes for 1 hour 1 minute long (511), bleeding, loss of fluid, headache that doesn't resolve with tylenol, and decreased fetal movement     Start kick counts @ 26-28 weeks   There is an carlito for this!  It is called \"count the kicks\"  Keep track of movement and discover your normal baby movement pattern   guideline is listed below  Please call if you do not feel the baby move!  We will have you come in for fetal heart rate monitoring:   Perception of at least 10 FMs during 12 hours of normal maternal activity   Perception of least 10 FMs over two hours when the mother is at rest and focused on [facility] Address   [address], [place], [place] [address] [phone]    Dr. Neris Nur, DO    OB/GYN   [facility]                                                    "

## 2022-07-30 LAB — TSH SERPL DL<=0.005 MIU/L-ACNC: 1.57 MU/L (ref 0.4–4)

## 2022-08-24 ENCOUNTER — PRENATAL OFFICE VISIT (OUTPATIENT)
Dept: OBGYN | Facility: CLINIC | Age: 32
End: 2022-08-24
Payer: COMMERCIAL

## 2022-08-24 VITALS
SYSTOLIC BLOOD PRESSURE: 110 MMHG | WEIGHT: 123 LBS | BODY MASS INDEX: 23.22 KG/M2 | DIASTOLIC BLOOD PRESSURE: 70 MMHG | HEIGHT: 61 IN

## 2022-08-24 DIAGNOSIS — E03.9 HYPOTHYROIDISM, UNSPECIFIED TYPE: ICD-10-CM

## 2022-08-24 DIAGNOSIS — Z34.92 PRENATAL CARE IN SECOND TRIMESTER: Primary | ICD-10-CM

## 2022-08-24 PROCEDURE — 36415 COLL VENOUS BLD VENIPUNCTURE: CPT | Performed by: FAMILY MEDICINE

## 2022-08-24 PROCEDURE — 84443 ASSAY THYROID STIM HORMONE: CPT | Performed by: FAMILY MEDICINE

## 2022-08-24 PROCEDURE — 99207 PR PRENATAL VISIT: CPT | Performed by: FAMILY MEDICINE

## 2022-08-24 NOTE — PATIENT INSTRUCTIONS
"Return 4 weeks   Return to clinic:  every 4 weeks till 28 weeks, then every 2 weeks till 36 weeks, then weekly till delivery      Phone numbers [place]:  Day/ night [phone] ask for ob triage  Emergency:  Call labor and delivery:  [phone]    What should I call about??    Contraction every 5 minutes for 1 hour 1 minute long (511), bleeding, loss of fluid, headache that doesn't resolve with tylenol, and decreased fetal movement     Start kick counts @ 26-28 weeks   There is an carlito for this!  It is called \"count the kicks\"  Keep track of movement and discover your normal baby movement pattern   guideline is listed below  Please call if you do not feel the baby move!  We will have you come in for fetal heart rate monitoring:   Perception of at least 10 FMs during 12 hours of normal maternal activity   Perception of least 10 FMs over two hours when the mother is at rest and focused on [facility] Address   [address], [place], [place] [address] [phone]    Dr. Neris Nur, DO    OB/GYN   [facility]                                                    "

## 2022-08-24 NOTE — PROGRESS NOTES
"CC: Here for routine prenatal visit   31 year old y/o  @ 19w3d with Estimated Date of Delivery: Loy 15, 2023     /70 (BP Location: Left arm, Patient Position: Chair, Cuff Size: Adult Regular)   Ht 1.549 m (5' 1\")   Wt 55.8 kg (123 lb)   LMP 2022   Breastfeeding No   BMI 23.24 kg/m    See OB flowsheet  + fetal movement, no contractions, no bleeding, no loss of fluid   Discussed monitoring fetal movement     1) concerns: doing well, us Friday.   Covid-19 concerns: covid infection and vaccination recommendations discussed.   2) Routine: Blood type A+ RI tdap [ ] flu [ ] GS [declined]   Covid v [v and b] GTT [ ]   3) Risk factors:               A: Hx of  section: RCS 2023              B: Hashimotos, hypothyroid:  tsh each monthly, synthroid 112 mcg   4) Return: 4 weeks     [place]     "

## 2022-08-24 NOTE — NURSING NOTE
"Chief Complaint   Patient presents with     Prenatal Care     19 3/7 weeks       Initial /70 (BP Location: Left arm, Patient Position: Chair, Cuff Size: Adult Regular)   Ht 1.549 m (5' 1\")   Wt 55.8 kg (123 lb)   LMP 2022   Breastfeeding No   BMI 23.24 kg/m   Estimated body mass index is 23.24 kg/m  as calculated from the following:    Height as of this encounter: 1.549 m (5' 1\").    Weight as of this encounter: 55.8 kg (123 lb).  BP completed using cuff size: regular    Questioned patient about current smoking habits.  Pt. has never smoked.          The following HM Due: NONE    +flutters    Liliya Soto CMA    "

## 2022-08-25 LAB — TSH SERPL DL<=0.005 MIU/L-ACNC: 1.51 MU/L (ref 0.4–4)

## 2022-08-26 ENCOUNTER — ANCILLARY PROCEDURE (OUTPATIENT)
Dept: ULTRASOUND IMAGING | Facility: CLINIC | Age: 32
End: 2022-08-26
Attending: FAMILY MEDICINE
Payer: COMMERCIAL

## 2022-08-26 DIAGNOSIS — O34.219 PREVIOUS CESAREAN DELIVERY, ANTEPARTUM CONDITION OR COMPLICATION: ICD-10-CM

## 2022-08-26 DIAGNOSIS — Z34.92 PRENATAL CARE IN SECOND TRIMESTER: ICD-10-CM

## 2022-08-26 PROCEDURE — 76805 OB US >/= 14 WKS SNGL FETUS: CPT | Performed by: FAMILY MEDICINE

## 2022-08-28 ENCOUNTER — TRANSCRIBE ORDERS (OUTPATIENT)
Dept: MATERNAL FETAL MEDICINE | Facility: CLINIC | Age: 32
End: 2022-08-28

## 2022-08-28 DIAGNOSIS — O26.90 PREGNANCY RELATED CONDITION, ANTEPARTUM: Primary | ICD-10-CM

## 2022-08-30 ENCOUNTER — PRE VISIT (OUTPATIENT)
Dept: MATERNAL FETAL MEDICINE | Facility: CLINIC | Age: 32
End: 2022-08-30

## 2022-08-31 ENCOUNTER — LAB (OUTPATIENT)
Dept: LAB | Facility: CLINIC | Age: 32
End: 2022-08-31
Attending: FAMILY MEDICINE
Payer: COMMERCIAL

## 2022-08-31 ENCOUNTER — HOSPITAL ENCOUNTER (OUTPATIENT)
Dept: ULTRASOUND IMAGING | Facility: CLINIC | Age: 32
Discharge: HOME | End: 2022-08-31
Attending: FAMILY MEDICINE
Payer: COMMERCIAL

## 2022-08-31 ENCOUNTER — OFFICE VISIT (OUTPATIENT)
Dept: MATERNAL FETAL MEDICINE | Facility: CLINIC | Age: 32
End: 2022-08-31
Attending: FAMILY MEDICINE
Payer: COMMERCIAL

## 2022-08-31 ENCOUNTER — OFFICE VISIT (OUTPATIENT)
Dept: MATERNAL FETAL MEDICINE | Facility: CLINIC | Age: 32
End: 2022-08-31
Attending: OBSTETRICS & GYNECOLOGY
Payer: COMMERCIAL

## 2022-08-31 DIAGNOSIS — O28.3 ABNORMAL PRENATAL ULTRASOUND: ICD-10-CM

## 2022-08-31 DIAGNOSIS — O28.3 ABNORMAL PRENATAL ULTRASOUND: Primary | ICD-10-CM

## 2022-08-31 DIAGNOSIS — O35.03X0 CHOROID PLEXUS CYST OF FETUS IN SINGLETON PREGNANCY: Primary | ICD-10-CM

## 2022-08-31 DIAGNOSIS — Z36.2 ENCOUNTER FOR FOLLOW-UP ULTRASOUND OF FETAL ANATOMY: ICD-10-CM

## 2022-08-31 DIAGNOSIS — O35.03X0 CHOROID PLEXUS CYST OF FETUS IN SINGLETON PREGNANCY: ICD-10-CM

## 2022-08-31 DIAGNOSIS — O26.90 PREGNANCY RELATED CONDITION, ANTEPARTUM: ICD-10-CM

## 2022-08-31 PROCEDURE — 76811 OB US DETAILED SNGL FETUS: CPT

## 2022-08-31 PROCEDURE — 36415 COLL VENOUS BLD VENIPUNCTURE: CPT

## 2022-08-31 PROCEDURE — 76811 OB US DETAILED SNGL FETUS: CPT | Mod: 26 | Performed by: OBSTETRICS & GYNECOLOGY

## 2022-08-31 PROCEDURE — 999N000069 HC STATISTIC GENETIC COUNSELING, < 16 MIN

## 2022-08-31 NOTE — PROGRESS NOTES
"Please see \"Imaging\" tab under \"Chart Review\" for details of today's US.    Kisha Rudd, DO    "

## 2022-08-31 NOTE — PROGRESS NOTES
[facility] [place]  Genetic Counseling Consult    Patient: Yary Muñoz YOB: 1990    Date of Service: 22       Yary Muñoz was seen at [facility] at the request of Dr. Rudd and Dr. Allison for an abbreviated genetic consultation to discuss the options for screening and testing for fetal chromosome abnormalities in light of choroid plexus cyst identified on today's ultrasound.  The patient was accompanied by her partner Woody to today's consult.       Impression/Plan:   1.  Yary elected to proceed with a blood draw for NIPT (NIPS test through [facility]) during today's consult in light of today's finding.  Results are expected within 7-10 days, and will be available in [facility].  We will contact her to discuss the results, and a copy will be forwarded to the office of the referring OB provider. In the event we are unable to contact the patient once results become available, she has requested we leave a detailed voicemail fully disclosing the results at her mobile telephone number. The patient declined sex chromosome aneuploidy analysis. She was informed that results will also be available via [facility].     2.  Yary had a comprehensive level II ultrasound today. Please see ultrasound report under the imaging tab for details.     3.   Yary declines diagnostic prenatal genetic testing (amniocentesis) during today's consult.     Pregnancy History:   /Parity:    Age at Delivery: 32 year old    RICARDA: 1/15/2023, by Ultrasound  Gestational Age: 20w3d      Yary's medical and pregnancy history was not reviewed in detail during today's abbreviated consult.        Risk Assessment for Chromosome Conditions:   Today's level II ultrasound at 20w3d identified choroid plexus cysts (CPC) which is a fluid filled cyst within a part of the brain that makes cerebrospinal fluid called the coroid plexus. Approximately 1% of  second trimester prenatal ultrasounds reveal a CPC. In isolation, the finding of a CPC are not thought to significantly increase aneuploidy risks. In the presence of other aneuploidy markers, CPC may be one feature of a condition, such as trisomy 18. However, it would be rare for a fetus with trisomy 18 to have only a CPC on ultrasound. Specifically, an isolated CPC is associated with a LR for trisomy 18 of <2. Based on Yary's mid-trimester age-related risk of trisomy 18 of 1 in 2525, this slightly increases the risk of trisomy 18 in her pregnancy to <1 in 1263 (<0.1%).     We discussed the options for more information including a cell-free DNA non-invasive screen (NIPT) or a diagnostic option such as an amniocentesis. We discussed that a screen would provide a more accurate risk assessment for this pregnancy by analyzing the cell-free DNA from the placenta in maternal blood. However, only an amniocentesis can provide diagnostic information by directly analyzing the chromosomes from fetal cells in the amniotic fluid.     After our discussion, Yary elected to proceed with NIPT today.        Testing Options:   We discussed the following options:   Non-invasive Prenatal Testing (NIPT)    Maternal plasma cell-free DNA testing; first trimester ultrasound with nuchal translucency and nasal bone assessment is recommended, when appropriate    Screens for fetal trisomy 21, trisomy 13, trisomy 18, and sex chromosome aneuploidy    Cannot screen for open neural tube defects; maternal serum AFP after 15 weeks is recommended     Genetic Amniocentesis    Invasive procedure typically performed in the second trimester by which amniotic fluid is obtained for the purpose of chromosome analysis and/or other prenatal genetic analysis    Diagnostic results; >99% sensitivity for fetal chromosome abnormalities    AFAFP measurement tests for open neural tube defects    We reviewed the benefits and limitations of this testing.  Screening  tests provide a risk assessment specific to the pregnancy for certain fetal chromosome abnormalities, but cannot definitively diagnose or exclude a fetal chromosome abnormality.  Follow-up genetic counseling and consideration of diagnostic testing is recommended with any abnormal screening result.     Diagnostic tests carry inherent risks- including risk of miscarriage- that require careful consideration.  These tests can detect fetal chromosome abnormalities with greater than 99% certainty.  Results can be compromised by maternal cell contamination or mosaicism, and are limited by the resolution of cytogenetic G-banding technology.  There is no screening nor diagnostic test that can detect all forms of birth defects or mental disability.     [facility] a [facility]. Face-to-face time of the meeting was 10 minutes.    Alma Cedeño, [facility], [facility]  Licensed Genetic Counselor  [facility]  Maternal Fetal Medicine  Phone: [phone]  [email or web address]

## 2022-09-07 ENCOUNTER — TELEPHONE (OUTPATIENT)
Dept: MATERNAL FETAL MEDICINE | Facility: CLINIC | Age: 32
End: 2022-09-07

## 2022-09-07 LAB — SCANNED LAB RESULT: NORMAL

## 2022-09-07 NOTE — TELEPHONE ENCOUNTER
September 7, 2022    Left a message for Yary regarding her NIPT results.     Results indicate NO ANEUPLOIDY DETECTED for chromosomes 21, 18, or 13.    This puts her current pregnancy at low risk for Down syndrome, trisomy 18, and trisomy 13. This test is reported to have the following sensitivities: Down syndrome: 99.99%, trisomy 18: 99.99%, and trisomy 13: 99.99%. Although these results are reassuring, this does not replace a standard chromosome analysis from a chorionic villus sampling or amniocentesis.     Her results are available in her Epic chart for her primary OB to review.      Alma Cedeño, [facility], [facility]  Licensed Genetic Counselor  [facility]  Maternal Fetal Medicine  Phone: [phone]  [email or web address]

## 2022-09-14 ENCOUNTER — TELEPHONE (OUTPATIENT)
Dept: OBGYN | Facility: CLINIC | Age: 32
End: 2022-09-14

## 2022-09-14 NOTE — TELEPHONE ENCOUNTER
Patient called and requested to change the date of her  to 23 or 23.  Patient is currently scheduled on 23 and her  has a work conflict with that date.

## 2022-09-19 ENCOUNTER — PRENATAL OFFICE VISIT (OUTPATIENT)
Dept: OBGYN | Facility: CLINIC | Age: 32
End: 2022-09-19
Payer: COMMERCIAL

## 2022-09-19 VITALS
SYSTOLIC BLOOD PRESSURE: 110 MMHG | DIASTOLIC BLOOD PRESSURE: 60 MMHG | HEIGHT: 61 IN | WEIGHT: 130.9 LBS | BODY MASS INDEX: 24.72 KG/M2

## 2022-09-19 DIAGNOSIS — Z34.82 NORMAL PREGNANCY IN MULTIGRAVIDA IN SECOND TRIMESTER: ICD-10-CM

## 2022-09-19 DIAGNOSIS — E03.9 HYPOTHYROIDISM, UNSPECIFIED TYPE: ICD-10-CM

## 2022-09-19 DIAGNOSIS — Z34.92 PRENATAL CARE IN SECOND TRIMESTER: Primary | ICD-10-CM

## 2022-09-19 PROCEDURE — 36415 COLL VENOUS BLD VENIPUNCTURE: CPT | Performed by: FAMILY MEDICINE

## 2022-09-19 PROCEDURE — 99207 PR PRENATAL VISIT: CPT | Performed by: FAMILY MEDICINE

## 2022-09-19 PROCEDURE — 84443 ASSAY THYROID STIM HORMONE: CPT | Performed by: FAMILY MEDICINE

## 2022-09-19 NOTE — PATIENT INSTRUCTIONS
"Return 4 weeks   Return to clinic:  every 4 weeks till 28 weeks, then every 2 weeks till 36 weeks, then weekly till delivery      Phone numbers [place]:  Day/ night [phone] ask for ob triage  Emergency:  Call labor and delivery:  [phone]    What should I call about??    Contraction every 5 minutes for 1 hour 1 minute long (511), bleeding, loss of fluid, headache that doesn't resolve with tylenol, and decreased fetal movement     Start kick counts @ 26-28 weeks   There is an carlito for this!  It is called \"count the kicks\"  Keep track of movement and discover your normal baby movement pattern   guideline is listed below  Please call if you do not feel the baby move!  We will have you come in for fetal heart rate monitoring:   Perception of at least 10 FMs during 12 hours of normal maternal activity   Perception of least 10 FMs over two hours when the mother is at rest and focused on [facility] Address   [address], [place], [place] [address] [phone]    Dr. Neris Nur, DO    OB/GYN   [facility]                                                    "

## 2022-09-19 NOTE — PROGRESS NOTES
"CC: Here for routine prenatal visit   31 year old y/o  @ 23w1d with Estimated Date of Delivery: Loy 15, 2023     /60   Ht 1.549 m (5' 1\")   Wt 59.4 kg (130 lb 14.4 oz)   LMP 2022   BMI 24.73 kg/m       See OB flowsheet  + fetal movement, no contractions, no bleeding, no loss of fluid   Discussed monitoring fetal movement     1) concerns: none  Covid-19 concerns: covid infection and vaccination recommendations discussed.   2) Routine: Blood type A+ RI tdap [ ] flu [ ] GS [declined]   Covid v [v and b] GTT [ ]   3) Risk factors:               A: Hx of  section: RCS 2023              B: Hashimotos, hypothyroid:  tsh each monthly, synthroid 112 mcg   C: CPC bilateral, now unilateral, EIF resolved:  Followed with MFM     4) Return: 4 weeks     [place]     "

## 2022-09-19 NOTE — NURSING NOTE
"23w1d    Chief Complaint   Patient presents with     Prenatal Care       Initial /60   Ht 1.549 m (5' 1\")   Wt 59.4 kg (130 lb 14.4 oz)   LMP 2022   BMI 24.73 kg/m   Estimated body mass index is 24.73 kg/m  as calculated from the following:    Height as of this encounter: 1.549 m (5' 1\").    Weight as of this encounter: 59.4 kg (130 lb 14.4 oz).  BP completed using cuff size: regular    Questioned patient about current smoking habits.  Pt. has never smoked.          The following HM Due: NONE      " Pre-procedure Intake  If YES to any questions or NO to having a   Please complete laminated checklist and leave on the computer keyboard for Provider, verbally inform provider if able.      For SCS Trial, RFA's or any sedation procedure:  NA    Are you taking any any blood thinners such as Coumadin, Warfarin, Jantoven, Pradaxa Xarelto, Eliquis, Edoxaban, Enoxaparin, Lovenox, Heparin, Arixtra, Fondaparinux, or Fragmin? OR Antiplatelet medication such as Plavix, Brilinta, or Effient?   No     If yes, when did you take your last dose?     Do you take aspirin?  Yes -   ASA    If cervical procedure, have you held aspirin for 6 days?   Yes    Do you have any allergies to contrast dye, iodine, steroid and/or numbing medications?  NO    Are you currently taking antibiotics or have an active infection? No Finished this week.    Have you had a fever/elevated temperature within the past week? NO    Are you currently taking oral steroids? NO    Do you have a ? Yes    Are you pregnant or breastfeeding?  Not Applicable    Have you received the COVID-19 vaccine? No     Notify provider and RNs if systolic BP >170, diastolic BP >100, P >100 or O2 sats < 90%

## 2022-09-20 LAB — TSH SERPL DL<=0.005 MIU/L-ACNC: 2.14 MU/L (ref 0.4–4)

## 2022-09-21 ENCOUNTER — HOSPITAL ENCOUNTER (OUTPATIENT)
Dept: ULTRASOUND IMAGING | Facility: CLINIC | Age: 32
Discharge: HOME | End: 2022-09-21
Attending: OBSTETRICS & GYNECOLOGY
Payer: COMMERCIAL

## 2022-09-21 ENCOUNTER — OFFICE VISIT (OUTPATIENT)
Dept: MATERNAL FETAL MEDICINE | Facility: CLINIC | Age: 32
End: 2022-09-21
Attending: OBSTETRICS & GYNECOLOGY
Payer: COMMERCIAL

## 2022-09-21 DIAGNOSIS — O35.8XX1: Primary | ICD-10-CM

## 2022-09-21 DIAGNOSIS — O35.03X0 CHOROID PLEXUS CYST OF FETUS IN SINGLETON PREGNANCY: ICD-10-CM

## 2022-09-21 DIAGNOSIS — Z36.2 ENCOUNTER FOR FOLLOW-UP ULTRASOUND OF FETAL ANATOMY: ICD-10-CM

## 2022-09-21 PROCEDURE — 76816 OB US FOLLOW-UP PER FETUS: CPT

## 2022-09-21 PROCEDURE — 76816 OB US FOLLOW-UP PER FETUS: CPT | Mod: 26 | Performed by: OBSTETRICS & GYNECOLOGY

## 2022-09-21 NOTE — PROGRESS NOTES
The patient was seen for an ultrasound in the Maternal[facility]etal Medicine Center at the [facility] today.  For a detailed report of the ultrasound examination, please see the ultrasound report which can be found under the imaging tab.    Anni Faria MD  , OB/GYN  Maternal-Fetal Medicine  [phone] (Pager)

## 2022-10-09 DIAGNOSIS — E03.9 HYPOTHYROIDISM, UNSPECIFIED TYPE: ICD-10-CM

## 2022-10-10 RX ORDER — LEVOTHYROXINE SODIUM 150 UG/1
TABLET ORAL
Qty: 90 TABLET | Refills: 0 | Status: SHIPPED | OUTPATIENT
Start: 2022-10-10 | End: 2023-01-19

## 2022-10-10 NOTE — TELEPHONE ENCOUNTER
"Requested Prescriptions   Pending Prescriptions Disp Refills     levothyroxine (SYNTHROID/LEVOTHROID) 150 MCG tablet [Pharmacy Med Name: LEVOTHYROXINE 150 MCG TABLET] 90 tablet 0     Sig: TAKE 1 TABLET BY MOUTH EVERY DAY       Thyroid Protocol Failed - 10/9/2022  8:02 AM        Failed - No active pregnancy on record     If patient is pregnant or has had a positive pregnancy test, please check TSH.          Passed - Patient is 12 years or older        Passed - Recent (12 mo) or future (30 days) visit within the authorizing provider's specialty     Patient has had an office visit with the authorizing provider or a provider within the authorizing providers department within the previous 12 mos or has a future within next 30 days. See \"Patient Info\" tab in inbasket, or \"Choose Columns\" in Meds & Orders section of the refill encounter.              Passed - Medication is active on med list        Passed - Normal TSH on file in past 12 months     Recent Labs   Lab Test 09/19/22  1609   TSH 2.14              Passed - No positive pregnancy test in past 12 months     If patient is pregnant or has had a positive pregnancy test, please check TSH.             Filled.    Jessica KENT RN BSN        "

## 2022-10-15 ENCOUNTER — HEALTH MAINTENANCE LETTER (OUTPATIENT)
Age: 32
End: 2022-10-15

## 2022-10-17 ENCOUNTER — PRENATAL OFFICE VISIT (OUTPATIENT)
Dept: OBGYN | Facility: CLINIC | Age: 32
End: 2022-10-17
Payer: COMMERCIAL

## 2022-10-17 VITALS
DIASTOLIC BLOOD PRESSURE: 52 MMHG | BODY MASS INDEX: 25.43 KG/M2 | SYSTOLIC BLOOD PRESSURE: 102 MMHG | HEIGHT: 61 IN | WEIGHT: 134.7 LBS

## 2022-10-17 DIAGNOSIS — E03.9 HYPOTHYROIDISM, UNSPECIFIED TYPE: ICD-10-CM

## 2022-10-17 DIAGNOSIS — Z34.92 PRENATAL CARE IN SECOND TRIMESTER: Primary | ICD-10-CM

## 2022-10-17 LAB
ERYTHROCYTE [DISTWIDTH] IN BLOOD BY AUTOMATED COUNT: 12.4 % (ref 10–15)
GLUCOSE 1H P 50 G GLC PO SERPL-MCNC: 84 MG/DL (ref 70–129)
HCT VFR BLD AUTO: 37.9 % (ref 35–47)
HGB BLD-MCNC: 13 G/DL (ref 11.7–15.7)
MCH RBC QN AUTO: 30.9 PG (ref 26.5–33)
MCHC RBC AUTO-ENTMCNC: 34.3 G/DL (ref 31.5–36.5)
MCV RBC AUTO: 90 FL (ref 78–100)
PLATELET # BLD AUTO: 226 10E3/UL (ref 150–450)
RBC # BLD AUTO: 4.21 10E6/UL (ref 3.8–5.2)
WBC # BLD AUTO: 7.5 10E3/UL (ref 4–11)

## 2022-10-17 PROCEDURE — 85027 COMPLETE CBC AUTOMATED: CPT | Performed by: FAMILY MEDICINE

## 2022-10-17 PROCEDURE — 86780 TREPONEMA PALLIDUM: CPT | Performed by: FAMILY MEDICINE

## 2022-10-17 PROCEDURE — 84443 ASSAY THYROID STIM HORMONE: CPT | Performed by: FAMILY MEDICINE

## 2022-10-17 PROCEDURE — 99207 PR PRENATAL VISIT: CPT | Performed by: FAMILY MEDICINE

## 2022-10-17 PROCEDURE — 90715 TDAP VACCINE 7 YRS/> IM: CPT | Performed by: FAMILY MEDICINE

## 2022-10-17 PROCEDURE — 82950 GLUCOSE TEST: CPT | Performed by: FAMILY MEDICINE

## 2022-10-17 PROCEDURE — 90471 IMMUNIZATION ADMIN: CPT | Performed by: FAMILY MEDICINE

## 2022-10-17 PROCEDURE — 36415 COLL VENOUS BLD VENIPUNCTURE: CPT | Performed by: FAMILY MEDICINE

## 2022-10-17 NOTE — NURSING NOTE
"27w1d    Chief Complaint   Patient presents with     Prenatal Care     Tdap injection       Initial /52   Ht 1.549 m (5' 1\")   Wt 61.1 kg (134 lb 11.2 oz)   LMP 2022   BMI 25.45 kg/m   Estimated body mass index is 25.45 kg/m  as calculated from the following:    Height as of this encounter: 1.549 m (5' 1\").    Weight as of this encounter: 61.1 kg (134 lb 11.2 oz).  BP completed using cuff size: regular    Questioned patient about current smoking habits.  Pt. has never smoked.          The following HM Due: NONE    "

## 2022-10-17 NOTE — PATIENT INSTRUCTIONS
"Return 2 weeks  Return to clinic:  every 4 weeks till 28 weeks, then every 2 weeks till 36 weeks, then weekly till delivery      Phone numbers [place]:  Day/ night [phone] ask for ob triage  Emergency:  Call labor and delivery:  [phone]    What should I call about??    Contraction every 5 minutes for 1 hour 1 minute long (511), bleeding, loss of fluid, headache that doesn't resolve with tylenol, and decreased fetal movement     Start kick counts @ 26-28 weeks   There is an carlito for this!  It is called \"count the kicks\"  Keep track of movement and discover your normal baby movement pattern   guideline is listed below  Please call if you do not feel the baby move!  We will have you come in for fetal heart rate monitoring:   Perception of at least 10 FMs during 12 hours of normal maternal activity   Perception of least 10 FMs over two hours when the mother is at rest and focused on [facility] Address   [address], [place], [place] [address] [phone]    Dr. Neris Nur, DO    OB/GYN   [facility]                                                    "

## 2022-10-17 NOTE — PROGRESS NOTES
"CC: Here for routine prenatal visit   31 year old y/o  @ 27w1d with Estimated Date of Delivery: Loy 15, 2023     /52   Ht 1.549 m (5' 1\")   Wt 61.1 kg (134 lb 11.2 oz)   LMP 2022   BMI 25.45 kg/m    See OB flowsheet  + fetal movement, no contractions, no bleeding, no loss of fluid   Discussed monitoring fetal movement     1) concerns: glucose and tdap today   Covid-19 concerns: covid infection and vaccination recommendations discussed.   2) Routine: Blood type A+ RI tdap [x] flu [ ] GS [declined]  Covid v [v and b] GTT [ ]   XY   3) Risk factors:               A: Hx of  section: RCS 2023              B: Hashimotos, hypothyroid:  tsh each monthly, synthroid 112 mcg              C: CPC bilateral, now unilateral, EIF resolved:  Followed with MFM      4) Return: 2 weeks    Tillemans       "

## 2022-10-18 LAB
T PALLIDUM AB SER QL: NONREACTIVE
TSH SERPL DL<=0.005 MIU/L-ACNC: 2.54 MU/L (ref 0.4–4)

## 2022-11-23 ENCOUNTER — OFFICE VISIT (OUTPATIENT)
Dept: MATERNAL FETAL MEDICINE | Facility: CLINIC | Age: 32
End: 2022-11-23
Attending: OBSTETRICS & GYNECOLOGY
Payer: COMMERCIAL

## 2022-11-23 ENCOUNTER — HOSPITAL ENCOUNTER (OUTPATIENT)
Dept: ULTRASOUND IMAGING | Facility: CLINIC | Age: 32
Discharge: HOME | End: 2022-11-23
Attending: OBSTETRICS & GYNECOLOGY
Payer: COMMERCIAL

## 2022-11-23 DIAGNOSIS — O35.8XX1: ICD-10-CM

## 2022-11-23 DIAGNOSIS — O40.9XX0 POLYHYDRAMNIOS AFFECTING PREGNANCY: Primary | ICD-10-CM

## 2022-11-23 PROCEDURE — 76816 OB US FOLLOW-UP PER FETUS: CPT

## 2022-11-23 PROCEDURE — 76816 OB US FOLLOW-UP PER FETUS: CPT | Mod: 26 | Performed by: OBSTETRICS & GYNECOLOGY

## 2022-11-23 NOTE — PROGRESS NOTES
Please see full imaging report from [employer] program under imaging tab.    Anil Covarrubias MD  Maternal Fetal Medicine

## 2022-12-01 ENCOUNTER — PRENATAL OFFICE VISIT (OUTPATIENT)
Dept: OBGYN | Facility: CLINIC | Age: 32
End: 2022-12-01
Payer: COMMERCIAL

## 2022-12-01 VITALS
BODY MASS INDEX: 26.26 KG/M2 | SYSTOLIC BLOOD PRESSURE: 100 MMHG | HEIGHT: 61 IN | DIASTOLIC BLOOD PRESSURE: 62 MMHG | WEIGHT: 139.1 LBS

## 2022-12-01 DIAGNOSIS — E03.9 HYPOTHYROIDISM, UNSPECIFIED TYPE: ICD-10-CM

## 2022-12-01 DIAGNOSIS — Z34.92 PRENATAL CARE IN SECOND TRIMESTER: Primary | ICD-10-CM

## 2022-12-01 PROCEDURE — 99207 PR PRENATAL VISIT: CPT | Performed by: FAMILY MEDICINE

## 2022-12-01 NOTE — PATIENT INSTRUCTIONS
"Return 2 weeks   Return to clinic:  every 4 weeks till 28 weeks, then every 2 weeks till 36 weeks, then weekly till delivery      Phone numbers [place]:  Day/ night [phone] ask for ob triage  Emergency:  Call labor and delivery:  [phone]    What should I call about??    Contraction every 5 minutes for 1 hour 1 minute long (511), bleeding, loss of fluid, headache that doesn't resolve with tylenol, and decreased fetal movement     Start kick counts @ 26-28 weeks   There is an carlito for this!  It is called \"count the kicks\"  Keep track of movement and discover your normal baby movement pattern   guideline is listed below  Please call if you do not feel the baby move!  We will have you come in for fetal heart rate monitoring:   Perception of at least 10 FMs during 12 hours of normal maternal activity   Perception of least 10 FMs over two hours when the mother is at rest and focused on [facility] Address   [address], [place], [place] [address] [phone]    Dr. Neris Nur, DO    OB/GYN   [facility]                                                    "

## 2022-12-01 NOTE — NURSING NOTE
"33w4d    Chief Complaint   Patient presents with     Prenatal Care       Initial /62   Ht 1.549 m (5' 1\")   Wt 63.1 kg (139 lb 1.6 oz)   LMP 2022   BMI 26.28 kg/m   Estimated body mass index is 26.28 kg/m  as calculated from the following:    Height as of this encounter: 1.549 m (5' 1\").    Weight as of this encounter: 63.1 kg (139 lb 1.6 oz).  BP completed using cuff size: regular    Questioned patient about current smoking habits.  Pt. has never smoked.          The following HM Due: NONE    "

## 2022-12-01 NOTE — PROGRESS NOTES
"CC: Here for routine prenatal visit   31 year old y/o  @ 33w4d with Estimated Date of Delivery: Loy 15, 2023     /62   Ht 1.549 m (5' 1\")   Wt 63.1 kg (139 lb 1.6 oz)   LMP 2022   BMI 26.28 kg/m    See OB flowsheet  + fetal movement, no contractions, no bleeding, no loss of fluid   Discussed monitoring fetal movement     1) concerns:   Covid-19 concerns: covid infection and vaccination recommendations discussed.   2) Routine: Blood type A+ RI tdap [x] flu [ ] GS [declined]  Covid v [v and b] GTT [ ]   XY   3) Risk factors:               A: Hx of  section: RCS 2023              B: Hashimotos, h, then resolved, EIF resolved:  Followed with MFM    D: Polyhydramnios: followed by MFM, re-evaluation in 2 weeks   E: Persistent right umbilical vein: follow for growth  4) Return: 2 weeks     Liudmila     "

## 2022-12-07 ENCOUNTER — HOSPITAL ENCOUNTER (OUTPATIENT)
Dept: ULTRASOUND IMAGING | Facility: CLINIC | Age: 32
Discharge: HOME | End: 2022-12-07
Attending: OBSTETRICS & GYNECOLOGY
Payer: COMMERCIAL

## 2022-12-07 ENCOUNTER — OFFICE VISIT (OUTPATIENT)
Dept: MATERNAL FETAL MEDICINE | Facility: CLINIC | Age: 32
End: 2022-12-07
Attending: OBSTETRICS & GYNECOLOGY
Payer: COMMERCIAL

## 2022-12-07 DIAGNOSIS — O40.9XX0 POLYHYDRAMNIOS AFFECTING PREGNANCY: Primary | ICD-10-CM

## 2022-12-07 DIAGNOSIS — O40.9XX0 POLYHYDRAMNIOS AFFECTING PREGNANCY: ICD-10-CM

## 2022-12-07 DIAGNOSIS — Q27.0 UMBILICAL CORD, VENOUS ANOMALY: ICD-10-CM

## 2022-12-07 PROCEDURE — 76815 OB US LIMITED FETUS(S): CPT | Mod: 26 | Performed by: OBSTETRICS & GYNECOLOGY

## 2022-12-07 PROCEDURE — 76815 OB US LIMITED FETUS(S): CPT

## 2022-12-07 NOTE — PROGRESS NOTES
"Please see \"Imaging\" tab under \"Chart Review\" for details of today's US at the [facility].    Barney Butler MD  Maternal-Fetal Medicine    "

## 2022-12-14 ENCOUNTER — PRENATAL OFFICE VISIT (OUTPATIENT)
Dept: OBGYN | Facility: CLINIC | Age: 32
End: 2022-12-14
Payer: COMMERCIAL

## 2022-12-14 VITALS
BODY MASS INDEX: 27 KG/M2 | DIASTOLIC BLOOD PRESSURE: 60 MMHG | HEIGHT: 61 IN | WEIGHT: 143 LBS | SYSTOLIC BLOOD PRESSURE: 102 MMHG

## 2022-12-14 DIAGNOSIS — O34.219 PREVIOUS CESAREAN DELIVERY, ANTEPARTUM CONDITION OR COMPLICATION: ICD-10-CM

## 2022-12-14 DIAGNOSIS — Z23 NEED FOR PROPHYLACTIC VACCINATION AND INOCULATION AGAINST INFLUENZA: ICD-10-CM

## 2022-12-14 DIAGNOSIS — E03.9 HYPOTHYROIDISM, UNSPECIFIED TYPE: ICD-10-CM

## 2022-12-14 DIAGNOSIS — Z34.92 PRENATAL CARE IN SECOND TRIMESTER: Primary | ICD-10-CM

## 2022-12-14 PROCEDURE — 99207 PR PRENATAL VISIT: CPT | Performed by: FAMILY MEDICINE

## 2022-12-14 PROCEDURE — 90471 IMMUNIZATION ADMIN: CPT | Performed by: FAMILY MEDICINE

## 2022-12-14 PROCEDURE — 90686 IIV4 VACC NO PRSV 0.5 ML IM: CPT | Performed by: FAMILY MEDICINE

## 2022-12-14 NOTE — PROGRESS NOTES
"CC: Here for routine prenatal visit   31 year old y/o  @ 35w3d with Estimated Date of Delivery: Loy 15, 2023     /60 (BP Location: Right arm, Patient Position: Chair, Cuff Size: Adult Regular)   Ht 1.549 m (5' 1\")   Wt 64.9 kg (143 lb)   LMP 2022   Breastfeeding No   BMI 27.02 kg/m       See OB flowsheet  + fetal movement, no contractions, no bleeding, no loss of fluid   Discussed monitoring fetal movement     1) concerns: none today   Covid-19 concerns: covid infection and vaccination recommendations discussed.   2) Routine: Blood type A+ RI tdap [x] flu [ ] GS [declined]  Covid v [v and b] GTT [ ]   XY   3) Risk factors:               A: Hx of  section: RCS 2023              B: Hashimotos: tsh q TM, last 10/22, nl   C: EIF resolved:  Followed with MFM               D: Polyhydramnios, resolved: followed by MFM, no further testing              E: Persistent right umbilical vein: follow for growth   F: ppbc: condoms    4) Return: weekly     Tillemans     "

## 2022-12-14 NOTE — PATIENT INSTRUCTIONS
"Return weekly   Return to clinic:  every 4 weeks till 28 weeks, then every 2 weeks till 36 weeks, then weekly till delivery      Phone numbers [place]:  Day/ night [phone] ask for ob triage  Emergency:  Call labor and delivery:  [phone]    What should I call about??    Contraction every 5 minutes for 1 hour 1 minute long (511), bleeding, loss of fluid, headache that doesn't resolve with tylenol, and decreased fetal movement     Start kick counts @ 26-28 weeks   There is an carlito for this!  It is called \"count the kicks\"  Keep track of movement and discover your normal baby movement pattern   guideline is listed below  Please call if you do not feel the baby move!  We will have you come in for fetal heart rate monitoring:   Perception of at least 10 FMs during 12 hours of normal maternal activity   Perception of least 10 FMs over two hours when the mother is at rest and focused on [facility] Address   [address], [place], [place] [address] [phone]    Dr. Neris Nur, DO    OB/GYN   [facility]                                                    "

## 2022-12-19 ENCOUNTER — PRENATAL OFFICE VISIT (OUTPATIENT)
Dept: OBGYN | Facility: CLINIC | Age: 32
End: 2022-12-19
Payer: COMMERCIAL

## 2022-12-19 VITALS
SYSTOLIC BLOOD PRESSURE: 108 MMHG | WEIGHT: 143.5 LBS | HEIGHT: 61 IN | DIASTOLIC BLOOD PRESSURE: 64 MMHG | BODY MASS INDEX: 27.09 KG/M2

## 2022-12-19 DIAGNOSIS — Z34.93 PRENATAL CARE IN THIRD TRIMESTER: ICD-10-CM

## 2022-12-19 DIAGNOSIS — O34.219 PREVIOUS CESAREAN DELIVERY, ANTEPARTUM CONDITION OR COMPLICATION: Primary | ICD-10-CM

## 2022-12-19 PROCEDURE — 99207 PR PRENATAL VISIT: CPT | Performed by: FAMILY MEDICINE

## 2022-12-19 PROCEDURE — 87653 STREP B DNA AMP PROBE: CPT | Performed by: FAMILY MEDICINE

## 2022-12-19 NOTE — PATIENT INSTRUCTIONS
"Return weekly   Return to clinic:  every 4 weeks till 28 weeks, then every 2 weeks till 36 weeks, then weekly till delivery      Phone numbers [place]:  Day/ night [phone] ask for ob triage  Emergency:  Call labor and delivery:  [phone]    What should I call about??    Contraction every 5 minutes for 1 hour 1 minute long (511), bleeding, loss of fluid, headache that doesn't resolve with tylenol, and decreased fetal movement     Start kick counts @ 26-28 weeks   There is an carlito for this!  It is called \"count the kicks\"  Keep track of movement and discover your normal baby movement pattern   guideline is listed below  Please call if you do not feel the baby move!  We will have you come in for fetal heart rate monitoring:   Perception of at least 10 FMs during 12 hours of normal maternal activity   Perception of least 10 FMs over two hours when the mother is at rest and focused on [facility] Address   [address], [place], [place] [address] [phone]    Dr. Neris Nur, DO    OB/GYN   [facility]                                                    "

## 2022-12-19 NOTE — PROGRESS NOTES
"CC: Here for routine prenatal visit   31 year old y/o  @ 36w1d with Estimated Date of Delivery: Loy 15, 2023     /64   Ht 1.549 m (5' 1\")   Wt 65.1 kg (143 lb 8 oz)   LMP 2022   BMI 27.11 kg/m    See OB flowsheet  + fetal movement, no contractions, no bleeding, no loss of fluid   Discussed monitoring fetal movement     1) concerns: none today   gbs today   cephalic  Covid-19 concerns: covid infection and vaccination recommendations discussed.   2) Routine: Blood type A+ RI tdap [x] flu [ ] GS [declined]  Covid v [v and b] GTT [ ]   XY   3) Risk factors:               A: Hx of  section: RCS 2023              B: Hashimotos: tsh q TM, last 10/22, nl              C: EIF resolved:  Followed with MFM               D: Polyhydramnios, resolved: followed by MFM, no further testing              E: Persistent right umbilical vein: follow for growth              F: ppbc: condoms    4) Return: weekly     Liudmila     "

## 2022-12-19 NOTE — NURSING NOTE
"36w1d    Chief Complaint   Patient presents with     Prenatal Care     GBS due       Initial /64   Ht 1.549 m (5' 1\")   Wt 65.1 kg (143 lb 8 oz)   LMP 2022   BMI 27.11 kg/m   Estimated body mass index is 27.11 kg/m  as calculated from the following:    Height as of this encounter: 1.549 m (5' 1\").    Weight as of this encounter: 65.1 kg (143 lb 8 oz).  BP completed using cuff size: regular    Questioned patient about current smoking habits.  Pt. has never smoked.          The following  Due: NONE    "

## 2022-12-20 LAB — GP B STREP DNA SPEC QL NAA+PROBE: NEGATIVE

## 2022-12-27 ENCOUNTER — PRENATAL OFFICE VISIT (OUTPATIENT)
Dept: OBGYN | Facility: CLINIC | Age: 32
End: 2022-12-27
Payer: COMMERCIAL

## 2022-12-27 VITALS — WEIGHT: 145 LBS | DIASTOLIC BLOOD PRESSURE: 62 MMHG | BODY MASS INDEX: 27.4 KG/M2 | SYSTOLIC BLOOD PRESSURE: 108 MMHG

## 2022-12-27 DIAGNOSIS — E03.9 HYPOTHYROIDISM AFFECTING PREGNANCY IN THIRD TRIMESTER: ICD-10-CM

## 2022-12-27 DIAGNOSIS — O35.8XX0 UMBILICAL VEIN ABNORMALITY AFFECTING PREGNANCY, SINGLE OR UNSPECIFIED FETUS: ICD-10-CM

## 2022-12-27 DIAGNOSIS — O34.219 PREVIOUS CESAREAN DELIVERY, ANTEPARTUM CONDITION OR COMPLICATION: Primary | ICD-10-CM

## 2022-12-27 DIAGNOSIS — O99.283 HYPOTHYROIDISM AFFECTING PREGNANCY IN THIRD TRIMESTER: ICD-10-CM

## 2022-12-27 PROBLEM — Z34.00 ENCOUNTER FOR SUPERVISION OF NORMAL FIRST PREGNANCY: Status: RESOLVED | Noted: 2018-09-26 | Resolved: 2022-12-27

## 2022-12-27 PROCEDURE — 99207 PR COMPLICATED OB VISIT: CPT | Performed by: OBSTETRICS & GYNECOLOGY

## 2022-12-27 NOTE — NURSING NOTE
"Chief Complaint   Patient presents with     Prenatal Care     37 weeks 2 days- no concerns        Initial /62 (BP Location: Right arm, Patient Position: Sitting, Cuff Size: Adult Regular)   Wt 65.8 kg (145 lb)   LMP 2022   BMI 27.40 kg/m   Estimated body mass index is 27.4 kg/m  as calculated from the following:    Height as of 22: 1.549 m (5' 1\").    Weight as of this encounter: 65.8 kg (145 lb).  BP completed using cuff size: regular    Questioned patient about current smoking habits.  Pt. has never smoked.          The following HM Due: NONE    37w2d  Rd Wetzel CMA                "

## 2022-12-27 NOTE — PROGRESS NOTES
No c/o's.  Has RC/S 2023.  Preop w/ Dr. Nur next week.  Fetal movement counts BID, rupture of membranes/labor precautions reviewed.  RTC 1 week(s).    Encounter Diagnoses   Name Primary?     Previous  delivery, antepartum condition or complication Yes     Hypothyroidism affecting pregnancy in third trimester      Umbilical vein abnormality affecting pregnancy, single or unspecified fetus        Risk factors listed above are stable and being addressed as noted.    Stanford Blum MD  [facility] [place]

## 2023-01-05 ENCOUNTER — PRENATAL OFFICE VISIT (OUTPATIENT)
Dept: OBGYN | Facility: CLINIC | Age: 33
End: 2023-01-05
Payer: COMMERCIAL

## 2023-01-05 VITALS — BODY MASS INDEX: 27.78 KG/M2 | SYSTOLIC BLOOD PRESSURE: 122 MMHG | WEIGHT: 147 LBS | DIASTOLIC BLOOD PRESSURE: 68 MMHG

## 2023-01-05 DIAGNOSIS — O34.219 PREVIOUS CESAREAN DELIVERY, ANTEPARTUM CONDITION OR COMPLICATION: Primary | ICD-10-CM

## 2023-01-05 DIAGNOSIS — G93.0 CHOROID PLEXUS CYST: ICD-10-CM

## 2023-01-05 DIAGNOSIS — E03.9 HYPOTHYROIDISM AFFECTING PREGNANCY IN THIRD TRIMESTER: ICD-10-CM

## 2023-01-05 DIAGNOSIS — O99.283 HYPOTHYROIDISM AFFECTING PREGNANCY IN THIRD TRIMESTER: ICD-10-CM

## 2023-01-05 PROCEDURE — 99207 PR PRENATAL VISIT: CPT | Performed by: FAMILY MEDICINE

## 2023-01-05 NOTE — NURSING NOTE
"Chief Complaint   Patient presents with     Prenatal Care   38w4d    initial /68   Wt 66.7 kg (147 lb)   LMP 04/01/2022   BMI 27.78 kg/m   Estimated body mass index is 27.78 kg/m  as calculated from the following:    Height as of 12/19/22: 1.549 m (5' 1\").    Weight as of this encounter: 66.7 kg (147 lb).  BP completed using cuff size regular.  Janny Braden CMA    "

## 2023-01-05 NOTE — PATIENT INSTRUCTIONS
"Return for  section   Return to clinic:  every 4 weeks till 28 weeks, then every 2 weeks till 36 weeks, then weekly till delivery      Phone numbers [place]:  Day/ night [phone] ask for ob triage  Emergency:  Call labor and delivery:  [phone]    What should I call about??    Contraction every 5 minutes for 1 hour 1 minute long (511), bleeding, loss of fluid, headache that doesn't resolve with tylenol, and decreased fetal movement     Start kick counts @ 26-28 weeks   There is an carlito for this!  It is called \"count the kicks\"  Keep track of movement and discover your normal baby movement pattern   guideline is listed below  Please call if you do not feel the baby move!  We will have you come in for fetal heart rate monitoring:   Perception of at least 10 FMs during 12 hours of normal maternal activity   Perception of least 10 FMs over two hours when the mother is at rest and focused on [facility] Address   [address], [place] [address]  [phone]    Dr. Neris Nur, DO    OB/GYN   [facility]                                                    "

## 2023-01-05 NOTE — PROGRESS NOTES
[facility] Labor and Delivery History and Physical    Rebekah Romero MRN# [id number]   Age: 32 year old YOB: 1990     Date of Admission:  (Not on file)    Primary care provider: Rubi Madison           Chief Complaint:   Rebekah Romero is a 32 year old female who is  @ 38w4d pregnant and being admitted for repeat .          Pregnancy history:     OBSTETRIC HISTORY:    OB History    Para Term  AB Living   3 2 2 0 0 2   SAB IAB Ectopic Multiple Live Births   0 0 0 0 2      # Outcome Date GA Lbr Edilson/2nd Weight Sex Delivery Anes PTL Lv   3 Current            2 Term 20 39w3d  3.76 kg (8 lb 4.6 oz) M CS-LTranv Spinal  GRACE      Name: CHARLENE ROMERO-REBEKAH      Apgar1: 9  Apgar5: 9   1 Term 19 41w6d 10:00 / 06:59 3.742 kg (8 lb 4 oz) M CS-LTranv EPI N GRACE      Complications: Failure to Progress in Second Stage      Name: Chivo      Apgar1: 6  Apgar5: 9       EDC: Estimated Date of Delivery: Loy 15, 2023    Prenatal Labs:   Lab Results   Component Value Date    ABO A 2020    RH Pos 2020    AS Negative 2022    HEPBANG Nonreactive 2022    CHPCRT Negative 2022    GCPCRT Negative 2022    HGB 13.0 10/17/2022       GBS Status:   Lab Results   Component Value Date    GBS Negative 2020       Active Problem List  Patient Active Problem List   Diagnosis     Hypothyroidism due to Hashimoto's thyroiditis     History of  section     Previous  delivery, antepartum condition or complication     Hypothyroidism affecting pregnancy in third trimester     Umbilical vein abnormality complicating pregnancy       Medication Prior to Admission  (Not in a hospital admission)  .        Maternal Past Medical History:     Past Medical History:   Diagnosis Date     Hypothyroid     irregular period, weight gain, family history                       Family History:   This patient has no significant family  history            Social History:   This patient has no significant social history         Review of Systems:   CONSTITUTIONAL: NEGATIVE for fever, chills, change in weight  INTEGUMENTARY/SKIN: NEGATIVE for worrisome rashes, moles or lesions  EYES: NEGATIVE for vision changes or irritation  ENT/MOUTH: NEGATIVE for ear, mouth and throat problems  RESP: NEGATIVE for significant cough or SOB  BREAST: NEGATIVE for masses, tenderness or discharge  CV: NEGATIVE for chest pain, palpitations or peripheral edema  GI: NEGATIVE for nausea, abdominal pain, heartburn, or change in bowel habits  : NEGATIVE for frequency, dysuria, or hematuria  MUSCULOSKELETAL: NEGATIVE for significant arthralgias or myalgia  NEURO: NEGATIVE for weakness, dizziness or paresthesias  ENDOCRINE: NEGATIVE for temperature intolerance, skin/hair changes  HEME: NEGATIVE for bleeding problems  PSYCHIATRIC: NEGATIVE for changes in mood or affect          Physical Exam:     Vitals were reviewed  All vitals stable  Patient Vitals for the past 8 hrs:   BP Weight   01/05/23 1410 122/68 66.7 kg (147 lb)     Constitutional: Awake, alert, cooperative, no apparent distress, and appears stated age.  Eyes: Lids and lashes normal, pupils equal, round and reactive to light, extra ocular muscles intact, sclera clear, conjunctiva normal.  ENT: Normocephalic, without obvious abnormality, atramatic, sinuses nontender on palpation, external ears without lesions, oral pharynx with moist mucus membranes, tonsils without erythema or exudates, gums normal and good dentition.  Neck: Supple, symmetrical, trachea midline, no adenopathy, thyroid symmetric, not enlarged and no tenderness, skin normal.  Hematologic / Lymphatic: No cervical lymphadenopathy and no supraclavicular lymphadenopathy.  Back: Symmetric, no curvature, spinous processes are non-tender on palpation, paraspinous muscles are non-tender on palpation, no costal vertebral tenderness.  Lungs: No increased work of  breathing, good air exchange, clear to auscultation bilaterally, no crackles or wheezing.  Cardiovascular: Regular rate and rhythm, normal S1 and S2, no S3 or S4, and no murmur noted.  Chest / Breast: Breasts symmetrical, skin without lesion(s), no nipple retraction or dimpling, no nipple discharge, no masses palpated, no axillary or supraclavicular adenopathy.  Abdomen: Gravid   Presentation:Cephalic  Fetal Heart Rate Tracins  Tocometer: no contractions                       Assessment:   Yary Muñoz is a 32 year old female who is  @ 38w4d pregnant and being admitted for repeat .        Plan:   Admit - see IP orders  Prepare for  section    Neris Nur, DO

## 2023-01-05 NOTE — H&P (VIEW-ONLY)
CC: Here for routine prenatal visit   32 year old y/o  @ 38w4d with Estimated Date of Delivery: Loy 15, 2023     /68   Wt 66.7 kg (147 lb)   LMP 2022   BMI 27.78 kg/m    See OB flowsheet  + fetal movement, no contractions, no bleeding, no loss of fluid   Discussed monitoring fetal movement     1) concerns:   Covid-19 concerns: covid infection and vaccination recommendations discussed.   ) Routine: Blood type A+ RI tdap [x] flu [ ] GS [declined]  Covid v [v and b] GTT [ ]   XY   3) Risk factors:               A: Hx of  section: RCS 2023              B: Hashimotos: tsh q TM, last 10/22, nl              C: EIF resolved:  Followed with MFM               D: Polyhydramnios, resolved: followed by MFM, no   further testing              E: Persistent right umbilical vein: follow for   growth, plan fetal echo post partum               F: ppbc: condoms      4) Return: for  section     Liudmila

## 2023-01-12 ENCOUNTER — HOSPITAL ENCOUNTER (INPATIENT)
Facility: CLINIC | Age: 33
LOS: 2 days | Discharge: HOME | End: 2023-01-14
Attending: FAMILY MEDICINE | Admitting: FAMILY MEDICINE
Payer: COMMERCIAL

## 2023-01-12 ENCOUNTER — ANESTHESIA EVENT (OUTPATIENT)
Dept: OBGYN | Facility: CLINIC | Age: 33
End: 2023-01-12
Payer: COMMERCIAL

## 2023-01-12 ENCOUNTER — ANESTHESIA (OUTPATIENT)
Dept: OBGYN | Facility: CLINIC | Age: 33
End: 2023-01-12
Payer: COMMERCIAL

## 2023-01-12 PROBLEM — Z98.891 S/P CESAREAN SECTION: Status: ACTIVE | Noted: 2023-01-12

## 2023-01-12 LAB
ABO/RH(D): NORMAL
ANTIBODY SCREEN: NEGATIVE
HGB BLD-MCNC: 11.2 G/DL (ref 11.7–15.7)
SPECIMEN EXPIRATION DATE: NORMAL
T PALLIDUM AB SER QL: NONREACTIVE

## 2023-01-12 PROCEDURE — 59510 CESAREAN DELIVERY: CPT | Performed by: FAMILY MEDICINE

## 2023-01-12 PROCEDURE — 710N000009 HC RECOVERY PHASE 1, LEVEL 1, PER MIN: Performed by: FAMILY MEDICINE

## 2023-01-12 PROCEDURE — 0HB7XZZ EXCISION OF ABDOMEN SKIN, EXTERNAL APPROACH: ICD-10-PCS | Performed by: FAMILY MEDICINE

## 2023-01-12 PROCEDURE — 258N000003 HC RX IP 258 OP 636: Performed by: ANESTHESIOLOGY

## 2023-01-12 PROCEDURE — 258N000003 HC RX IP 258 OP 636: Performed by: FAMILY MEDICINE

## 2023-01-12 PROCEDURE — C1765 ADHESION BARRIER: HCPCS | Performed by: FAMILY MEDICINE

## 2023-01-12 PROCEDURE — 370N000017 HC ANESTHESIA TECHNICAL FEE, PER MIN: Performed by: FAMILY MEDICINE

## 2023-01-12 PROCEDURE — 250N000011 HC RX IP 250 OP 636: Performed by: FAMILY MEDICINE

## 2023-01-12 PROCEDURE — 250N000011 HC RX IP 250 OP 636

## 2023-01-12 PROCEDURE — 272N000001 HC OR GENERAL SUPPLY STERILE: Performed by: FAMILY MEDICINE

## 2023-01-12 PROCEDURE — 250N000013 HC RX MED GY IP 250 OP 250 PS 637: Performed by: FAMILY MEDICINE

## 2023-01-12 PROCEDURE — 250N000009 HC RX 250: Performed by: FAMILY MEDICINE

## 2023-01-12 PROCEDURE — 360N000076 HC SURGERY LEVEL 3, PER MIN: Performed by: FAMILY MEDICINE

## 2023-01-12 PROCEDURE — 85018 HEMOGLOBIN: CPT | Performed by: FAMILY MEDICINE

## 2023-01-12 PROCEDURE — 86901 BLOOD TYPING SEROLOGIC RH(D): CPT | Performed by: FAMILY MEDICINE

## 2023-01-12 PROCEDURE — 11200 RMVL SKIN TAGS UP TO&INC 15: CPT | Mod: 51 | Performed by: FAMILY MEDICINE

## 2023-01-12 PROCEDURE — 0UBM0ZZ EXCISION OF VULVA, OPEN APPROACH: ICD-10-PCS | Performed by: FAMILY MEDICINE

## 2023-01-12 PROCEDURE — 120N000001 HC R&B MED SURG/OB

## 2023-01-12 PROCEDURE — 250N000011 HC RX IP 250 OP 636: Performed by: NURSE ANESTHETIST, CERTIFIED REGISTERED

## 2023-01-12 PROCEDURE — 86780 TREPONEMA PALLIDUM: CPT | Performed by: FAMILY MEDICINE

## 2023-01-12 PROCEDURE — 258N000003 HC RX IP 258 OP 636: Performed by: NURSE ANESTHETIST, CERTIFIED REGISTERED

## 2023-01-12 RX ORDER — OXYTOCIN 10 [USP'U]/ML
10 INJECTION, SOLUTION INTRAMUSCULAR; INTRAVENOUS
Status: DISCONTINUED | OUTPATIENT
Start: 2023-01-12 | End: 2023-01-14 | Stop reason: HOSPADM

## 2023-01-12 RX ORDER — OXYTOCIN/0.9 % SODIUM CHLORIDE 30/500 ML
100-340 PLASTIC BAG, INJECTION (ML) INTRAVENOUS CONTINUOUS PRN
Status: DISCONTINUED | OUTPATIENT
Start: 2023-01-12 | End: 2023-01-14 | Stop reason: HOSPADM

## 2023-01-12 RX ORDER — TRANEXAMIC ACID 10 MG/ML
1 INJECTION, SOLUTION INTRAVENOUS EVERY 30 MIN PRN
Status: DISCONTINUED | OUTPATIENT
Start: 2023-01-12 | End: 2023-01-14 | Stop reason: HOSPADM

## 2023-01-12 RX ORDER — MORPHINE SULFATE 1 MG/ML
INJECTION, SOLUTION EPIDURAL; INTRATHECAL; INTRAVENOUS PRN
Status: DISCONTINUED | OUTPATIENT
Start: 2023-01-12 | End: 2023-01-12

## 2023-01-12 RX ORDER — IBUPROFEN 800 MG/1
800 TABLET, FILM COATED ORAL EVERY 6 HOURS
Status: DISCONTINUED | OUTPATIENT
Start: 2023-01-13 | End: 2023-01-14 | Stop reason: HOSPADM

## 2023-01-12 RX ORDER — METOCLOPRAMIDE HYDROCHLORIDE 5 MG/ML
10 INJECTION INTRAMUSCULAR; INTRAVENOUS EVERY 6 HOURS PRN
Status: DISCONTINUED | OUTPATIENT
Start: 2023-01-12 | End: 2023-01-14 | Stop reason: HOSPADM

## 2023-01-12 RX ORDER — BISACODYL 10 MG
10 SUPPOSITORY, RECTAL RECTAL DAILY PRN
Status: DISCONTINUED | OUTPATIENT
Start: 2023-01-14 | End: 2023-01-14 | Stop reason: HOSPADM

## 2023-01-12 RX ORDER — MEPERIDINE HYDROCHLORIDE 25 MG/ML
12.5 INJECTION INTRAMUSCULAR; INTRAVENOUS; SUBCUTANEOUS
Status: DISCONTINUED | OUTPATIENT
Start: 2023-01-12 | End: 2023-01-14 | Stop reason: HOSPADM

## 2023-01-12 RX ORDER — MISOPROSTOL 200 UG/1
400 TABLET ORAL
Status: DISCONTINUED | OUTPATIENT
Start: 2023-01-12 | End: 2023-01-14 | Stop reason: HOSPADM

## 2023-01-12 RX ORDER — NALBUPHINE HYDROCHLORIDE 10 MG/ML
2.5-5 INJECTION, SOLUTION INTRAMUSCULAR; INTRAVENOUS; SUBCUTANEOUS EVERY 6 HOURS PRN
Status: DISCONTINUED | OUTPATIENT
Start: 2023-01-12 | End: 2023-01-14 | Stop reason: HOSPADM

## 2023-01-12 RX ORDER — PROCHLORPERAZINE MALEATE 10 MG
10 TABLET ORAL EVERY 6 HOURS PRN
Status: DISCONTINUED | OUTPATIENT
Start: 2023-01-12 | End: 2023-01-14 | Stop reason: HOSPADM

## 2023-01-12 RX ORDER — BUPIVACAINE HYDROCHLORIDE 7.5 MG/ML
INJECTION, SOLUTION INTRASPINAL PRN
Status: DISCONTINUED | OUTPATIENT
Start: 2023-01-12 | End: 2023-01-12

## 2023-01-12 RX ORDER — OXYTOCIN/0.9 % SODIUM CHLORIDE 30/500 ML
340 PLASTIC BAG, INJECTION (ML) INTRAVENOUS CONTINUOUS PRN
Status: COMPLETED | OUTPATIENT
Start: 2023-01-12 | End: 2023-01-12

## 2023-01-12 RX ORDER — ONDANSETRON 2 MG/ML
4 INJECTION INTRAMUSCULAR; INTRAVENOUS EVERY 6 HOURS PRN
Status: DISCONTINUED | OUTPATIENT
Start: 2023-01-12 | End: 2023-01-14 | Stop reason: HOSPADM

## 2023-01-12 RX ORDER — AMOXICILLIN 250 MG
2 CAPSULE ORAL 2 TIMES DAILY
Status: DISCONTINUED | OUTPATIENT
Start: 2023-01-12 | End: 2023-01-14 | Stop reason: HOSPADM

## 2023-01-12 RX ORDER — ACETAMINOPHEN 325 MG/1
975 TABLET ORAL EVERY 6 HOURS
Status: DISCONTINUED | OUTPATIENT
Start: 2023-01-12 | End: 2023-01-14 | Stop reason: HOSPADM

## 2023-01-12 RX ORDER — ACETAMINOPHEN 325 MG/1
975 TABLET ORAL ONCE
Status: COMPLETED | OUTPATIENT
Start: 2023-01-12 | End: 2023-01-12

## 2023-01-12 RX ORDER — FENTANYL CITRATE 50 UG/ML
INJECTION, SOLUTION INTRAMUSCULAR; INTRAVENOUS PRN
Status: DISCONTINUED | OUTPATIENT
Start: 2023-01-12 | End: 2023-01-12

## 2023-01-12 RX ORDER — DEXAMETHASONE SODIUM PHOSPHATE 4 MG/ML
INJECTION, SOLUTION INTRA-ARTICULAR; INTRALESIONAL; INTRAMUSCULAR; INTRAVENOUS; SOFT TISSUE PRN
Status: DISCONTINUED | OUTPATIENT
Start: 2023-01-12 | End: 2023-01-12

## 2023-01-12 RX ORDER — NALOXONE HYDROCHLORIDE 0.4 MG/ML
0.2 INJECTION, SOLUTION INTRAMUSCULAR; INTRAVENOUS; SUBCUTANEOUS
Status: DISCONTINUED | OUTPATIENT
Start: 2023-01-12 | End: 2023-01-14 | Stop reason: HOSPADM

## 2023-01-12 RX ORDER — KETOROLAC TROMETHAMINE 30 MG/ML
30 INJECTION, SOLUTION INTRAMUSCULAR; INTRAVENOUS EVERY 6 HOURS
Status: COMPLETED | OUTPATIENT
Start: 2023-01-12 | End: 2023-01-13

## 2023-01-12 RX ORDER — AMOXICILLIN 250 MG
1 CAPSULE ORAL 2 TIMES DAILY
Status: DISCONTINUED | OUTPATIENT
Start: 2023-01-12 | End: 2023-01-14 | Stop reason: HOSPADM

## 2023-01-12 RX ORDER — LIDOCAINE 40 MG/G
CREAM TOPICAL
Status: DISCONTINUED | OUTPATIENT
Start: 2023-01-12 | End: 2023-01-14 | Stop reason: HOSPADM

## 2023-01-12 RX ORDER — CARBOPROST TROMETHAMINE 250 UG/ML
250 INJECTION, SOLUTION INTRAMUSCULAR
Status: DISCONTINUED | OUTPATIENT
Start: 2023-01-12 | End: 2023-01-14 | Stop reason: HOSPADM

## 2023-01-12 RX ORDER — CEFAZOLIN SODIUM/WATER 2 G/20 ML
2 SYRINGE (ML) INTRAVENOUS
Status: COMPLETED | OUTPATIENT
Start: 2023-01-12 | End: 2023-01-12

## 2023-01-12 RX ORDER — NALOXONE HYDROCHLORIDE 0.4 MG/ML
0.4 INJECTION, SOLUTION INTRAMUSCULAR; INTRAVENOUS; SUBCUTANEOUS
Status: DISCONTINUED | OUTPATIENT
Start: 2023-01-12 | End: 2023-01-14 | Stop reason: HOSPADM

## 2023-01-12 RX ORDER — SODIUM CHLORIDE, SODIUM LACTATE, POTASSIUM CHLORIDE, CALCIUM CHLORIDE 600; 310; 30; 20 MG/100ML; MG/100ML; MG/100ML; MG/100ML
INJECTION, SOLUTION INTRAVENOUS CONTINUOUS
Status: DISCONTINUED | OUTPATIENT
Start: 2023-01-12 | End: 2023-01-14 | Stop reason: HOSPADM

## 2023-01-12 RX ORDER — FENTANYL CITRATE 50 UG/ML
25 INJECTION, SOLUTION INTRAMUSCULAR; INTRAVENOUS
Status: DISCONTINUED | OUTPATIENT
Start: 2023-01-12 | End: 2023-01-14 | Stop reason: HOSPADM

## 2023-01-12 RX ORDER — HYDROMORPHONE HCL IN WATER/PF 6 MG/30 ML
0.4 PATIENT CONTROLLED ANALGESIA SYRINGE INTRAVENOUS EVERY 5 MIN PRN
Status: DISCONTINUED | OUTPATIENT
Start: 2023-01-12 | End: 2023-01-12 | Stop reason: HOSPADM

## 2023-01-12 RX ORDER — FENTANYL CITRATE 50 UG/ML
50 INJECTION, SOLUTION INTRAMUSCULAR; INTRAVENOUS EVERY 5 MIN PRN
Status: DISCONTINUED | OUTPATIENT
Start: 2023-01-12 | End: 2023-01-12 | Stop reason: HOSPADM

## 2023-01-12 RX ORDER — CITRIC ACID/SODIUM CITRATE 334-500MG
30 SOLUTION, ORAL ORAL
Status: COMPLETED | OUTPATIENT
Start: 2023-01-12 | End: 2023-01-12

## 2023-01-12 RX ORDER — SIMETHICONE 80 MG
80 TABLET,CHEWABLE ORAL 4 TIMES DAILY PRN
Status: DISCONTINUED | OUTPATIENT
Start: 2023-01-12 | End: 2023-01-14 | Stop reason: HOSPADM

## 2023-01-12 RX ORDER — KETOROLAC TROMETHAMINE 30 MG/ML
INJECTION, SOLUTION INTRAMUSCULAR; INTRAVENOUS PRN
Status: DISCONTINUED | OUTPATIENT
Start: 2023-01-12 | End: 2023-01-12

## 2023-01-12 RX ORDER — OXYCODONE HYDROCHLORIDE 5 MG/1
5 TABLET ORAL EVERY 4 HOURS PRN
Status: DISCONTINUED | OUTPATIENT
Start: 2023-01-12 | End: 2023-01-14 | Stop reason: HOSPADM

## 2023-01-12 RX ORDER — METOCLOPRAMIDE 10 MG/1
10 TABLET ORAL EVERY 6 HOURS PRN
Status: DISCONTINUED | OUTPATIENT
Start: 2023-01-12 | End: 2023-01-14 | Stop reason: HOSPADM

## 2023-01-12 RX ORDER — FENTANYL CITRATE 50 UG/ML
25 INJECTION, SOLUTION INTRAMUSCULAR; INTRAVENOUS EVERY 5 MIN PRN
Status: DISCONTINUED | OUTPATIENT
Start: 2023-01-12 | End: 2023-01-12 | Stop reason: HOSPADM

## 2023-01-12 RX ORDER — FENTANYL CITRATE-0.9 % NACL/PF 10 MCG/ML
100 PLASTIC BAG, INJECTION (ML) INTRAVENOUS EVERY 5 MIN PRN
Status: DISCONTINUED | OUTPATIENT
Start: 2023-01-12 | End: 2023-01-14 | Stop reason: HOSPADM

## 2023-01-12 RX ORDER — OXYTOCIN/0.9 % SODIUM CHLORIDE 30/500 ML
340 PLASTIC BAG, INJECTION (ML) INTRAVENOUS CONTINUOUS PRN
Status: DISCONTINUED | OUTPATIENT
Start: 2023-01-12 | End: 2023-01-14 | Stop reason: HOSPADM

## 2023-01-12 RX ORDER — PROCHLORPERAZINE 25 MG
25 SUPPOSITORY, RECTAL RECTAL EVERY 12 HOURS PRN
Status: DISCONTINUED | OUTPATIENT
Start: 2023-01-12 | End: 2023-01-14 | Stop reason: HOSPADM

## 2023-01-12 RX ORDER — ONDANSETRON 4 MG/1
4 TABLET, ORALLY DISINTEGRATING ORAL EVERY 6 HOURS PRN
Status: DISCONTINUED | OUTPATIENT
Start: 2023-01-12 | End: 2023-01-14 | Stop reason: HOSPADM

## 2023-01-12 RX ORDER — HYDROCORTISONE 25 MG/G
CREAM TOPICAL 3 TIMES DAILY PRN
Status: DISCONTINUED | OUTPATIENT
Start: 2023-01-12 | End: 2023-01-14 | Stop reason: HOSPADM

## 2023-01-12 RX ORDER — MODIFIED LANOLIN
OINTMENT (GRAM) TOPICAL
Status: DISCONTINUED | OUTPATIENT
Start: 2023-01-12 | End: 2023-01-14 | Stop reason: HOSPADM

## 2023-01-12 RX ORDER — ONDANSETRON 2 MG/ML
4 INJECTION INTRAMUSCULAR; INTRAVENOUS EVERY 30 MIN PRN
Status: DISCONTINUED | OUTPATIENT
Start: 2023-01-12 | End: 2023-01-14 | Stop reason: HOSPADM

## 2023-01-12 RX ORDER — METHYLERGONOVINE MALEATE 0.2 MG/ML
200 INJECTION INTRAVENOUS
Status: DISCONTINUED | OUTPATIENT
Start: 2023-01-12 | End: 2023-01-14 | Stop reason: HOSPADM

## 2023-01-12 RX ORDER — DEXTROSE, SODIUM CHLORIDE, SODIUM LACTATE, POTASSIUM CHLORIDE, AND CALCIUM CHLORIDE 5; .6; .31; .03; .02 G/100ML; G/100ML; G/100ML; G/100ML; G/100ML
INJECTION, SOLUTION INTRAVENOUS CONTINUOUS
Status: DISCONTINUED | OUTPATIENT
Start: 2023-01-12 | End: 2023-01-14 | Stop reason: HOSPADM

## 2023-01-12 RX ORDER — ONDANSETRON 4 MG/1
4 TABLET, ORALLY DISINTEGRATING ORAL EVERY 30 MIN PRN
Status: DISCONTINUED | OUTPATIENT
Start: 2023-01-12 | End: 2023-01-14 | Stop reason: HOSPADM

## 2023-01-12 RX ORDER — ONDANSETRON 2 MG/ML
INJECTION INTRAMUSCULAR; INTRAVENOUS
Status: COMPLETED
Start: 2023-01-12 | End: 2023-01-12

## 2023-01-12 RX ORDER — MISOPROSTOL 200 UG/1
800 TABLET ORAL
Status: DISCONTINUED | OUTPATIENT
Start: 2023-01-12 | End: 2023-01-14 | Stop reason: HOSPADM

## 2023-01-12 RX ORDER — HYDROMORPHONE HCL IN WATER/PF 6 MG/30 ML
0.2 PATIENT CONTROLLED ANALGESIA SYRINGE INTRAVENOUS EVERY 5 MIN PRN
Status: DISCONTINUED | OUTPATIENT
Start: 2023-01-12 | End: 2023-01-12 | Stop reason: HOSPADM

## 2023-01-12 RX ORDER — CEFAZOLIN SODIUM/WATER 2 G/20 ML
2 SYRINGE (ML) INTRAVENOUS SEE ADMIN INSTRUCTIONS
Status: DISCONTINUED | OUTPATIENT
Start: 2023-01-12 | End: 2023-01-13

## 2023-01-12 RX ADMIN — ONDANSETRON 4 MG: 2 INJECTION INTRAMUSCULAR; INTRAVENOUS at 06:49

## 2023-01-12 RX ADMIN — ONDANSETRON 4 MG: 4 TABLET, ORALLY DISINTEGRATING ORAL at 22:59

## 2023-01-12 RX ADMIN — PHENYLEPHRINE HYDROCHLORIDE 0.5 MCG/KG/MIN: 10 INJECTION INTRAVENOUS at 07:33

## 2023-01-12 RX ADMIN — SODIUM CHLORIDE, POTASSIUM CHLORIDE, SODIUM LACTATE AND CALCIUM CHLORIDE: 600; 310; 30; 20 INJECTION, SOLUTION INTRAVENOUS at 19:58

## 2023-01-12 RX ADMIN — FENTANYL CITRATE 15 MCG: 50 INJECTION, SOLUTION INTRAMUSCULAR; INTRAVENOUS at 07:34

## 2023-01-12 RX ADMIN — ACETAMINOPHEN 975 MG: 325 TABLET, FILM COATED ORAL at 06:04

## 2023-01-12 RX ADMIN — ONDANSETRON 4 MG: 4 TABLET, ORALLY DISINTEGRATING ORAL at 15:39

## 2023-01-12 RX ADMIN — Medication 0.15 MG: at 07:34

## 2023-01-12 RX ADMIN — OXYTOCIN-SODIUM CHLORIDE 0.9% IV SOLN 30 UNIT/500ML 340 ML/HR: 30-0.9/5 SOLUTION at 07:50

## 2023-01-12 RX ADMIN — KETOROLAC TROMETHAMINE 30 MG: 30 INJECTION, SOLUTION INTRAMUSCULAR at 08:15

## 2023-01-12 RX ADMIN — BUPIVACAINE HYDROCHLORIDE IN DEXTROSE 13.5 MG: 7.5 INJECTION, SOLUTION SUBARACHNOID at 07:34

## 2023-01-12 RX ADMIN — PHENYLEPHRINE HYDROCHLORIDE 100 MCG: 10 INJECTION INTRAVENOUS at 07:34

## 2023-01-12 RX ADMIN — SODIUM CITRATE AND CITRIC ACID MONOHYDRATE 30 ML: 500; 334 SOLUTION ORAL at 06:50

## 2023-01-12 RX ADMIN — METOCLOPRAMIDE HYDROCHLORIDE 10 MG: 5 INJECTION INTRAMUSCULAR; INTRAVENOUS at 16:43

## 2023-01-12 RX ADMIN — KETOROLAC TROMETHAMINE 30 MG: 30 INJECTION, SOLUTION INTRAMUSCULAR; INTRAVENOUS at 16:45

## 2023-01-12 RX ADMIN — KETOROLAC TROMETHAMINE 30 MG: 30 INJECTION, SOLUTION INTRAMUSCULAR; INTRAVENOUS at 22:59

## 2023-01-12 RX ADMIN — Medication 2 G: at 07:28

## 2023-01-12 RX ADMIN — DEXAMETHASONE SODIUM PHOSPHATE 4 MG: 4 INJECTION, SOLUTION INTRA-ARTICULAR; INTRALESIONAL; INTRAMUSCULAR; INTRAVENOUS; SOFT TISSUE at 07:42

## 2023-01-12 RX ADMIN — ACETAMINOPHEN 975 MG: 325 TABLET, FILM COATED ORAL at 19:15

## 2023-01-12 RX ADMIN — SODIUM CHLORIDE, POTASSIUM CHLORIDE, SODIUM LACTATE AND CALCIUM CHLORIDE: 600; 310; 30; 20 INJECTION, SOLUTION INTRAVENOUS at 08:05

## 2023-01-12 RX ADMIN — SODIUM CHLORIDE, SODIUM LACTATE, POTASSIUM CHLORIDE, CALCIUM CHLORIDE AND DEXTROSE MONOHYDRATE: 5; 600; 310; 30; 20 INJECTION, SOLUTION INTRAVENOUS at 11:31

## 2023-01-12 RX ADMIN — SODIUM CHLORIDE, POTASSIUM CHLORIDE, SODIUM LACTATE AND CALCIUM CHLORIDE: 600; 310; 30; 20 INJECTION, SOLUTION INTRAVENOUS at 05:59

## 2023-01-12 RX ADMIN — ACETAMINOPHEN 975 MG: 325 TABLET, FILM COATED ORAL at 11:40

## 2023-01-12 ASSESSMENT — ACTIVITIES OF DAILY LIVING (ADL)
ADLS_ACUITY_SCORE: 18
ADLS_ACUITY_SCORE: 21
ADLS_ACUITY_SCORE: 18
ADLS_ACUITY_SCORE: 25
ADLS_ACUITY_SCORE: 18
ADLS_ACUITY_SCORE: 21
ADLS_ACUITY_SCORE: 18

## 2023-01-12 NOTE — ANESTHESIA PREPROCEDURE EVALUATION
Anesthesia Pre-Procedure Evaluation    Patient: Yary Muñoz   MRN: [id number] : 1990        Procedure : Procedure(s):  REPEAT  SECTION          Past Medical History:   Diagnosis Date     Hypothyroid     irregular period, weight gain, family history     PONV (postoperative nausea and vomiting)       Past Surgical History:   Procedure Laterality Date      SECTION N/A 2019    Procedure:  SECTION;  Surgeon: Enrique Delgado MD;  Location: [facility] OR      SECTION N/A 2020    Procedure: REPEAT  SECTION;  Surgeon: Neris Nur DO;  Location: [facility] L+D     HC TOOTH EXTRACTION W/FORCEP        Allergies   Allergen Reactions     No Known Drug Allergies       Social History     Tobacco Use     Smoking status: Never     Smokeless tobacco: Never   Substance Use Topics     Alcohol use: No      Wt Readings from Last 1 Encounters:   23 66.7 kg (147 lb)        Anesthesia Evaluation            ROS/MED HX  ENT/Pulmonary:  - neg pulmonary ROS     Neurologic:  - neg neurologic ROS     Cardiovascular:  - neg cardiovascular ROS     METS/Exercise Tolerance:     Hematologic:  - neg hematologic  ROS     Musculoskeletal:  - neg musculoskeletal ROS     GI/Hepatic:  - neg GI/hepatic ROS     Renal/Genitourinary:  - neg Renal ROS     Endo:     (+) thyroid problem,     Psychiatric/Substance Use:  - neg psychiatric ROS     Infectious Disease:  - neg infectious disease ROS     Malignancy:  - neg malignancy ROS     Other: Comment: .Lab Test        01/12/23     10/17/22     06/28/22     01/19/21                       0558          1657          1120          1105          WBC           --          7.5          6.9          7.2           HGB          11.2*        13.0         13.8         14.0          MCV           --          90           87           88            PLT           --          226          233          281            No lab results found.             Physical  Exam    Airway        Mallampati: II    Neck ROM: full     Respiratory Devices and Support         Dental           Cardiovascular   cardiovascular exam normal       Rhythm and rate: regular     Pulmonary   pulmonary exam normal                OUTSIDE LABS:  CBC:   Lab Results   Component Value Date    WBC 7.5 10/17/2022    WBC 6.9 06/28/2022    HGB 11.2 (L) 01/12/2023    HGB 13.0 10/17/2022    HCT 37.9 10/17/2022    HCT 39.9 06/28/2022     10/17/2022     06/28/2022     BMP: No results found for: NA, POTASSIUM, CHLORIDE, CO2, BUN, CR, GLC  COAGS: No results found for: PTT, INR, FIBR  POC: No results found for: BGM, HCG, HCGS  HEPATIC: No results found for: ALBUMIN, PROTTOTAL, ALT, AST, GGT, ALKPHOS, BILITOTAL, BILIDIRECT, LUNA  OTHER:   Lab Results   Component Value Date    TSH 2.54 10/17/2022    T4 1.04 06/28/2022       Anesthesia Plan    ASA Status:  2      Anesthesia Type: Spinal.              Consents    Anesthesia Plan(s) and associated risks, benefits, and realistic alternatives discussed. Questions answered and patient/representative(s) expressed understanding.    - Discussed:     - Discussed with:  Patient         Postoperative Care       PONV prophylaxis: Ondansetron (or other 5HT-3), Dexamethasone or Solumedrol     Comments:                Jorge Luis Christina DO

## 2023-01-12 NOTE — PLAN OF CARE
Patient reports with prior  deliveries had nausea and vomiting following her spinal. Updated Dr Wetzel regarding the above. Orders received for Zofran 4 mg IV now.

## 2023-01-12 NOTE — LACTATION NOTE
Lactation visit. Baby at breast at time. Good latch noted. Baby needing some stimulation to stay active at breast. Third baby for Yary. No concerns with her other children, nursed both for a year. Reviewed supply and demand, importance of nursing every 2-3 hours, first 24 hour feeding behaviors and beyond. All questions answered at this time. Knows lactation available if needed.

## 2023-01-12 NOTE — BRIEF OP NOTE
[facility]    Brief Operative Note    Pre-operative diagnosis: Previous  delivery, antepartum condition or complication [O34.219]  Post-operative diagnosis 31 y/o  @ 39 4/7 weeks EGA s/p Repeat  section, skin tag removal    Procedure: Procedure(s):  REPEAT  SECTION  Surgeon: Surgeon(s) and Role:     * Neris Nur DO - Primary  Anesthesia: Spinal   Estimated Blood Loss: see QBL    Drains: None  Specimens:   ID Type Source Tests Collected by Time Destination   A :  Placenta Placenta SPECIMEN DISCARDED, OR DOCUMENTATION ONLY Neris Nur DO 2023  7:54 AM      Findings:   male infant 8# 9oz, apgars 9, 10  normal uterus, ovaries, fallopian tubes, 2 skin tags removed.  Complications: None.  Implants: * No implants in log *

## 2023-01-12 NOTE — PLAN OF CARE
Data: Yary Muñoz transferred to 422 via cart at 1115. Baby transferred via parent's arms.  Action: Receiving unit notified of transfer: Yes. Patient and family notified of room change. Continuing to care for pt after transfer. Belongings sent to receiving unit. Accompanied by Registered Nurse. Oriented patient to surroundings. Call light within reach. ID bands double-checked with Charge RN.  Response: Patient tolerated transfer and is stable.

## 2023-01-12 NOTE — ANESTHESIA CARE TRANSFER NOTE
Patient: Yary Muñoz    Procedure: Procedure(s):  REPEAT  SECTION       Diagnosis: Previous  delivery, antepartum condition or complication [O34.219]  Diagnosis Additional Information: No value filed.    Anesthesia Type:   Spinal     Note:    Oropharynx: oropharynx clear of all foreign objects  Level of Consciousness: awake  Oxygen Supplementation: room air    Independent Airway: airway patency satisfactory and stable  Dentition: dentition unchanged  Vital Signs Stable: post-procedure vital signs reviewed and stable  Report to RN Given: handoff report given  Patient transferred to: Labor and Delivery    Handoff Report: Identifed the Patient, Identified the Reponsible Provider, Reviewed the pertinent medical history, Discussed the surgical course, Reviewed Intra-OP anesthesia mangement and issues during anesthesia, Set expectations for post-procedure period and Allowed opportunity for questions and acknowledgement of understanding      Vitals:  Vitals Value Taken Time   /62 23 0840   Temp     Pulse     Resp     SpO2 98 % 23 0840   Vitals shown include unvalidated device data.    Electronically Signed By: Dean Dennis Severson, APRN CRNA  2023  8:42 AM

## 2023-01-12 NOTE — PLAN OF CARE
Returned to MAC # 1 at 0839 following a scheduled repeat . Incision is covered with a Telfa dressing and Tegaderm, which are dry and intact. Fundus is firm @ u/2 with scant rubra. Denies any pain or nausea. Baby placed skin to skin and is breast feeding well.

## 2023-01-12 NOTE — OP NOTE
PREOPERATIVE DIAGNOSIS: A 32 year old-year-old  at 39w4d weeks estimated gestational age admitted for repeat  section and skin tag removal.   POSTOPERATIVE DIAGNOSES: A 32 year old-year-old  at 39w4d weeks estimated gestational age admitted for repeat  section and skin tag removal.   PROCEDURE:   1. Repeat low transverse  section.   2. Skin tag removal.   3. Seprafilm placement for adhesiolysis.   COMPLICATIONS: None apparent at time of procedure.   ESTIMATED BLOOD LOSS: 473  mL.   SURGEON: Neris Nur DO.   INDICATIONS: A 32 year old-year-old  at 39w4d weeks estimated gestational age admitted for repeat  section and skin tag removal.  Patient signed consent for repeat  section and skin tag removal, risks benefits, alternatives are discussed with the patient.    OUTCOME: Male infant 8# 9oz apgars 9 at 1 minute and 10 at 5 minutes.   Findings:   Normal uterus, tubes and ovaries.   DETAILS OF PROCEDURE: After informed consent was signed, the patient was placed in dorsal supine position, spinal placed for anesthesia. Fetal heart tones were obtained and found to be in 150s. The patient was prepped and draped in normal sterile fashion and a time out was performed. Pre-operative antibiotics were given. Adequate anesthesia was reported per patient after the spinal was dosed to a surgical level.  A pfannensteil incision was then carried down to the underlying fascia and incised in the midline. The fascia is dissected off the rectus muscles superiorly and inferiorly. Blunt/sharp dissection of the peritoneum was performed and blunt stretching of the muscles was perfomed. The uterus was visualized.  An ana retractor is placed. The vesicouterine peritoneum was visualized. The bladder flap was created.  The lower uterine segment was incised with a scalpel. Blunt stretching was performed and the baby delivered atraumatically.  The nose and mouth were bulb  suctioned. The cord was clamped and cut after 1 minute.  Infant was taken over to the waiting  staff. The placenta spontaneously delivered. The uterus was cleared of all clots and debris. The placenta was examined and found to be complete.  The lower uterine segment was reapproximated with 0 Monocryl in a running locked fashion. A second layer of the same suture was used for imbrication. The bladder flap was recreated using 3-0 Monocryl. The ana retractor is removed. Irrigation was performed. Seprafilm was placed over the bladder flap. Hemostasis was noted. The peritoneum was closed with 3-0 monocryl. The rectus muscles were reapproximated in the midline and closed with several horizontal mattress sutures of 3-0 monocryl  Hemostasis was assured with Bovie cautery on the rectus muscles, and Seprafilm was placed over it. The fascia was reapproximated with 0 looped PDS in a running fashion with good reapproximation. The subcutaneous space adhesions were released.  Hemostasis was assured with cautery. A subcuticular stitch using 4-0 Monocryl was used to reapproximate the skin. Steri-Strips, telfa, and tegaderm was applied. The left abdominal skin tag was removed by resecting the skin tag at the base with a metzenbaum scissors and the skin was closed with 4-0 monocryl.  The right vulvar skin tag was removed by resecting the skin tag at the base with a metzenbaum scissors and the skin was closed with 4-0 monocryl. Hemostasis was occurred. The patient tolerated the procedure well. Sponge, lap and needle counts were correct x2.   JON COOMBS DO

## 2023-01-12 NOTE — PLAN OF CARE
Yary is admitted to MAC # 1 for a scheduled repeat . EFM monitors explained and placed x's 2. Baseline , accelerations and FM are present. Procedure was explained and questions answered. Informed consent was signed and signature witnessed. Prepped for surgery.

## 2023-01-12 NOTE — ANESTHESIA POSTPROCEDURE EVALUATION
Patient: Yary Muñoz    Procedure: Procedure(s):  REPEAT  SECTION       Anesthesia Type:  Spinal    Note:  Disposition: Inpatient   Postop Pain Control: Uneventful            Sign Out: Well controlled pain   PONV: No   Neuro/Psych: Uneventful            Sign Out: Acceptable/Baseline neuro status   Airway/Respiratory: Uneventful            Sign Out: Acceptable/Baseline resp. status   CV/Hemodynamics: Uneventful            Sign Out: Acceptable CV status; No obvious hypovolemia; No obvious fluid overload   Other NRE: NONE   DID A NON-ROUTINE EVENT OCCUR? No           Last vitals:  Vitals Value Taken Time   /56 23 1044   Temp     Pulse     Resp 16 23 1000   SpO2 96 % 23 1046   Vitals shown include unvalidated device data.    Electronically Signed By: Eric Starks MD  2023  5:09 PM

## 2023-01-13 LAB — HGB BLD-MCNC: 9.7 G/DL (ref 11.7–15.7)

## 2023-01-13 PROCEDURE — 258N000003 HC RX IP 258 OP 636: Performed by: ANESTHESIOLOGY

## 2023-01-13 PROCEDURE — 36415 COLL VENOUS BLD VENIPUNCTURE: CPT | Performed by: FAMILY MEDICINE

## 2023-01-13 PROCEDURE — 250N000013 HC RX MED GY IP 250 OP 250 PS 637: Performed by: FAMILY MEDICINE

## 2023-01-13 PROCEDURE — 250N000011 HC RX IP 250 OP 636: Performed by: FAMILY MEDICINE

## 2023-01-13 PROCEDURE — 120N000001 HC R&B MED SURG/OB

## 2023-01-13 PROCEDURE — 85018 HEMOGLOBIN: CPT | Performed by: FAMILY MEDICINE

## 2023-01-13 RX ADMIN — IBUPROFEN 800 MG: 800 TABLET, FILM COATED ORAL at 17:23

## 2023-01-13 RX ADMIN — IBUPROFEN 800 MG: 800 TABLET, FILM COATED ORAL at 23:34

## 2023-01-13 RX ADMIN — ACETAMINOPHEN 975 MG: 325 TABLET, FILM COATED ORAL at 14:12

## 2023-01-13 RX ADMIN — ACETAMINOPHEN 975 MG: 325 TABLET, FILM COATED ORAL at 01:43

## 2023-01-13 RX ADMIN — ACETAMINOPHEN 975 MG: 325 TABLET, FILM COATED ORAL at 20:10

## 2023-01-13 RX ADMIN — ACETAMINOPHEN 975 MG: 325 TABLET, FILM COATED ORAL at 07:58

## 2023-01-13 RX ADMIN — SENNOSIDES AND DOCUSATE SODIUM 1 TABLET: 50; 8.6 TABLET ORAL at 07:58

## 2023-01-13 RX ADMIN — IBUPROFEN 800 MG: 800 TABLET, FILM COATED ORAL at 11:16

## 2023-01-13 RX ADMIN — SENNOSIDES AND DOCUSATE SODIUM 1 TABLET: 50; 8.6 TABLET ORAL at 20:10

## 2023-01-13 RX ADMIN — SODIUM CHLORIDE, POTASSIUM CHLORIDE, SODIUM LACTATE AND CALCIUM CHLORIDE: 600; 310; 30; 20 INJECTION, SOLUTION INTRAVENOUS at 04:38

## 2023-01-13 RX ADMIN — KETOROLAC TROMETHAMINE 30 MG: 30 INJECTION, SOLUTION INTRAMUSCULAR; INTRAVENOUS at 04:41

## 2023-01-13 ASSESSMENT — ACTIVITIES OF DAILY LIVING (ADL)
ADLS_ACUITY_SCORE: 21
ADLS_ACUITY_SCORE: 25
ADLS_ACUITY_SCORE: 25
ADLS_ACUITY_SCORE: 21
ADLS_ACUITY_SCORE: 21
ADLS_ACUITY_SCORE: 25
ADLS_ACUITY_SCORE: 21
ADLS_ACUITY_SCORE: 25

## 2023-01-13 NOTE — PLAN OF CARE
Data: Vital signs within normal limits. Postpartum checks within normal limits - see flow record. Patient eating and drinking normally. Patient able to empty bladder independently and is up ambulating. Patient performing self cares, is able to care for infant and is breastfeeding independently every 2-3 hours.  section incision is covered; unable to visualize. Drainage was traced overnight. No additional drainage noted; it did not cross the tracing. Rested in bed this shift.     Action: Patient medicated during the shift for minimal pain. See MAR. Patient education done, see flow record. Questions encouraged. All questions answered.    Response: Posiitive attachment behaviors observed with infant. Patient's  present this shift.     Plan: Continue current plan of care.

## 2023-01-13 NOTE — PLAN OF CARE
VSS on room air. Up with assist of one. Patient ambulated to bathroom x1 with writer and patient tolerated it well. Pt fearful of removing borges due to hx of being straight cathed multiple times after last . Patient was also extremely nauseous and had two emesis at beginning of shift at 3p. Multiple interventions used and Zofran and Reglan given with some relief. Has not been nauseous since . LR still infusing due to nausea. C- section incision is dressed with a Telfa dressing, old drainage marked with no change. Lochia scant, very few small clots after ambulating for first time. Pt had a skin tear removed during c- section but patient denies pain at perineum. Using a ambika bottle for comfort. Fundus firm and midline. Pain well managed with Toradol and Tylenol. Pain reported at 2/10. Breastfeeding, tolerating well. FOB supportive and at bedside. Bonding well with infant. Independent with infant cares.

## 2023-01-13 NOTE — PLAN OF CARE
Data: Vital signs within normal limits. Postpartum checks within normal limits - see flow record. Patient drinking fluids. IV fluids stopped at 0640. Borges pulled at 0635. Hx of mult straight cath after delivery needing longer time with borges in place. Due to void. No apparent signs of infection. Patient performing self cares, is able to care for infant and is breast feeding every 2-3 hours.  Incision appears within normal small amount of additional drainage marked. Is using toradol and tylenol for discomfort.  Rested in bed this shift.  Action: Patient medicated during the shift for pain. See MAR. Patient reassessed within 1 hour after each medication and pain was improved - patient stated she was comfortable. Patient education done, see flow record.  Response: Positive attachment behaviors observed with infant. Patient's spouce present this shift.   Plan: Continue current plan of care.  Anticipate discharge on 1/14.

## 2023-01-13 NOTE — PROGRESS NOTES
"[facility] OB/GYN Postop C/S Note    S:  Patient without complaints other than typical soreness.  Minimal lochia.  Flatus passed, tolerating Regular diet well    O:  Blood pressure 109/64, pulse 60, temperature 98.5  F (36.9  C), temperature source Oral, resp. rate 17, height 1.549 m (5' 1\"), weight 66.7 kg (147 lb), last menstrual period 04/01/2022, SpO2 98 %, unknown if currently breastfeeding.            Intake/Output Summary (Last 24 hours) at 1/13/2023 0750  Last data filed at 1/13/2023 0633  Gross per 24 hour   Intake 1240 ml   Output 1948 ml   Net -708 ml           Abdomen - Non-distended, Normoactive bowel sounds, soft, appropriately tender; Fundus firm, at umbilicus, nontender        Dressing/Incision - clean, dry, intact        Extremities - No calf tenderness    Hemoglobin   Date Value Ref Range Status   01/13/2023 9.7 (L) 11.7 - 15.7 g/dL Final   01/12/2023 11.2 (L) 11.7 - 15.7 g/dL Final         A:   Postop Day# 1, s/p Repeat LTCS - doing well        Good urine output        Acute anemia due to blood loss and dilution- due to typical intraop blood loss, no sx's, not clinically significant.  No specific Rx required in hospital.      P:  1)  Routine care        2)  Ambulate        3)  Probable D/C 1-2 days.      Stanford Blum MD            "

## 2023-01-14 VITALS
SYSTOLIC BLOOD PRESSURE: 106 MMHG | OXYGEN SATURATION: 98 % | BODY MASS INDEX: 27.75 KG/M2 | RESPIRATION RATE: 16 BRPM | WEIGHT: 147 LBS | TEMPERATURE: 98.3 F | HEIGHT: 61 IN | DIASTOLIC BLOOD PRESSURE: 56 MMHG | HEART RATE: 75 BPM

## 2023-01-14 PROCEDURE — 250N000013 HC RX MED GY IP 250 OP 250 PS 637: Performed by: FAMILY MEDICINE

## 2023-01-14 RX ADMIN — ACETAMINOPHEN 975 MG: 325 TABLET, FILM COATED ORAL at 04:26

## 2023-01-14 RX ADMIN — SENNOSIDES AND DOCUSATE SODIUM 1 TABLET: 50; 8.6 TABLET ORAL at 10:26

## 2023-01-14 RX ADMIN — IBUPROFEN 800 MG: 800 TABLET, FILM COATED ORAL at 06:19

## 2023-01-14 RX ADMIN — ACETAMINOPHEN 975 MG: 325 TABLET, FILM COATED ORAL at 10:26

## 2023-01-14 ASSESSMENT — ACTIVITIES OF DAILY LIVING (ADL)
ADLS_ACUITY_SCORE: 21

## 2023-01-14 NOTE — LACTATION NOTE
Lactation in to follow up. Baby at breast at that time. Tight latch noted, writer pulled down bottom lip. Yary states no nipple discomfort, and feels like breastfeeding going well. Encouraged to call for assistance.

## 2023-01-14 NOTE — PLAN OF CARE
Vitals remained stable. Postpartum checks within normal limits. Pain controlled with tylenol and ibuprofen. Ambulating independently, voiding spontaneously. Bonding well with baby. FOB/spouse at bedside and attentive to patient. Breastfeeding every 2-3 hours.

## 2023-01-14 NOTE — PLAN OF CARE
Discharge instructions completed.  Patient states she understands all discharge instructions and all her questions have been answered.  Verbalizes when she needs to return to clinic for follow up for herself (6 weeks) and baby (48-72 hours).  She is caring for herself and her baby independently.  No prescriptions to review . Patient states she has medications at home.  Postpartum depression symptoms reviewed and encouraged frequent review of depression scale.

## 2023-01-14 NOTE — DISCHARGE SUMMARY
[facility] Obstetrics Post-Op / Progress Note         Assessment and Plan:    Assessment:   Post-operative day #2  Low transverse repeat  section  L&D complications: Scheduled, repeat  section      Doing well.  Clean wound without signs of infection.  No immediate surgical complications identified.  No excessive bleeding  Pain well-controlled.  Mild, asymptomatic ABLA.  No treatment indicated      Plan:   Discharge later today  Follow up 6 weeks           Interval History:   Doing well.  Pain is well-controlled.  No fevers.  No history of wound drainage, warmth or significant erythema.  Good appetite.  Denies chest pain, shortness of breath, nausea or vomiting.  Ambulatory.  Breastfeeding well.          Significant Problems:      Past Medical History:   Diagnosis Date     Hypothyroid     irregular period, weight gain, family history     PONV (postoperative nausea and vomiting)              Review of Systems:    The patient denies any chest pain, shortness of breath, excessive pain, fever, chills, purulent drainage from the wound, nausea or vomiting.          Medications:   All medications related to the patient's surgery have been reviewed          Physical Exam:     All vitals stable  Patient Vitals for the past 12 hrs:   BP Temp Temp src Pulse Resp   23 2335 101/53 97.6  F (36.4  C) Oral 68 16     Wound clean and dry with minimal or no drainage.  Surrounding skin with minimal erythema.  Ext NTw/o edema          Data:     Hemoglobin   Date Value Ref Range Status   2023 9.7 (L) 11.7 - 15.7 g/dL Final   2023 11.2 (L) 11.7 - 15.7 g/dL Final   10/17/2022 13.0 11.7 - 15.7 g/dL Final   2022 13.8 11.7 - 15.7 g/dL Final   2021 14.0 11.7 - 15.7 g/dL Final   2020 11.1 (L) 11.7 - 15.7 g/dL Final   2020 13.2 11.7 - 15.7 g/dL Final   2020 13.0 11.7 - 15.7 g/dL Final   2020 13.4 11.7 - 15.7 g/dL Final     -    Reji Jackson MD  2023 7:32  AM

## 2023-01-19 DIAGNOSIS — E03.9 HYPOTHYROIDISM, UNSPECIFIED TYPE: ICD-10-CM

## 2023-01-19 RX ORDER — LEVOTHYROXINE SODIUM 150 UG/1
TABLET ORAL
Qty: 90 TABLET | Refills: 2 | Status: SHIPPED | OUTPATIENT
Start: 2023-01-19 | End: 2024-06-19

## 2023-03-23 ENCOUNTER — MEDICAL CORRESPONDENCE (OUTPATIENT)
Dept: HEALTH INFORMATION MANAGEMENT | Facility: CLINIC | Age: 33
End: 2023-03-23

## 2023-03-23 ENCOUNTER — PRENATAL OFFICE VISIT (OUTPATIENT)
Dept: OBGYN | Facility: CLINIC | Age: 33
End: 2023-03-23
Payer: COMMERCIAL

## 2023-03-23 VITALS — BODY MASS INDEX: 23.3 KG/M2 | DIASTOLIC BLOOD PRESSURE: 62 MMHG | SYSTOLIC BLOOD PRESSURE: 98 MMHG | WEIGHT: 123.3 LBS

## 2023-03-23 DIAGNOSIS — E03.9 HYPOTHYROIDISM, UNSPECIFIED TYPE: ICD-10-CM

## 2023-03-23 DIAGNOSIS — Z12.4 SCREENING FOR CERVICAL CANCER: ICD-10-CM

## 2023-03-23 LAB — TSH SERPL DL<=0.005 MIU/L-ACNC: 0.03 UIU/ML (ref 0.3–4.2)

## 2023-03-23 PROCEDURE — 99207 PR POST PARTUM EXAM: CPT | Performed by: OBSTETRICS & GYNECOLOGY

## 2023-03-23 PROCEDURE — 84443 ASSAY THYROID STIM HORMONE: CPT | Performed by: OBSTETRICS & GYNECOLOGY

## 2023-03-23 PROCEDURE — 84439 ASSAY OF FREE THYROXINE: CPT | Performed by: OBSTETRICS & GYNECOLOGY

## 2023-03-23 PROCEDURE — 87624 HPV HI-RISK TYP POOLED RSLT: CPT | Performed by: OBSTETRICS & GYNECOLOGY

## 2023-03-23 PROCEDURE — 36415 COLL VENOUS BLD VENIPUNCTURE: CPT | Performed by: OBSTETRICS & GYNECOLOGY

## 2023-03-23 PROCEDURE — G0145 SCR C/V CYTO,THINLAYER,RESCR: HCPCS | Performed by: OBSTETRICS & GYNECOLOGY

## 2023-03-23 ASSESSMENT — PATIENT HEALTH QUESTIONNAIRE - PHQ9: SUM OF ALL RESPONSES TO PHQ QUESTIONS 1-9: 1

## 2023-03-23 NOTE — PROGRESS NOTES
SUBJECTIVE: Yary is here for a 6-week postpartum checkup.    Delivery date was 01/12/23. She had a repeat c/s of a viable boy, weight 8 pounds 8 oz, with no complications.  Since delivery, she has been breast feeding.  She has no signs of infection, bleeding or other complications.  She is not pregnant.  We discussed contraceptions and she has chosen condoms and natural family planning.  She  has had intercourse since delivery and complains of no discomfort. Patient screened for postpartum depression and complaints are NEGATIVE. Screening has also been completed for intimate partner violence.    EXAM:  Today's Depression Rating was   PHQ-9 SCORE 1/19/2021   PHQ-9 Total Score 1       GENERAL healthy, alert and no distress  PELVIS: EGBUS within normal limits.  On speculum exam, cervix appears normal.  ThinPrep Pap smear performed.    ASSESSMENT:   Normal postpartum exam after repeat c/s.    PLAN:  Return as needed or at time of next expected pap, pelvic, or breast exam.  History of Hashimoto's, on levothyroxine.  We will check TSH with reflex today.  Discussed identifying a primary care provider to manage this in the future when she is not pregnant.    Lizeth Melchor MD  [facility] Obstetrics and Gynecology

## 2023-03-24 LAB — T4 FREE SERPL-MCNC: 2.17 NG/DL (ref 0.9–1.7)

## 2023-03-27 LAB
BKR LAB AP GYN ADEQUACY: NORMAL
BKR LAB AP GYN INTERPRETATION: NORMAL
BKR LAB AP HPV REFLEX: NORMAL
BKR LAB AP PREVIOUS ABNORMAL: NORMAL
PATH REPORT.COMMENTS IMP SPEC: NORMAL
PATH REPORT.COMMENTS IMP SPEC: NORMAL
PATH REPORT.RELEVANT HX SPEC: NORMAL

## 2023-03-29 LAB
HUMAN PAPILLOMA VIRUS 16 DNA: NEGATIVE
HUMAN PAPILLOMA VIRUS 18 DNA: NEGATIVE
HUMAN PAPILLOMA VIRUS FINAL DIAGNOSIS: NORMAL
HUMAN PAPILLOMA VIRUS OTHER HR: NEGATIVE

## 2023-04-03 DIAGNOSIS — E03.9 HYPOTHYROIDISM, UNSPECIFIED TYPE: Primary | ICD-10-CM

## 2023-04-03 RX ORDER — LEVOTHYROXINE SODIUM 125 UG/1
125 TABLET ORAL DAILY
Qty: 90 TABLET | Refills: 1 | Status: SHIPPED | OUTPATIENT
Start: 2023-04-03 | End: 2023-11-17

## 2023-06-01 ENCOUNTER — HEALTH MAINTENANCE LETTER (OUTPATIENT)
Age: 33
End: 2023-06-01

## 2023-07-07 ENCOUNTER — LAB (OUTPATIENT)
Dept: LAB | Facility: CLINIC | Age: 33
End: 2023-07-07
Payer: COMMERCIAL

## 2023-07-07 DIAGNOSIS — E03.9 HYPOTHYROIDISM, UNSPECIFIED TYPE: ICD-10-CM

## 2023-07-07 LAB — TSH SERPL DL<=0.005 MIU/L-ACNC: 0.43 UIU/ML (ref 0.3–4.2)

## 2023-07-07 PROCEDURE — 36415 COLL VENOUS BLD VENIPUNCTURE: CPT

## 2023-07-07 PROCEDURE — 84443 ASSAY THYROID STIM HORMONE: CPT

## 2023-11-17 DIAGNOSIS — E03.9 HYPOTHYROIDISM, UNSPECIFIED TYPE: ICD-10-CM

## 2023-11-17 RX ORDER — LEVOTHYROXINE SODIUM 125 UG/1
125 TABLET ORAL DAILY
Qty: 90 TABLET | Refills: 1 | Status: SHIPPED | OUTPATIENT
Start: 2023-11-17 | End: 2024-05-20

## 2023-11-17 NOTE — TELEPHONE ENCOUNTER
Routing refill request to provider for review/approval because: patient is 10 months postpartum, was advised at 6 week postpartum to establish care with PCP to manage.    Drug not on the [facility] refill protocol     Kathi MCKEON RN

## 2024-05-18 DIAGNOSIS — E03.9 HYPOTHYROIDISM, UNSPECIFIED TYPE: ICD-10-CM

## 2024-05-20 RX ORDER — LEVOTHYROXINE SODIUM 125 UG/1
125 TABLET ORAL DAILY
Qty: 90 TABLET | Refills: 0 | Status: SHIPPED | OUTPATIENT
Start: 2024-05-20 | End: 2024-06-19

## 2024-05-20 NOTE — TELEPHONE ENCOUNTER
Requested Prescriptions   Pending Prescriptions Disp Refills    levothyroxine (SYNTHROID/LEVOTHROID) 125 MCG tablet [Pharmacy Med Name: LEVOTHYROXINE 125 MCG TABLET] 90 tablet 1     Sig: TAKE 1 TABLET BY MOUTH EVERY DAY       Thyroid Protocol Failed - 5/18/2024  7:45 AM        Failed - Recent (12 mo) or future (30 days) visit within the authorizing provider's specialty     The patient must have completed an in-person or virtual visit within the past 12 months or has a future visit scheduled within the next 90 days with the authorizing provider s specialty.  Urgent care and e-visits do not quality as an office visit for this protocol.          Passed - Patient is 12 years or older        Passed - Medication is active on med list        Passed - Medication indicated for associated diagnosis     Medication is associated with one or more of the following diagnoses:  Hypothyroidism  Thyroid stimulating hormone suppression therapy  Thyroid cancer          Passed - Normal TSH on file in past 12 months     Recent Labs   Lab Test 07/07/23  1530   TSH 0.43              Passed - No active pregnancy on record     If patient is pregnant or has had a positive pregnancy test, please check TSH.          Passed - No positive pregnancy test in past 12 months     If patient is pregnant or has had a positive pregnancy test, please check TSH.             Has appt 6/19. Sent a month in case she runs out prior.    Jessica KENT RN BSN

## 2024-06-19 ENCOUNTER — OFFICE VISIT (OUTPATIENT)
Dept: FAMILY MEDICINE | Facility: CLINIC | Age: 34
End: 2024-06-19
Payer: COMMERCIAL

## 2024-06-19 VITALS
HEIGHT: 61 IN | DIASTOLIC BLOOD PRESSURE: 61 MMHG | RESPIRATION RATE: 10 BRPM | BODY MASS INDEX: 23.22 KG/M2 | OXYGEN SATURATION: 99 % | WEIGHT: 123 LBS | HEART RATE: 82 BPM | TEMPERATURE: 97.9 F | SYSTOLIC BLOOD PRESSURE: 118 MMHG

## 2024-06-19 DIAGNOSIS — Z13.21 ENCOUNTER FOR VITAMIN DEFICIENCY SCREENING: ICD-10-CM

## 2024-06-19 DIAGNOSIS — Z13.1 SCREENING FOR DIABETES MELLITUS: ICD-10-CM

## 2024-06-19 DIAGNOSIS — Z13.0 SCREENING FOR DEFICIENCY ANEMIA: ICD-10-CM

## 2024-06-19 DIAGNOSIS — E06.3 HYPOTHYROIDISM DUE TO HASHIMOTO'S THYROIDITIS: ICD-10-CM

## 2024-06-19 DIAGNOSIS — Z12.83 SCREENING FOR SKIN CANCER: ICD-10-CM

## 2024-06-19 DIAGNOSIS — Z13.220 LIPID SCREENING: ICD-10-CM

## 2024-06-19 DIAGNOSIS — Z00.00 ROUTINE GENERAL MEDICAL EXAMINATION AT A HEALTH CARE FACILITY: Primary | ICD-10-CM

## 2024-06-19 PROBLEM — E03.9 HYPOTHYROIDISM, UNSPECIFIED TYPE: Status: ACTIVE | Noted: 2020-08-28

## 2024-06-19 PROBLEM — O35.8XX0 UMBILICAL VEIN ABNORMALITY COMPLICATING PREGNANCY: Status: RESOLVED | Noted: 2022-12-27 | Resolved: 2024-06-19

## 2024-06-19 PROBLEM — O34.219 PREVIOUS CESAREAN DELIVERY, ANTEPARTUM CONDITION OR COMPLICATION: Status: RESOLVED | Noted: 2020-08-28 | Resolved: 2024-06-19

## 2024-06-19 PROBLEM — O99.283 HYPOTHYROIDISM AFFECTING PREGNANCY IN THIRD TRIMESTER: Status: RESOLVED | Noted: 2020-08-28 | Resolved: 2024-06-19

## 2024-06-19 PROBLEM — Z98.891 HISTORY OF CESAREAN SECTION: Status: RESOLVED | Noted: 2020-05-07 | Resolved: 2024-06-19

## 2024-06-19 PROBLEM — E03.9 HYPOTHYROIDISM AFFECTING PREGNANCY IN THIRD TRIMESTER: Status: RESOLVED | Noted: 2020-08-28 | Resolved: 2024-06-19

## 2024-06-19 LAB
ALBUMIN SERPL BCG-MCNC: 4.6 G/DL (ref 3.5–5.2)
ALP SERPL-CCNC: 41 U/L (ref 40–150)
ALT SERPL W P-5'-P-CCNC: 14 U/L (ref 0–50)
ANION GAP SERPL CALCULATED.3IONS-SCNC: 10 MMOL/L (ref 7–15)
AST SERPL W P-5'-P-CCNC: 29 U/L (ref 0–45)
BILIRUB SERPL-MCNC: 0.3 MG/DL
BUN SERPL-MCNC: 12.7 MG/DL (ref 6–20)
CALCIUM SERPL-MCNC: 9.5 MG/DL (ref 8.6–10)
CHLORIDE SERPL-SCNC: 104 MMOL/L (ref 98–107)
CHOLEST SERPL-MCNC: 176 MG/DL
CREAT SERPL-MCNC: 0.78 MG/DL (ref 0.51–0.95)
DEPRECATED HCO3 PLAS-SCNC: 23 MMOL/L (ref 22–29)
EGFRCR SERPLBLD CKD-EPI 2021: >90 ML/MIN/1.73M2
ERYTHROCYTE [DISTWIDTH] IN BLOOD BY AUTOMATED COUNT: 12.5 % (ref 10–15)
FASTING STATUS PATIENT QL REPORTED: YES
FASTING STATUS PATIENT QL REPORTED: YES
GLUCOSE SERPL-MCNC: 96 MG/DL (ref 70–99)
HCT VFR BLD AUTO: 39.2 % (ref 35–47)
HDLC SERPL-MCNC: 59 MG/DL
HGB BLD-MCNC: 13.4 G/DL (ref 11.7–15.7)
LDLC SERPL CALC-MCNC: 104 MG/DL
MCH RBC QN AUTO: 28.8 PG (ref 26.5–33)
MCHC RBC AUTO-ENTMCNC: 34.2 G/DL (ref 31.5–36.5)
MCV RBC AUTO: 84 FL (ref 78–100)
NONHDLC SERPL-MCNC: 117 MG/DL
PLATELET # BLD AUTO: 252 10E3/UL (ref 150–450)
POTASSIUM SERPL-SCNC: 4 MMOL/L (ref 3.4–5.3)
PROT SERPL-MCNC: 7.4 G/DL (ref 6.4–8.3)
RBC # BLD AUTO: 4.65 10E6/UL (ref 3.8–5.2)
SODIUM SERPL-SCNC: 137 MMOL/L (ref 135–145)
TRIGL SERPL-MCNC: 64 MG/DL
TSH SERPL DL<=0.005 MIU/L-ACNC: 0.45 UIU/ML (ref 0.3–4.2)
VIT D+METAB SERPL-MCNC: 48 NG/ML (ref 20–50)
WBC # BLD AUTO: 4.8 10E3/UL (ref 4–11)

## 2024-06-19 PROCEDURE — 36415 COLL VENOUS BLD VENIPUNCTURE: CPT | Performed by: PHYSICIAN ASSISTANT

## 2024-06-19 PROCEDURE — 80053 COMPREHEN METABOLIC PANEL: CPT | Performed by: PHYSICIAN ASSISTANT

## 2024-06-19 PROCEDURE — 80061 LIPID PANEL: CPT | Performed by: PHYSICIAN ASSISTANT

## 2024-06-19 PROCEDURE — 99385 PREV VISIT NEW AGE 18-39: CPT | Performed by: PHYSICIAN ASSISTANT

## 2024-06-19 PROCEDURE — 82306 VITAMIN D 25 HYDROXY: CPT | Performed by: PHYSICIAN ASSISTANT

## 2024-06-19 PROCEDURE — 84443 ASSAY THYROID STIM HORMONE: CPT | Performed by: PHYSICIAN ASSISTANT

## 2024-06-19 PROCEDURE — 85027 COMPLETE CBC AUTOMATED: CPT | Performed by: PHYSICIAN ASSISTANT

## 2024-06-19 PROCEDURE — 99213 OFFICE O/P EST LOW 20 MIN: CPT | Mod: 25 | Performed by: PHYSICIAN ASSISTANT

## 2024-06-19 RX ORDER — LEVOTHYROXINE SODIUM 125 UG/1
125 TABLET ORAL DAILY
Qty: 90 TABLET | Refills: 3 | Status: SHIPPED | OUTPATIENT
Start: 2024-06-19

## 2024-06-19 SDOH — HEALTH STABILITY: PHYSICAL HEALTH: ON AVERAGE, HOW MANY MINUTES DO YOU ENGAGE IN EXERCISE AT THIS LEVEL?: 30 MIN

## 2024-06-19 SDOH — HEALTH STABILITY: PHYSICAL HEALTH: ON AVERAGE, HOW MANY DAYS PER WEEK DO YOU ENGAGE IN MODERATE TO STRENUOUS EXERCISE (LIKE A BRISK WALK)?: 3 DAYS

## 2024-06-19 ASSESSMENT — SOCIAL DETERMINANTS OF HEALTH (SDOH): HOW OFTEN DO YOU GET TOGETHER WITH FRIENDS OR RELATIVES?: ONCE A WEEK

## 2024-06-19 NOTE — PATIENT INSTRUCTIONS
"Patient Education   Preventive Care Advice   This is general advice we often give to help people stay healthy. Your care team may have specific advice just for you. Please talk to your care team about your own preventive care needs.  Lifestyle  Exercise at least 150 minutes each week (30 minutes a day, 5 days a week).  Do muscle strengthening activities 2 days a week. These help control your weight and prevent disease.  No smoking.  Wear sunscreen to prevent skin cancer.  Have your home tested for radon every 2 to 5 years. Radon is a colorless, odorless gas that can harm your lungs. To learn more, go to www.health.[facility].[place].us and search for \"Radon in Homes.\"  Keep guns unloaded and locked up in a safe place like a safe or gun vault, or, use a gun lock and hide the keys. Always lock away bullets separately. To learn more, visit [employer].gov and search for \"safe gun storage.\"  Nutrition  Eat 5 or more servings of fruits and vegetables each day.  Try wheat bread, brown rice and whole grain pasta (instead of white bread, rice, and pasta).  Get enough calcium and vitamin D. Check the label on foods and aim for 100% of the RDA (recommended daily allowance).  Regular exams  Have a dental exam and cleaning every 6 months.  See your health care team every year to talk about:  Any changes in your health.  Any medicines your care team has prescribed.  Preventive care, family planning, and ways to prevent chronic diseases.  Shots (vaccines)   HPV shots (up to age 26), if you've never had them before.  Hepatitis B shots (up to age 59), if you've never had them before.  COVID-19 shot: Get this shot when it's due.  Flu shot: Get a flu shot every year.  Tetanus shot: Get a tetanus shot every 10 years.  Pneumococcal, hepatitis A, and RSV shots: Ask your care team if you need these based on your risk.  Shingles shot (for age 50 and up).  General health tests  Diabetes screening:  Starting at age 35, Get screened for diabetes at least " every 3 years.  If you are younger than age 35, ask your care team if you should be screened for diabetes.  Cholesterol test: At age 39, start having a cholesterol test every 5 years, or more often if advised.  Bone density scan (DEXA): At age 50, ask your care team if you should have this scan for osteoporosis (brittle bones).  Hepatitis C: Get tested at least once in your life.  Abdominal aortic aneurysm screening: Talk to your doctor about having this screening if you:  Have ever smoked; and  Are biologically male; and  Are between the ages of 65 and 75.  STIs (sexually transmitted infections)  Before age 24: Ask your care team if you should be screened for STIs.  After age 24: Get screened for STIs if you're at risk. You are at risk for STIs (including HIV) if:  You are sexually active with more than one person.  You don't use condoms every time.  You or a partner was diagnosed with a sexually transmitted infection.  If you are at risk for HIV, ask about PrEP medicine to prevent HIV.  Get tested for HIV at least once in your life, whether you are at risk for HIV or not.  Cancer screening tests  Cervical cancer screening: If you have a cervix, begin getting regular cervical cancer screening tests at age 21. Most people who have regular screenings with normal results can stop after age 65. Talk about this with your provider.  Breast cancer scan (mammogram): If you've ever had breasts, begin having regular mammograms starting at age 40. This is a scan to check for breast cancer.  Colon cancer screening: It is important to start screening for colon cancer at age 45.  Have a colonoscopy test every 10 years (or more often if you're at risk) Or, ask your provider about stool tests like a FIT test every year or Cologuard test every 3 years.  To learn more about your testing options, visit: www.[employer]/254141.pdf.  For help making a decision, visit: nicky/pv99480.  Prostate cancer screening test: If you have a  prostate and are age 55 to 69, ask your provider if you would benefit from a yearly prostate cancer screening test.  Lung cancer screening: If you are a current or former smoker age 50 to 80, ask your care team if ongoing lung cancer screenings are right for you.  For informational purposes only. Not to replace the advice of your health care provider. Copyright   2023 [facility]. All rights reserved. Clinically reviewed by the [facility] Transitions Program. [employer] [id number] - REV 04/24.

## 2024-06-19 NOTE — PROGRESS NOTES
Preventive Care Visit  [facility]  Rubi Madison PA-C, Family Medicine  Jun 19, 2024      Assessment & Plan     Routine general medical examination at a health care facility  - REVIEW OF HEALTH MAINTENANCE PROTOCOL ORDERS  - Multiple Vitamin (MULTIVITAMIN ADULT PO)  - PRIMARY CARE FOLLOW-UP SCHEDULING    Hypothyroidism due to Hashimoto's thyroiditis  Euthyroid.  Continue current regimen.  Labs for surveillance.  - levothyroxine (SYNTHROID/LEVOTHROID) 125 MCG tablet  Dispense: 90 tablet; Refill: 3  - TSH WITH FREE T4 REFLEX    Screening for deficiency anemia  Screening for diabetes mellitus  Lipid screening  Encounter for vitamin deficiency screening  Routine screening  - Vitamin D Deficiency  - Lipid panel reflex to direct LDL Fasting  - Comprehensive metabolic panel (BMP + Alb, Alk Phos, ALT, AST, Total. Bili, TP)  - CBC with platelets    Screening for skin cancer  Patient requesting dermatology consultation.  Referral placed today.  - Adult Dermatology  Referral          Patient has been advised of split billing requirements and indicates understanding: Yes        Counseling  Appropriate preventive services were discussed with this patient, including applicable screening as appropriate for fall prevention, nutrition, physical activity, Tobacco-use cessation, weight loss and cognition.  Checklist reviewing preventive services available has been given to the patient.      Return in about 1 year (around 6/19/2025) for Physical Exam, Fasting labs, Medication recheck.      Rubi Madison MBA, MS, PA-C  [facility] [place] is a 33 year old, presenting for the following:  Physical        6/19/2024     6:52 AM   Additional Questions   Roomed by Leigha HARPER   Accompanied by Self        Health Care Directive  Patient does not have a Health Care Directive or Living Will: Discussed advance care planning with patient; however, patient declined at  this time.    HPI    Hypothyroidism Follow-up  Levothyroxine 125 mcg daily  Since last visit, patient describes the following symptoms: Weight stable, no hair loss, no skin changes, no constipation, no loose stools        6/19/2024   General Health   How would you rate your overall physical health? Excellent   Feel stress (tense, anxious, or unable to sleep) Only a little      (!) STRESS CONCERN      6/19/2024   Nutrition   Three or more servings of calcium each day? Yes   Diet: Regular (no restrictions)   How many servings of fruit and vegetables per day? 4 or more   How many sweetened beverages each day? 0-1            6/19/2024   Exercise   Days per week of moderate/strenous exercise 3 days   Average minutes spent exercising at this level 30 min            6/19/2024   Social Factors   Frequency of gathering with friends or relatives Once a week   Worry food won't last until get money to buy more No   Food not last or not have enough money for food? No   Do you have housing?  Yes   Are you worried about losing your housing? No   Lack of transportation? No   Unable to get utilities (heat,electricity)? No            6/19/2024   Dental   Dentist two times every year? Yes            6/19/2024   TB Screening   Were you born outside of the [place]? No            Today's PHQ-2 Score:       6/19/2024     6:50 AM   PHQ-2 ( 1999 [employer])   Q1: Little interest or pleasure in doing things 0   Q2: Feeling down, depressed or hopeless 0   PHQ-2 Score 0   Q1: Little interest or pleasure in doing things Not at all   Q2: Feeling down, depressed or hopeless Not at all   PHQ-2 Score 0           6/19/2024   Substance Use   Alcohol more than 3/day or more than 7/wk No   Do you use any other substances recreationally? No        Social History     Tobacco Use    Smoking status: Never    Smokeless tobacco: Never   Vaping Use    Vaping status: Never Used   Substance Use Topics    Alcohol use: Not Currently    Drug use: No          Mammogram  "Screening - Patient under 40 years of age: Routine Mammogram Screening not recommended.         2024   STI Screening   New sexual partner(s) since last STI/HIV test? No        History of abnormal Pap smear: No - age 30- 64 PAP with HPV every 5 years recommended        Latest Ref Rng & Units 3/23/2023     2:28 PM 2020    10:01 AM   PAP / HPV   PAP  Negative for Intraepithelial Lesion or Malignancy (NILM)     PAP (Historical)   NIL    HPV 16 DNA Negative Negative     HPV 18 DNA Negative Negative     Other HR HPV Negative Negative             2024   Contraception/Family Planning   Questions about contraception or family planning No           Reviewed and updated as needed this visit by Provider   Tobacco  Allergies  Meds  Problems  Med Hx  Surg Hx  Fam Hx  Soc   Hx Sexual Activity          Past Medical History:   Diagnosis Date    Hypothyroid     irregular period, weight gain, family history    PONV (postoperative nausea and vomiting)      Past Surgical History:   Procedure Laterality Date     SECTION N/A 2019    Procedure:  SECTION;  Surgeon: Enrique Delgado MD;  Location: [facility] OR     SECTION N/A 2020    Procedure: REPEAT  SECTION;  Surgeon: Neris Nur DO;  Location: [facility] L+D     SECTION N/A 2023    Procedure: REPEAT  SECTION;  Surgeon: Neris Nur DO;  Location: [facility] L+D    HC TOOTH EXTRACTION W/FORCEP           Review of Systems  Constitutional, HEENT, cardiovascular, pulmonary, GI, , musculoskeletal, neuro, skin, endocrine and psych systems are negative, except as otherwise noted.     Objective    Exam  /61 (BP Location: Left arm, Patient Position: Chair, Cuff Size: Adult Regular)   Pulse 82   Temp 97.9  F (36.6  C) (Tympanic)   Resp 10   Ht 1.56 m (5' 1.42\")   Wt 55.8 kg (123 lb)   LMP 05/15/2024 (Approximate)   SpO2 99%   Breastfeeding No   BMI 22.93 kg/m     Estimated body mass index is " "22.93 kg/m  as calculated from the following:    Height as of this encounter: 1.56 m (5' 1.42\").    Weight as of this encounter: 55.8 kg (123 lb).    Physical Exam  GENERAL: alert and no distress  EYES: Eyes grossly normal to inspection, PERRL and conjunctivae and sclerae normal  HENT: ear canals and TM's normal, nose and mouth without ulcers or lesions  NECK: no adenopathy, no asymmetry, masses, or scars  RESP: lungs clear to auscultation - no rales, rhonchi or wheezes  CV: regular rate and rhythm, normal S1 S2, no S3 or S4, no murmur, click or rub, no peripheral edema  ABDOMEN: soft, nontender, no hepatosplenomegaly, no masses and bowel sounds normal  MS: no gross musculoskeletal defects noted, no edema  SKIN: no suspicious lesions or rashes  NEURO: Normal strength and tone, mentation intact and speech normal  PSYCH: mentation appears normal, affect normal/bright        Signed Electronically by: Rubi Madison PA-C    "

## 2024-06-19 NOTE — RESULT ENCOUNTER NOTE
Yary  I have reviewed your recent test results:    -Normal red blood cell (hgb) levels, normal white blood cell count and normal platelet levels.  -Cholesterol levels (LDL,HDL, Triglycerides) are normal. ADVISE: rechecking in 5 years.  -Liver and gallbladder tests are normal (ALT,AST, Alk phos, bilirubin), kidney function is normal (Cr, GFR), sodium is normal, potassium is normal, calcium is normal, glucose is normal.  -TSH (thyroid stimulating hormone) level is normal which indicates normal thyroid function.  -Vitamin D level is normal and getting 1000 IU daily in your diet or supplements is recommended.     For additional lab test information, [email or web address] is an excellent reference.     If you have any questions please do not hesitate to contact our office via phone ([phone]) or MyChart.    Healthy regards,     Rubi Madison MBA, MS, PA-C  [facility]

## 2024-09-08 ENCOUNTER — NURSE TRIAGE (OUTPATIENT)
Dept: NURSING | Facility: CLINIC | Age: 34
End: 2024-09-08
Payer: COMMERCIAL

## 2024-09-08 NOTE — TELEPHONE ENCOUNTER
"Nurse Triage SBAR    Is this a 2nd Level Triage? NO    Situation:  Morning sickness    Background:  Pt's spouse is on the line, consent on file. He reports pt is about 7 wks pregnant.     Assessment:  States she has been nauseated since Friday. Reports pt has been able to keep sipping on fluids but hasn't wanted to eat much. Reports pt had an emesis or two yesterday but otherwise is just mainly nauseous and dry heaving.     Protocol Recommended Disposition:   Home Care    Recommendation:  Home care. Discussed if the nausea doesn't start to improve pt should reach out to her OB tomorrow to discuss options for treating it if the over the counter options aren't helping. Protocol and care advice reviewed. Advised to call back with any new or worsening signs, symptoms, concerns, or questions. They verbalized understanding and agreed to follow advice given.     Reason for Disposition   MILD-MODERATE vomiting (e.g., 1-5 times / day) or nausea    Additional Information   Negative: [1] Vomiting AND [2] contains red blood or black (\"coffee ground\") material  (Exception: Few red streaks in vomit that only happened once.)   Negative: [1] Insulin-dependent diabetes (Type I) AND [2] glucose > 400 mg/dl (22 mmol/l)   Negative: Recent head injury (within last 3 days)   Negative: Recent abdominal injury (within last 3 days)   Negative: Severe pain in one eye   Negative: [1] SEVERE vomiting (e.g., 6 or more times / day) AND [2] present > 8 hours   Negative: [1] Drinking very little AND [2] dehydration suspected (e.g., no urine > 12 hours, very dry mouth, very lightheaded)   Negative: Patient sounds very sick or weak to the triager   Negative: [1] Unable to keep ANY liquids down (without vomiting) AND [2] present > 24 hours   Negative: Weight loss > 5 pounds (2.5 kg) in last 2 weeks   Negative: Vomiting an essential or prescribed medication  (Exception: Taking prenatal vitamins.)   Negative: Recently started on a new medication   " Negative: [1] MODERATE vomiting (e.g., 3 - 5 times / day) AND [2] present > 3 days   Negative: Pain or burning with passing urine (urination)   Negative: High-risk adult (e.g., diabetes mellitus, AIDS/HIV)   Negative: Substance use (drug use) or unhealthy alcohol use, known or suspected   Negative: [1] MILD vomiting (e.g., 1 - 2 times / day) AND [2] present > 3 days AND [3] started after the 9th week of pregnancy   Negative: [1] MILD vomiting AND [2] present > 1 week AND [3] no improvement after using Morning Sickness Care Advice    Protocols used: Pregnancy - Morning Sickness (Nausea and Vomiting of Pregnancy)-A-AH

## 2024-09-11 ENCOUNTER — HOSPITAL ENCOUNTER (EMERGENCY)
Facility: CLINIC | Age: 34
Discharge: LEFT BEFORE DISCHARGE | End: 2024-09-12
Admitting: EMERGENCY MEDICINE
Payer: COMMERCIAL

## 2024-09-11 VITALS
SYSTOLIC BLOOD PRESSURE: 118 MMHG | HEIGHT: 62 IN | RESPIRATION RATE: 20 BRPM | DIASTOLIC BLOOD PRESSURE: 87 MMHG | HEART RATE: 121 BPM | TEMPERATURE: 100.1 F | WEIGHT: 122.36 LBS | BODY MASS INDEX: 22.52 KG/M2 | OXYGEN SATURATION: 98 %

## 2024-09-11 PROCEDURE — 99281 EMR DPT VST MAYX REQ PHY/QHP: CPT

## 2024-09-11 ASSESSMENT — COLUMBIA-SUICIDE SEVERITY RATING SCALE - C-SSRS
2. HAVE YOU ACTUALLY HAD ANY THOUGHTS OF KILLING YOURSELF IN THE PAST MONTH?: NO
1. IN THE PAST MONTH, HAVE YOU WISHED YOU WERE DEAD OR WISHED YOU COULD GO TO SLEEP AND NOT WAKE UP?: NO
6. HAVE YOU EVER DONE ANYTHING, STARTED TO DO ANYTHING, OR PREPARED TO DO ANYTHING TO END YOUR LIFE?: NO

## 2024-09-19 ENCOUNTER — VIRTUAL VISIT (OUTPATIENT)
Dept: OBGYN | Facility: CLINIC | Age: 34
End: 2024-09-19
Payer: COMMERCIAL

## 2024-09-19 DIAGNOSIS — Z34.90 SUPERVISION OF NORMAL PREGNANCY: Primary | ICD-10-CM

## 2024-09-19 DIAGNOSIS — E03.9 HYPOTHYROIDISM, UNSPECIFIED TYPE: ICD-10-CM

## 2024-09-19 PROCEDURE — 99207 PR NO CHARGE NURSE ONLY: CPT | Mod: 93

## 2024-09-19 RX ORDER — VITAMIN A, VITAMIN C, VITAMIN D-3, VITAMIN E, VITAMIN B-1, VITAMIN B-2, NIACIN, VITAMIN B-6, CALCIUM, IRON, ZINC, COPPER 4000; 120; 400; 22; 1.84; 3; 20; 10; 1; 12; 200; 27; 25; 2 [IU]/1; MG/1; [IU]/1; MG/1; MG/1; MG/1; MG/1; MG/1; MG/1; UG/1; MG/1; MG/1; MG/1; MG/1
TABLET ORAL DAILY
COMMUNITY

## 2024-09-19 NOTE — NURSING NOTE
NPN nurse visit done over the phone. Pt will be given NPN folder and book at her upcoming appt.  Discussed optional screening available to assess chromosomal anomalies. Questions answered. Informed pt of the clinic structure (on-call does deliveries for the day, may be male or female doctor). Pt advised to call the clinic if she has any questions or concerns related to her pregnancy. Prenatal labs will be obtained at her upcoming appt. New prenatal visit scheduled on 10/10 with Dr. Covington.    8w3d    Menstrual cycles: regular  Date of positive pregnancy test: approx 8/22  Medications stopped upon pos HPT: none    Last pap: 3/2023        Patient supplied answers from flow sheet for:  Prenatal OB Questionnaire.  Past Medical History  Have you ever recieved care for your mental health? : No  Have you ever been in a major accident or suffered serious trauma?: (!) Yes  Within the last year, has anyone hit, slapped, kicked or otherwise hurt you?: No  In the last year, has anyone forced you to have sex when you didn't want to?: No    Past Medical History 2   Have you ever received a blood transfusion?: No  Would you accept a blood transfusion if was medically recommended?: Yes  Does anyone in your home smoke?: No   Is your blood type Rh negative?: No  Have you ever ?: (!) Yes  Have you been hospitalized for a nonsurgical reason excluding normal delivery?: (!) Yes (age 15)  Have you ever had an abnormal pap smear?: No    Past Medical History (Continued)  Do you have a history of abnormalities of the uterus?: No  Did your mother take ANDIE or any other hormones when she was pregnant with you?: No  Do you have any other problems we have not asked about which you feel may be important to this pregnancy?: No    [facility]

## 2024-09-25 LAB
ABO/RH(D): NORMAL
ANTIBODY SCREEN: NEGATIVE
SPECIMEN EXPIRATION DATE: NORMAL

## 2024-09-26 ENCOUNTER — ANCILLARY PROCEDURE (OUTPATIENT)
Dept: ULTRASOUND IMAGING | Facility: CLINIC | Age: 34
End: 2024-09-26
Attending: STUDENT IN AN ORGANIZED HEALTH CARE EDUCATION/TRAINING PROGRAM
Payer: COMMERCIAL

## 2024-09-26 ENCOUNTER — LAB (OUTPATIENT)
Dept: LAB | Facility: CLINIC | Age: 34
End: 2024-09-26
Payer: COMMERCIAL

## 2024-09-26 DIAGNOSIS — Z34.90 SUPERVISION OF NORMAL PREGNANCY: ICD-10-CM

## 2024-09-26 LAB
ERYTHROCYTE [DISTWIDTH] IN BLOOD BY AUTOMATED COUNT: 12.4 % (ref 10–15)
HBV SURFACE AG SERPL QL IA: NONREACTIVE
HCT VFR BLD AUTO: 36.6 % (ref 35–47)
HCV AB SERPL QL IA: NONREACTIVE
HGB BLD-MCNC: 12.9 G/DL (ref 11.7–15.7)
HIV 1+2 AB+HIV1 P24 AG SERPL QL IA: NONREACTIVE
MCH RBC QN AUTO: 29.7 PG (ref 26.5–33)
MCHC RBC AUTO-ENTMCNC: 35.2 G/DL (ref 31.5–36.5)
MCV RBC AUTO: 84 FL (ref 78–100)
PLATELET # BLD AUTO: 255 10E3/UL (ref 150–450)
RBC # BLD AUTO: 4.35 10E6/UL (ref 3.8–5.2)
RUBV IGG SERPL QL IA: 9.39 INDEX
RUBV IGG SERPL QL IA: POSITIVE
T PALLIDUM AB SER QL: NONREACTIVE
WBC # BLD AUTO: 7.4 10E3/UL (ref 4–11)

## 2024-09-26 PROCEDURE — 85027 COMPLETE CBC AUTOMATED: CPT

## 2024-09-26 PROCEDURE — 87086 URINE CULTURE/COLONY COUNT: CPT

## 2024-09-26 PROCEDURE — 86850 RBC ANTIBODY SCREEN: CPT

## 2024-09-26 PROCEDURE — 86900 BLOOD TYPING SEROLOGIC ABO: CPT

## 2024-09-26 PROCEDURE — 87389 HIV-1 AG W/HIV-1&-2 AB AG IA: CPT

## 2024-09-26 PROCEDURE — 86780 TREPONEMA PALLIDUM: CPT

## 2024-09-26 PROCEDURE — 86803 HEPATITIS C AB TEST: CPT

## 2024-09-26 PROCEDURE — 36415 COLL VENOUS BLD VENIPUNCTURE: CPT

## 2024-09-26 PROCEDURE — 86762 RUBELLA ANTIBODY: CPT

## 2024-09-26 PROCEDURE — 76801 OB US < 14 WKS SINGLE FETUS: CPT | Performed by: OBSTETRICS & GYNECOLOGY

## 2024-09-26 PROCEDURE — 87340 HEPATITIS B SURFACE AG IA: CPT

## 2024-09-26 PROCEDURE — 86901 BLOOD TYPING SEROLOGIC RH(D): CPT

## 2024-09-27 LAB — BACTERIA UR CULT: NORMAL

## 2024-09-30 NOTE — ADDENDUM NOTE
Addended by: HALINA PALAFOX on: 10/3/2020 09:00 PM     Modules accepted: Orders     Called Patient. Spoke with her . Name and  confirmed.  He stated that she received prescription from her surgeon.     Statement Selected

## 2024-10-10 ENCOUNTER — PRENATAL OFFICE VISIT (OUTPATIENT)
Dept: OBGYN | Facility: CLINIC | Age: 34
End: 2024-10-10
Payer: COMMERCIAL

## 2024-10-10 VITALS — BODY MASS INDEX: 22.24 KG/M2 | DIASTOLIC BLOOD PRESSURE: 76 MMHG | WEIGHT: 121.6 LBS | SYSTOLIC BLOOD PRESSURE: 112 MMHG

## 2024-10-10 DIAGNOSIS — E03.9 HYPOTHYROIDISM, UNSPECIFIED TYPE: ICD-10-CM

## 2024-10-10 DIAGNOSIS — K59.00 CONSTIPATION DURING PREGNANCY IN FIRST TRIMESTER: ICD-10-CM

## 2024-10-10 DIAGNOSIS — Z23 NEED FOR PROPHYLACTIC VACCINATION AND INOCULATION AGAINST INFLUENZA: ICD-10-CM

## 2024-10-10 DIAGNOSIS — Z34.81 ENCOUNTER FOR SUPERVISION OF OTHER NORMAL PREGNANCY IN FIRST TRIMESTER: ICD-10-CM

## 2024-10-10 DIAGNOSIS — O99.611 CONSTIPATION DURING PREGNANCY IN FIRST TRIMESTER: ICD-10-CM

## 2024-10-10 DIAGNOSIS — Z34.80 SUPERVISION OF OTHER NORMAL PREGNANCY, ANTEPARTUM: Primary | ICD-10-CM

## 2024-10-10 DIAGNOSIS — Z11.3 SCREEN FOR STD (SEXUALLY TRANSMITTED DISEASE): ICD-10-CM

## 2024-10-10 PROCEDURE — 36415 COLL VENOUS BLD VENIPUNCTURE: CPT | Performed by: STUDENT IN AN ORGANIZED HEALTH CARE EDUCATION/TRAINING PROGRAM

## 2024-10-10 PROCEDURE — 87591 N.GONORRHOEAE DNA AMP PROB: CPT | Performed by: STUDENT IN AN ORGANIZED HEALTH CARE EDUCATION/TRAINING PROGRAM

## 2024-10-10 PROCEDURE — 90471 IMMUNIZATION ADMIN: CPT | Performed by: STUDENT IN AN ORGANIZED HEALTH CARE EDUCATION/TRAINING PROGRAM

## 2024-10-10 PROCEDURE — 84443 ASSAY THYROID STIM HORMONE: CPT | Performed by: STUDENT IN AN ORGANIZED HEALTH CARE EDUCATION/TRAINING PROGRAM

## 2024-10-10 PROCEDURE — 99214 OFFICE O/P EST MOD 30 MIN: CPT | Mod: 25 | Performed by: STUDENT IN AN ORGANIZED HEALTH CARE EDUCATION/TRAINING PROGRAM

## 2024-10-10 PROCEDURE — 90656 IIV3 VACC NO PRSV 0.5 ML IM: CPT | Performed by: STUDENT IN AN ORGANIZED HEALTH CARE EDUCATION/TRAINING PROGRAM

## 2024-10-10 PROCEDURE — 87491 CHLMYD TRACH DNA AMP PROBE: CPT | Performed by: STUDENT IN AN ORGANIZED HEALTH CARE EDUCATION/TRAINING PROGRAM

## 2024-10-10 RX ORDER — METOCLOPRAMIDE 10 MG/1
10 TABLET ORAL
COMMUNITY
Start: 2024-09-12

## 2024-10-10 RX ORDER — SENNA AND DOCUSATE SODIUM 50; 8.6 MG/1; MG/1
1 TABLET, FILM COATED ORAL 2 TIMES DAILY PRN
Qty: 60 TABLET | Refills: 2 | Status: SHIPPED | OUTPATIENT
Start: 2024-10-10

## 2024-10-10 NOTE — NURSING NOTE
"Chief Complaint   Patient presents with    Prenatal Care     New Prenatal visit   11 weeks 3 days  Labs & U/S done   Pap 3/23/23 NIL   G/C due         Initial /76 (BP Location: Right arm, Cuff Size: Adult Regular)   Wt 55.2 kg (121 lb 9.6 oz)   LMP 2024 (Exact Date)   BMI 22.24 kg/m   Estimated body mass index is 22.24 kg/m  as calculated from the following:    Height as of 24: 1.575 m (5' 2\").    Weight as of this encounter: 55.2 kg (121 lb 9.6 oz).  BP completed using cuff size: regular    Questioned patient about current smoking habits.  Pt. has never smoked.    11w3d       The following HM Due: NONE        +Nausea   Thyroid labs   G/C due         Obdulia Richter, CMA on 10/10/2024 at 1:37 PM     "

## 2024-10-10 NOTE — PROGRESS NOTES
[facility]  Initial Prenatal    HPI:  Yary Romero, is a 33 year old  at 11w3d by LMP c/w 9w3d US, with Estimated Date of Delivery: 2025. She is here to establish prenatal care. She is here alone.    - This pregnancy is planned. This is good news.  - Patient's last menstrual period was 2024 (exact date).   - Menstrual cycles are: regular.  - Since her last menstrual period she has had no vaginal bleeding.  - FOB: Woody. New FOB: No  - Medications taken since last menstrual period include: PNV, Reglan  - She is experiencing the following symptoms of pregnancy:   - Nausea: Yes  - Vomiting: No  - Other symptoms:   - Constipation: Yes - not responsive to Miralax and Dulcolax  - GERD: No    Factors pertinent to this pregnancy:  - Hx CS x3  - Hypothyroidism    OB Hx:  OB History    Para Term  AB Living   4 3 3 0 0 3   SAB IAB Ectopic Multiple Live Births   0 0 0 0 3      # Outcome Date GA Lbr Edilson/2nd Weight Sex Type Anes PTL Lv   4 Current            3 Term 23 39w4d  3.86 kg (8 lb 8.2 oz) M CS-LTranv   GRACE      Birth Comments: Persistent right umbilical veinunsure if there is 3 and possible4 vessels visable      Name: YAMILE ROMERO      Apgar1: 9  Apgar5: 10   2 Term 20 39w3d  3.76 kg (8 lb 4.6 oz) M CS-LTranv Spinal  GRACE      Name: YAMILE ROMERO      Apgar1: 9  Apgar5: 9   1 Term 19 41w6d 10:00 / 06:59 3.742 kg (8 lb 4 oz) M CS-LTranv EPI N GRACE      Complications: Failure to Progress in Second Stage      Name: Chivo      Apgar1: 6  Apgar5: 9       History of:   - PTL: No  - PTD: No   - PreE or other hypertensive disorder: No  - GDM: No  - PPH: No   - SD: No   - 3rd or 4th degree laceration: No  - Prior CS: Yes    Gyn Hx:  - STI history: No  - HSV history - Self: No  - HSV history - Partner: No  - Last pap:  - NILM HPV neg  - Hx uterine surgery: Yes - CS x3    Past Medical Hx:     Past Medical History:   Diagnosis Date     Hypothyroid     irregular period, weight gain, family history    Pelvic fracture (H)     age 15    PONV (postoperative nausea and vomiting)     Urinary tract infection     Varicella        Problem List:    Patient Active Problem List   Diagnosis    Hypothyroidism due to Hashimoto's thyroiditis    S/P  section       Past Surgical Hx:   Past Surgical History:   Procedure Laterality Date     SECTION N/A 2019    Procedure:  SECTION;  Surgeon: Enrique Delgado MD;  Location: [facility] OR     SECTION N/A 2020    Procedure: REPEAT  SECTION;  Surgeon: Neris Nur DO;  Location: [facility] L+D     SECTION N/A 2023    Procedure: REPEAT  SECTION;  Surgeon: Neris Nur DO;  Location: [facility] L+D    HC TOOTH EXTRACTION W/FORCEP         Medications:   Current Outpatient Medications   Medication Sig Dispense Refill    levothyroxine (SYNTHROID/LEVOTHROID) 125 MCG tablet Take 1 tablet (125 mcg) by mouth daily 90 tablet 3    metoclopramide (REGLAN) 10 MG tablet Take 10 mg by mouth.      ondansetron (ZOFRAN) 8 MG tablet Take 1 tablet (8 mg) by mouth every 8 hours as needed for nausea. 30 tablet 3    Prenatal Vit-Fe Fumarate-FA (PRENATAL MULTIVITAMIN  PLUS IRON) 27-1 MG TABS Take by mouth daily.      SENNA-docusate sodium (SENNA S) 8.6-50 MG tablet Take 1 tablet by mouth 2 times daily as needed (Constipaton). 60 tablet 2     No current facility-administered medications for this visit.       Allergies:     Allergies   Allergen Reactions    No Known Drug Allergy        Social Hx:   Marital status:   FOB involved: Yes  Household members include: , kids  Feels safe at home/with partner: Yes  Pets: None  Occupation: Stay at home mom  Financial concerns: None  Transportation concerns: None  Tobacco: Denies  Alcohol: Denies  Drugs: Denies    Family Hx:   Family History   Problem Relation Age of Onset    Thyroid Disease Mother         hypothyroid     "Thyroid Disease Father         hypothyroid    Prostate Cancer Father     No Known Problems Brother     Hypothyroidism Brother     Diabetes Maternal Grandmother 60        type 2    Cerebrovascular Disease Maternal Grandmother 70    Hypertension Maternal Grandmother     Thyroid Disease Maternal Grandmother     Lipids Maternal Grandmother     Other Cancer Maternal Grandfather         lung    Diabetes Maternal Grandfather 60        type 2    Cerebrovascular Disease Maternal Grandfather 50    Hypertension Maternal Grandfather     Lipids Maternal Grandfather     Cancer Maternal Grandfather 69        had wagoners disease and  from lung cancer    Thyroid Disease Paternal Grandmother     Melanoma Paternal Grandfather 51         from skin cancer    Breast Cancer Maternal Aunt 39        early 40\"s    Breast Cancer Other         great aunt     Bleeding/clotting: Denies  Birth defects: Denies  Intellectual disability: Denies  Genetic diseases: Denies    PreE Risk Factor Assessment  If 1 HIGH risk factor or 2 MODERATE risk factors, start low dose ASA between 12 and 28 weeks (ideally before 16 weeks) and continue until delivery.    HIGH risk factors:   - Hx PreE, multifetal gestation  - CHTN  - Pregestational T1DM or T2DM  - Kidney disease  - Autoimmune disease    MODERATE risk factors:   - Nulliparity  - Obesity (BMI >30)  - Family history of PreE (mother, sister)  - Socioeconomic characteristics (lower income, black race [proxy for racism])  - Age 35 or older  - Personal history factors (low birth weight or SGA, previous adverse pregnancy outcome, >10-year pregnancy interval)    Qualify for ASA: NO    ROS:  Negative except as per HPI    Physical Examination:  /76 (BP Location: Right arm, Cuff Size: Adult Regular)   Wt 55.2 kg (121 lb 9.6 oz)   LMP 2024 (Exact Date)   BMI 22.24 kg/m    Body mass index is 22.24 kg/m .    GEN: Alert, oriented x3, NAD  HEENT: Atraumatic/normocephalic, pupils mid-sized, " MMM  NECK: Normal ROM, no thyromegaly or masses  CV: Well perfused  PULM: Normal work of breathing  ABD: Soft, non-distended, and non-tender without guarding or rebound  : Normal external female genitalia. Normal vagina and cervix. Physiologic vaginal discharge. Exam chaperoned by Obdulia Richter MA.  EXT: Normal extremities, grossly normal ROM  SKIN: Warm and dry, skin color normal  NEURO: Speech clear, CNs grossly intact, moves all extremities appropriately  PSYCH: Appropriate affect    FH: CWD  FHT: Present    Labs:  See below    Imaging:  See imaging tab    A/P:  Yary Muñoz is a 33 year old  at by LMP c/w 9w3d US, with Estimated Date of Delivery: 2025.    #Routine OB  - Discussed routine prenatal care and timing of visits. Education provided about health pregnancy habits.  - Discussed pregnancy weight gain goals  - Rh pos // Ab neg  - Infectious: RI // Hep B S Ag neg // RPR neg // HIV neg // Hep C SAb neg // GC/CT today // VZV immune // UA/UC neg  - Genetics: NIPT, Anatomy US  - Preeclampsia risk: not elevated due to  - IZ: Flu [today]  COVID [x]  Booster [dec] TDAP []  - Pap: Up to date  - Feed: Not discussed  - BC: Not discussed    #Hx of CS x3 - Plans repeat.  #Hypothyroidism - TSH. Current dose of synthroid 125 mcg.  #Constipation - Senna-S Rx    Return in 4 weeks for routine prenatal visit.    Bobo Covington MD MPH  [facility]  10/10/2024 2:04 PM

## 2024-10-11 LAB
C TRACH DNA SPEC QL NAA+PROBE: NEGATIVE
N GONORRHOEA DNA SPEC QL NAA+PROBE: NEGATIVE
TSH SERPL DL<=0.005 MIU/L-ACNC: 3.47 UIU/ML (ref 0.3–4.2)

## 2024-10-21 LAB — SCANNED LAB RESULT: NORMAL

## 2024-11-04 ENCOUNTER — TELEPHONE (OUTPATIENT)
Dept: OBGYN | Facility: CLINIC | Age: 34
End: 2024-11-04

## 2024-11-04 ENCOUNTER — PRENATAL OFFICE VISIT (OUTPATIENT)
Dept: OBGYN | Facility: CLINIC | Age: 34
End: 2024-11-04
Payer: COMMERCIAL

## 2024-11-04 VITALS — SYSTOLIC BLOOD PRESSURE: 98 MMHG | DIASTOLIC BLOOD PRESSURE: 60 MMHG | BODY MASS INDEX: 22.63 KG/M2 | WEIGHT: 123.7 LBS

## 2024-11-04 DIAGNOSIS — Z98.891 HISTORY OF CESAREAN SECTION: ICD-10-CM

## 2024-11-04 DIAGNOSIS — Z34.80 SUPERVISION OF OTHER NORMAL PREGNANCY, ANTEPARTUM: Primary | ICD-10-CM

## 2024-11-04 DIAGNOSIS — E03.9 HYPOTHYROIDISM, UNSPECIFIED TYPE: ICD-10-CM

## 2024-11-04 PROCEDURE — 99207 PR PRENATAL VISIT: CPT | Performed by: FAMILY MEDICINE

## 2024-11-04 RX ORDER — LEVOTHYROXINE SODIUM 150 UG/1
150 TABLET ORAL DAILY
Qty: 90 TABLET | Refills: 3 | Status: SHIPPED | OUTPATIENT
Start: 2024-11-04

## 2024-11-04 NOTE — NURSING NOTE
"15w0d    Chief Complaint   Patient presents with    Prenatal Care       Initial BP 98/60   Wt 56.1 kg (123 lb 11.2 oz)   LMP 2024 (Exact Date)   BMI 22.63 kg/m   Estimated body mass index is 22.63 kg/m  as calculated from the following:    Height as of 24: 1.575 m (5' 2\").    Weight as of this encounter: 56.1 kg (123 lb 11.2 oz).  BP completed using cuff size: regular    Questioned patient about current smoking habits.  Pt. has never smoked.          The following HM Due: NONE  "

## 2024-11-04 NOTE — PATIENT INSTRUCTIONS
"Return 4 weeks   Return to clinic:  every 4 weeks till 28 weeks, then every 2 weeks till 36 weeks, then weekly till delivery  Please schedule out a few ob visits at a time so that you can get an appointment as you would like.     For tour call [phone], tours on Thursdays @ 4:30 and 5:30 pm    Phone numbers [place]:  Day/ night [phone] ask for ob triage  Emergency:  Call labor and delivery:  [phone]    What should I call about??    Contraction every 5 minutes for 1 hour 1 minute long (511), bleeding, loss of fluid, headache that doesn't resolve with tylenol, and decreased fetal movement     Start kick counts @ 26-28 weeks   There is an carlito for this!  It is called \"count the kicks\"  Keep track of movement and discover your normal baby movement pattern   guideline is listed below  Please call if you do not feel the baby move!  We will have you come in for fetal heart rate monitoring:   Perception of at least 10 FMs during 12 hours of normal maternal activity   Perception of least 10 FMs over two hours when the mother is at rest and focused on [facility] Address   [address], [place] [address] [phone]    Dr. Nreis Nur, DO    OB/GYN   [facility]                                                    "

## 2024-11-04 NOTE — PROGRESS NOTES
CC: Here for routine prenatal visit   33 year old y/o  @ 15w0d with Estimated Date of Delivery: 2025     BP 98/60   Wt 56.1 kg (123 lb 11.2 oz)   LMP 2024 (Exact Date)   BMI 22.63 kg/m    See OB flowsheet  + fetal movement, no contractions, no bleeding, no loss of fluid   Discussed monitoring fetal movement     1) concerns: doing well   2) Routine: Blood type A+ RI tdap [ ] flu [ ] GS [nl, 4th BOY] NIPT [nl] AFP [declines]   Covid v [v x 2] gtt [ ]  3) Risk factors:   A: CS x 3: RCS on or after   B: hypothyroid: recheck per TM, TSH 3.4- inc to 150 mcg --recheck next visit     4) Return: 4 weeks, us ordered     Liudmila

## 2024-11-04 NOTE — TELEPHONE ENCOUNTER
Patient Name: Yary Muñoz   MRN: [id number]   Case#: [id number]   Surgeons and Role:      * Neris Nur, DO - Primary   Date requested: * No dates entered *   Location: [facility] L+D   Procedure(s):    SECTION

## 2024-11-06 ENCOUNTER — MYC REFILL (OUTPATIENT)
Dept: OBGYN | Facility: CLINIC | Age: 34
End: 2024-11-06
Payer: COMMERCIAL

## 2024-11-06 DIAGNOSIS — O21.9 VOMITING OR NAUSEA OF PREGNANCY: ICD-10-CM

## 2024-11-06 RX ORDER — ONDANSETRON 8 MG/1
8 TABLET, FILM COATED ORAL EVERY 8 HOURS PRN
Qty: 30 TABLET | Refills: 3 | Status: SHIPPED | OUTPATIENT
Start: 2024-11-06

## 2024-11-06 NOTE — TELEPHONE ENCOUNTER
Requested Prescriptions   Pending Prescriptions Disp Refills    ondansetron (ZOFRAN) 8 MG tablet 30 tablet 3     Sig: Take 1 tablet (8 mg) by mouth every 8 hours as needed for nausea.        Antivertigo/Antiemetic Agents Passed - 11/6/2024  1:54 PM        Passed - Medication is active on med list        Passed - Medication indicated for associated diagnosis     The medication is prescribed for one or more of the following conditions:     Motion sickness   Nausea   Vomiting   Vertigo   Chemotherapy-induced nausea and vomiting   Radiation-induced nausea and vomiting,    Nausea and vomiting prophylaxis, migraine          Passed - Recent (12 mo) or future (90 days) visit with authorizing provider's specialty     The patient must have completed an in-person or virtual visit within the past 12 months or has a future visit scheduled within the next 90 days with the authorizing provider s specialty.  Urgent care and e-visits do not quality as an office visit for this protocol.          Passed - Patient is 18 years of age or older           Approved per protocol.     ENDY Cartagena

## 2024-11-06 NOTE — TELEPHONE ENCOUNTER
Type of surgery:  section  Location of surgery: [facility]  Date and time of surgery: 25 @ 7:30 am  Surgeon: Dr. Nur  Pre-Op Appt Date: @ prenatal appointment  Post-Op Appt Date:    Packet sent out: Yes  Pre-cert/Authorization completed:  No  Date: 24

## 2024-12-04 ENCOUNTER — ANCILLARY PROCEDURE (OUTPATIENT)
Dept: ULTRASOUND IMAGING | Facility: CLINIC | Age: 34
End: 2024-12-04
Attending: FAMILY MEDICINE
Payer: COMMERCIAL

## 2024-12-04 DIAGNOSIS — Z34.80 SUPERVISION OF OTHER NORMAL PREGNANCY, ANTEPARTUM: ICD-10-CM

## 2024-12-04 PROCEDURE — 76805 OB US >/= 14 WKS SNGL FETUS: CPT | Performed by: OBSTETRICS & GYNECOLOGY

## 2024-12-05 ENCOUNTER — PRENATAL OFFICE VISIT (OUTPATIENT)
Dept: OBGYN | Facility: CLINIC | Age: 34
End: 2024-12-05
Payer: COMMERCIAL

## 2024-12-05 VITALS
WEIGHT: 124 LBS | DIASTOLIC BLOOD PRESSURE: 58 MMHG | SYSTOLIC BLOOD PRESSURE: 92 MMHG | HEIGHT: 61 IN | BODY MASS INDEX: 23.41 KG/M2

## 2024-12-05 DIAGNOSIS — E03.9 HYPOTHYROIDISM, UNSPECIFIED TYPE: Primary | ICD-10-CM

## 2024-12-05 NOTE — NURSING NOTE
"19w3d    Chief Complaint   Patient presents with    Prenatal Care       Initial BP 92/58   Ht 1.549 m (5' 1\")   Wt 56.2 kg (124 lb)   LMP 2024 (Exact Date)   BMI 23.43 kg/m   Estimated body mass index is 23.43 kg/m  as calculated from the following:    Height as of this encounter: 1.549 m (5' 1\").    Weight as of this encounter: 56.2 kg (124 lb).  BP completed using cuff size: regular    Questioned patient about current smoking habits.  Pt. has never smoked.          The following HM Due: NONE  "

## 2024-12-05 NOTE — PROGRESS NOTES
"CC: Here for routine prenatal visit   33 year old y/o  @ 19w3d with Estimated Date of Delivery: 2025     BP 92/58   Ht 1.549 m (5' 1\")   Wt 56.2 kg (124 lb)   LMP 2024 (Exact Date)   BMI 23.43 kg/m    See OB flowsheet  + fetal movement, no contractions, no bleeding, no loss of fluid   Discussed monitoring fetal movement     1) concerns: nl  2) Routine: Blood type A+ RI tdap [ ] flu [ ] GS [nl, 4th BOY] NIPT [nl] AFP [declines]   Covid v [v x 2] gtt [ ]  Ppbc   3) Risk factors:   A: CS x 3: RCS on or after   B: hypothyroid: recheck per TM, TSH 3.4- inc to 150 mcg --recheck today   TSH   1st trimester:  0.1-2.5 mU/L   2nd trimester:  0.2-3 mU/L  3rd trimester:  0.3-3 mU/L    4) Return: 4 weeks     Liudmila     "

## 2024-12-05 NOTE — PATIENT INSTRUCTIONS
"Return 4 weeks   Return to clinic:  every 4 weeks till 28 weeks, then every 2 weeks till 36 weeks, then weekly till delivery  Please schedule out a few ob visits at a time so that you can get an appointment as you would like.     For tour call [phone], tours on Thursdays @ 4:30 and 5:30 pm    Phone numbers Iris:  Day/ night [phone] ask for ob triage  Emergency:  Call labor and delivery:  [phone]    What should I call about??    Contraction every 5 minutes for 1 hour 1 minute long (511), bleeding, loss of fluid, headache that doesn't resolve with tylenol, and decreased fetal movement     Start kick counts @ 26-28 weeks   There is an carlito for this!  It is called \"count the kicks\"  Keep track of movement and discover your normal baby movement pattern   guideline is listed below  Please call if you do not feel the baby move!  We will have you come in for fetal heart rate monitoring:   Perception of at least 10 FMs during 12 hours of normal maternal activity   Perception of least 10 FMs over two hours when the mother is at rest and focused on [facility] Address   [address], [place] [address] [phone]    Dr. Neris Nur, DO    OB/GYN   [facility] and [facility]                                                    "

## 2024-12-30 ENCOUNTER — PRENATAL OFFICE VISIT (OUTPATIENT)
Dept: OBGYN | Facility: CLINIC | Age: 34
End: 2024-12-30
Payer: COMMERCIAL

## 2024-12-30 VITALS
WEIGHT: 127.2 LBS | BODY MASS INDEX: 24.02 KG/M2 | HEIGHT: 61 IN | DIASTOLIC BLOOD PRESSURE: 58 MMHG | SYSTOLIC BLOOD PRESSURE: 100 MMHG

## 2024-12-30 DIAGNOSIS — E03.9 HYPOTHYROIDISM, UNSPECIFIED TYPE: Primary | ICD-10-CM

## 2024-12-30 LAB — TSH SERPL DL<=0.005 MIU/L-ACNC: 0.72 UIU/ML (ref 0.3–4.2)

## 2024-12-30 PROCEDURE — 99207 PR PRENATAL VISIT: CPT | Performed by: FAMILY MEDICINE

## 2024-12-30 PROCEDURE — 84443 ASSAY THYROID STIM HORMONE: CPT | Performed by: FAMILY MEDICINE

## 2024-12-30 PROCEDURE — 36415 COLL VENOUS BLD VENIPUNCTURE: CPT | Performed by: FAMILY MEDICINE

## 2024-12-30 NOTE — PATIENT INSTRUCTIONS
"Return 4 weeks   Return to clinic:  every 4 weeks till 28 weeks, then every 2 weeks till 36 weeks, then weekly till delivery  Please schedule out a few ob visits at a time so that you can get an appointment as you would like.     For tour call [phone], tours on Thursdays @ 4:30 and 5:30 pm    Phone numbers Iris:  Day/ night [phone] ask for ob triage  Emergency:  Call labor and delivery:  [phone]    What should I call about??    Contraction every 5 minutes for 1 hour 1 minute long (511), bleeding, loss of fluid, headache that doesn't resolve with tylenol, and decreased fetal movement     Start kick counts @ 26-28 weeks   There is an carlito for this!  It is called \"count the kicks\"  Keep track of movement and discover your normal baby movement pattern   guideline is listed below  Please call if you do not feel the baby move!  We will have you come in for fetal heart rate monitoring:   Perception of at least 10 FMs during 12 hours of normal maternal activity   Perception of least 10 FMs over two hours when the mother is at rest and focused on [facility] Address   [address], [place] [address] [phone]    Dr. Neris Nur, DO    OB/GYN   [facility] and [facility]                                                    "

## 2024-12-30 NOTE — NURSING NOTE
"23w0d    Chief Complaint   Patient presents with    Prenatal Care     initial /58   Ht 1.549 m (5' 1\")   Wt 57.7 kg (127 lb 3.2 oz)   LMP 07/22/2024 (Exact Date)   BMI 24.03 kg/m   Estimated body mass index is 24.03 kg/m  as calculated from the following:    Height as of this encounter: 1.549 m (5' 1\").    Weight as of this encounter: 57.7 kg (127 lb 3.2 oz).  BP completed using cuff size alisia Kapoor CMA on 12/30/2024 at 1:49 PM    "

## 2025-02-13 ENCOUNTER — PRENATAL OFFICE VISIT (OUTPATIENT)
Dept: OBGYN | Facility: CLINIC | Age: 35
End: 2025-02-13
Payer: COMMERCIAL

## 2025-02-13 VITALS — SYSTOLIC BLOOD PRESSURE: 106 MMHG | WEIGHT: 133 LBS | DIASTOLIC BLOOD PRESSURE: 60 MMHG | BODY MASS INDEX: 25.13 KG/M2

## 2025-02-13 DIAGNOSIS — E06.3 HYPOTHYROIDISM DUE TO HASHIMOTO'S THYROIDITIS: Primary | ICD-10-CM

## 2025-02-13 DIAGNOSIS — Z34.80 SUPERVISION OF OTHER NORMAL PREGNANCY, ANTEPARTUM: ICD-10-CM

## 2025-02-13 NOTE — NURSING NOTE
"Chief Complaint   Patient presents with    Prenatal Care   29w3d    initial /60   Wt 60.3 kg (133 lb)   LMP 07/22/2024 (Exact Date)   BMI 25.13 kg/m   Estimated body mass index is 25.13 kg/m  as calculated from the following:    Height as of 12/30/24: 1.549 m (5' 1\").    Weight as of this encounter: 60.3 kg (133 lb).  BP completed using cuff size alisia Braden CMA on 2/13/2025 at 12:58 PM    "

## 2025-02-13 NOTE — PROGRESS NOTES
CC: Here for routine prenatal visit   34 year old y/o  @ 29w3d with Estimated Date of Delivery: 2025     /60   Wt 60.3 kg (133 lb)   LMP 2024 (Exact Date)   BMI 25.13 kg/m    See OB flowsheet  + fetal movement, no contractions, no bleeding, no loss of fluid   Discussed monitoring fetal movement     1) concerns: discussed thyroid  2) Routine: Blood type A+ RI tdap [ ] flu [ ] GS [nl, 4th BOY] NIPT [nl] AFP [declines]   Covid v [v x 2] gtt [ ]  Ppbc   3) Risk factors:   A: CS x 3: RCS  25  B: hypothyroid: recheck per TM, TSH 4.3 on 25, inc to 175 mcg- recheck next visit   TSH   1st trimester:  0.1-2.5 mU/L   2nd trimester:  0.2-3 mU/L  3rd trimester:  0.3-3 mU/L      4) Return: 2 weeks    [facility]

## 2025-02-13 NOTE — PATIENT INSTRUCTIONS
"Return 2 weeks  Return to clinic:  every 4 weeks till 28 weeks, then every 2 weeks till 36 weeks, then weekly till delivery  Please schedule out a few ob visits at a time so that you can get an appointment as you would like.     For tour call [phone], tours on Thursdays @ 4:30 and 5:30 pm    Phone numbers Iris:  Day/ night [phone] ask for provider with buttons  Emergency:  call [phone] and push button asking for immediate assistance    What should I call about??    Contraction every 5 minutes for 1 hour 1 minute long (511), bleeding, loss of fluid, headache that doesn't resolve with tylenol, and decreased fetal movement     Start kick counts @ 26-28 weeks   There is an carlito for this!  It is called \"count the kicks\"  Keep track of movement and discover your normal baby movement pattern   guideline is listed below  Please call if you do not feel the baby move!  We will have you come in for fetal heart rate monitoring:   Perception of at least 10 FMs during 12 hours of normal maternal activity   Perception of least 10 FMs over two hours when the mother is at rest and focused on [facility] Address   [address], [place] [address] [phone]    Dr. Neris Nur, DO    OB/GYN   [facility] and [facility]                                                    "

## 2025-02-27 ENCOUNTER — PRENATAL OFFICE VISIT (OUTPATIENT)
Dept: OBGYN | Facility: CLINIC | Age: 35
End: 2025-02-27
Payer: COMMERCIAL

## 2025-02-27 VITALS — DIASTOLIC BLOOD PRESSURE: 62 MMHG | WEIGHT: 136 LBS | SYSTOLIC BLOOD PRESSURE: 110 MMHG | BODY MASS INDEX: 25.7 KG/M2

## 2025-02-27 DIAGNOSIS — E06.3 HYPOTHYROIDISM DUE TO HASHIMOTO'S THYROIDITIS: Primary | ICD-10-CM

## 2025-02-27 LAB — TSH SERPL DL<=0.005 MIU/L-ACNC: 3.04 UIU/ML (ref 0.3–4.2)

## 2025-02-27 NOTE — PATIENT INSTRUCTIONS
"Return 2 weeks  Return to clinic:  every 4 weeks till 28 weeks, then every 2 weeks till 36 weeks, then weekly till delivery  Please schedule out a few ob visits at a time so that you can get an appointment as you would like.     For tour call [phone], tours on Thursdays @ 4:30 and 5:30 pm    Phone numbers Iris:  Day/ night [phone] ask for provider with buttons  Emergency:  call [phone] and push button asking for immediate assistance    What should I call about??    Contraction every 5 minutes for 1 hour 1 minute long (511), bleeding, loss of fluid, headache that doesn't resolve with tylenol, and decreased fetal movement     Start kick counts @ 26-28 weeks   There is an carlito for this!  It is called \"count the kicks\"  Keep track of movement and discover your normal baby movement pattern   guideline is listed below  Please call if you do not feel the baby move!  We will have you come in for fetal heart rate monitoring:   Perception of at least 10 FMs during 12 hours of normal maternal activity   Perception of least 10 FMs over two hours when the mother is at rest and focused on [facility] Address   [address], [place] [address] [phone]    Dr. Neris Nur, DO    OB/GYN   [facility] and [facility]                                                    "
no

## 2025-02-27 NOTE — PROGRESS NOTES
CC: Here for routine prenatal visit   34 year old y/o  @ 31w3d with Estimated Date of Delivery: 2025     /62   Wt 61.7 kg (136 lb)   LMP 2024 (Exact Date)   BMI 25.70 kg/m    See OB flowsheet  + fetal movement, no contractions, no bleeding, no loss of fluid   Discussed monitoring fetal movement     1) Concerns: doing well, recheck TSH today  2) Routine: Blood type A+ RI tdap [ ] flu [ ] GS [nl, 4th BOY] NIPT [nl] AFP [declines]   Covid v [v x 2] gtt [ ]  Ppbc   3) Risk factors:   A: CS x 3: RCS  25  B: hypothyroid: recheck per TM, TSH 4.3 on 25, inc to 175 mcg- recheck next visit   TSH   1st trimester:  0.1-2.5 mU/L   2nd trimester:  0.2-3 mU/L  3rd trimester:  0.3-3 mU/L    4) Return: 2 weeks    [facility]

## 2025-02-27 NOTE — NURSING NOTE
"Chief Complaint   Patient presents with    Prenatal Care     31w3d     initial /62   Wt 61.7 kg (136 lb)   LMP 07/22/2024 (Exact Date)   BMI 25.70 kg/m   Estimated body mass index is 25.7 kg/m  as calculated from the following:    Height as of 12/30/24: 1.549 m (5' 1\").    Weight as of this encounter: 61.7 kg (136 lb).  BP completed using cuff size regular    +FM daily    Verito Roach CMA on 2/27/2025 at 12:52 PM    "

## 2025-03-13 ENCOUNTER — PRENATAL OFFICE VISIT (OUTPATIENT)
Dept: OBGYN | Facility: CLINIC | Age: 35
End: 2025-03-13
Payer: COMMERCIAL

## 2025-03-13 VITALS — WEIGHT: 137.8 LBS | SYSTOLIC BLOOD PRESSURE: 98 MMHG | DIASTOLIC BLOOD PRESSURE: 52 MMHG | BODY MASS INDEX: 26.04 KG/M2

## 2025-03-13 DIAGNOSIS — Z34.80 SUPERVISION OF OTHER NORMAL PREGNANCY, ANTEPARTUM: ICD-10-CM

## 2025-03-13 DIAGNOSIS — Z98.891 HISTORY OF CESAREAN SECTION: ICD-10-CM

## 2025-03-13 DIAGNOSIS — E06.3 HYPOTHYROIDISM DUE TO HASHIMOTO'S THYROIDITIS: Primary | ICD-10-CM

## 2025-03-13 NOTE — PROGRESS NOTES
CC: Here for routine prenatal visit   34 year old y/o  @ 33w3d with Estimated Date of Delivery: 2025     BP 98/52   Wt 62.5 kg (137 lb 12.8 oz)   LMP 2024 (Exact Date)   BMI 26.04 kg/m    See OB flowsheet  + fetal movement, no contractions, no bleeding, no loss of fluid   Discussed monitoring fetal movement     1) concerns: doing well   2) Routine: Blood type A+ RI tdap [ ] flu [ ] GS [nl, 4th BOY] NIPT [nl] AFP [declines]   Covid v [v x 2] gtt [ ]  Ppbc:  condoms  3) Risk factors:   A: CS x 3: RCS  25  B: hypothyroid: recheck per TM, TSH 3 on 25, inc to 200  mcg- recheck next visit   TSH   1st trimester:  0.1-2.5 mU/L   2nd trimester:  0.2-3 mU/L  3rd trimester:  0.3-3 mU/L      4) Return: 2 weeks     [facility]

## 2025-03-13 NOTE — PATIENT INSTRUCTIONS
"Return 2 weeks   Return to clinic:  every 4 weeks till 28 weeks, then every 2 weeks till 36 weeks, then weekly till delivery  Please schedule out a few ob visits at a time so that you can get an appointment as you would like.     For tour call [phone], tours on Thursdays @ 4:30 and 5:30 pm    Phone numbers Iris:  Day/ night [phone] ask for provider with buttons  Emergency:  call [phone] and push button asking for immediate assistance    What should I call about??    Contraction every 5 minutes for 1 hour 1 minute long (511), bleeding, loss of fluid, headache that doesn't resolve with tylenol, and decreased fetal movement     Start kick counts @ 26-28 weeks   There is an carlito for this!  It is called \"count the kicks\"  Keep track of movement and discover your normal baby movement pattern   guideline is listed below  Please call if you do not feel the baby move!  We will have you come in for fetal heart rate monitoring:   Perception of at least 10 FMs during 12 hours of normal maternal activity   Perception of least 10 FMs over two hours when the mother is at rest and focused on [facility] Address   [address], [place] [address] [phone]    Dr. Neris Nur, DO    OB/GYN   [facility] and [facility]                                                    "

## 2025-03-13 NOTE — NURSING NOTE
"33w3d    Chief Complaint   Patient presents with    Prenatal Care     initial BP 98/52   Wt 62.5 kg (137 lb 12.8 oz)   LMP 07/22/2024 (Exact Date)   BMI 26.04 kg/m   Estimated body mass index is 26.04 kg/m  as calculated from the following:    Height as of 12/30/24: 1.549 m (5' 1\").    Weight as of this encounter: 62.5 kg (137 lb 12.8 oz).  BP completed using cuff size regular    Nani Kapoor CMA on 3/13/2025 at 1:48 PM    "

## 2025-04-08 ENCOUNTER — MYC MEDICAL ADVICE (OUTPATIENT)
Dept: OBGYN | Facility: CLINIC | Age: 35
End: 2025-04-08
Payer: COMMERCIAL

## 2025-04-08 DIAGNOSIS — E06.3 HYPOTHYROIDISM DUE TO HASHIMOTO'S THYROIDITIS: Primary | ICD-10-CM

## 2025-04-08 RX ORDER — LEVOTHYROXINE SODIUM 175 UG/1
175 TABLET ORAL
Qty: 90 TABLET | Refills: 1 | Status: SHIPPED | OUTPATIENT
Start: 2025-04-08

## 2025-04-10 ENCOUNTER — PRENATAL OFFICE VISIT (OUTPATIENT)
Dept: OBGYN | Facility: CLINIC | Age: 35
End: 2025-04-10
Payer: COMMERCIAL

## 2025-04-10 VITALS
BODY MASS INDEX: 26.87 KG/M2 | DIASTOLIC BLOOD PRESSURE: 68 MMHG | SYSTOLIC BLOOD PRESSURE: 120 MMHG | WEIGHT: 142.3 LBS | HEIGHT: 61 IN

## 2025-04-10 DIAGNOSIS — Z34.80 SUPERVISION OF OTHER NORMAL PREGNANCY, ANTEPARTUM: Primary | ICD-10-CM

## 2025-04-10 DIAGNOSIS — Z98.891 HISTORY OF 3 CESAREAN SECTIONS: ICD-10-CM

## 2025-04-10 DIAGNOSIS — E06.3 HYPOTHYROIDISM DUE TO HASHIMOTO'S THYROIDITIS: ICD-10-CM

## 2025-04-10 NOTE — PROGRESS NOTES
[facility]  Return OB Visit    Yary Muñoz is a 34 year old  who presents at 37w3d for SHITAL.    1) concerns: doing well, needs to think of a name for this baby boy  2) Routine: Blood type A+ RI tdap [ ] flu [ ] GS [nl, 4th BOY] NIPT [nl] AFP [declines]   Covid v [v x 2] gtt [ ] GBS pos  Ppbc:  condoms  3) Risk factors:   A: CS x 3: RCS  25  B: hypothyroid: recheck per TM, TSH 0.29 on 25 - current dose 200 mwf, 175 other days  TSH   1st trimester:  0.1-2.5 mU/L   2nd trimester:  0.2-3 mU/L  3rd trimester:  0.3-3 mU/L     Return to clinic in 1 weeks, sooner if concerns. Reviewed fetal kick counts, PTL, and PreE signs.    Bobo Covington MD MPH  [facility]  04/10/2025 2:21 PM

## 2025-04-24 ENCOUNTER — ANESTHESIA (OUTPATIENT)
Dept: OBGYN | Facility: CLINIC | Age: 35
End: 2025-04-24
Payer: COMMERCIAL

## 2025-04-24 ENCOUNTER — HOSPITAL ENCOUNTER (INPATIENT)
Facility: CLINIC | Age: 35
End: 2025-04-24
Attending: FAMILY MEDICINE | Admitting: FAMILY MEDICINE
Payer: COMMERCIAL

## 2025-04-24 ENCOUNTER — ANESTHESIA EVENT (OUTPATIENT)
Dept: OBGYN | Facility: CLINIC | Age: 35
End: 2025-04-24
Payer: COMMERCIAL

## 2025-04-24 DIAGNOSIS — E03.9 HYPOTHYROIDISM, UNSPECIFIED TYPE: ICD-10-CM

## 2025-04-24 DIAGNOSIS — Z98.891 S/P CESAREAN SECTION: Primary | ICD-10-CM

## 2025-04-24 LAB
ABO + RH BLD: NORMAL
BLD GP AB SCN SERPL QL: NEGATIVE
HGB BLD-MCNC: 12 G/DL (ref 11.7–15.7)
SPECIMEN EXP DATE BLD: NORMAL
T PALLIDUM AB SER QL: NONREACTIVE

## 2025-04-24 PROCEDURE — 86780 TREPONEMA PALLIDUM: CPT | Performed by: FAMILY MEDICINE

## 2025-04-24 PROCEDURE — 360N000076 HC SURGERY LEVEL 3, PER MIN: Performed by: FAMILY MEDICINE

## 2025-04-24 PROCEDURE — 258N000003 HC RX IP 258 OP 636: Performed by: NURSE ANESTHETIST, CERTIFIED REGISTERED

## 2025-04-24 PROCEDURE — 272N000001 HC OR GENERAL SUPPLY STERILE: Performed by: FAMILY MEDICINE

## 2025-04-24 PROCEDURE — 370N000017 HC ANESTHESIA TECHNICAL FEE, PER MIN: Performed by: FAMILY MEDICINE

## 2025-04-24 PROCEDURE — 59510 CESAREAN DELIVERY: CPT | Performed by: FAMILY MEDICINE

## 2025-04-24 PROCEDURE — 250N000011 HC RX IP 250 OP 636: Mod: JZ | Performed by: NURSE ANESTHETIST, CERTIFIED REGISTERED

## 2025-04-24 PROCEDURE — 250N000011 HC RX IP 250 OP 636: Mod: JW | Performed by: ANESTHESIOLOGY

## 2025-04-24 PROCEDURE — 3E0P05Z INTRODUCTION OF ADHESION BARRIER INTO FEMALE REPRODUCTIVE, OPEN APPROACH: ICD-10-PCS | Performed by: FAMILY MEDICINE

## 2025-04-24 PROCEDURE — 710N000009 HC RECOVERY PHASE 1, LEVEL 1, PER MIN: Performed by: FAMILY MEDICINE

## 2025-04-24 PROCEDURE — 250N000011 HC RX IP 250 OP 636: Mod: JZ | Performed by: FAMILY MEDICINE

## 2025-04-24 PROCEDURE — 250N000009 HC RX 250: Performed by: FAMILY MEDICINE

## 2025-04-24 PROCEDURE — 86900 BLOOD TYPING SEROLOGIC ABO: CPT | Performed by: FAMILY MEDICINE

## 2025-04-24 PROCEDURE — 250N000011 HC RX IP 250 OP 636: Performed by: FAMILY MEDICINE

## 2025-04-24 PROCEDURE — 85018 HEMOGLOBIN: CPT | Performed by: FAMILY MEDICINE

## 2025-04-24 PROCEDURE — 120N000001 HC R&B MED SURG/OB

## 2025-04-24 PROCEDURE — 258N000003 HC RX IP 258 OP 636: Performed by: FAMILY MEDICINE

## 2025-04-24 PROCEDURE — 250N000013 HC RX MED GY IP 250 OP 250 PS 637: Performed by: FAMILY MEDICINE

## 2025-04-24 RX ORDER — OXYTOCIN 10 [USP'U]/ML
10 INJECTION, SOLUTION INTRAMUSCULAR; INTRAVENOUS
Status: DISCONTINUED | OUTPATIENT
Start: 2025-04-24 | End: 2025-04-26 | Stop reason: HOSPADM

## 2025-04-24 RX ORDER — OXYTOCIN/0.9 % SODIUM CHLORIDE 30/500 ML
340 PLASTIC BAG, INJECTION (ML) INTRAVENOUS CONTINUOUS PRN
Status: DISCONTINUED | OUTPATIENT
Start: 2025-04-24 | End: 2025-04-24 | Stop reason: HOSPADM

## 2025-04-24 RX ORDER — LOPERAMIDE HYDROCHLORIDE 2 MG/1
2 CAPSULE ORAL
Status: DISCONTINUED | OUTPATIENT
Start: 2025-04-24 | End: 2025-04-24 | Stop reason: HOSPADM

## 2025-04-24 RX ORDER — IBUPROFEN 800 MG/1
800 TABLET, FILM COATED ORAL EVERY 6 HOURS
Status: DISCONTINUED | OUTPATIENT
Start: 2025-04-25 | End: 2025-04-26 | Stop reason: HOSPADM

## 2025-04-24 RX ORDER — MISOPROSTOL 200 UG/1
400 TABLET ORAL
Status: DISCONTINUED | OUTPATIENT
Start: 2025-04-24 | End: 2025-04-26 | Stop reason: HOSPADM

## 2025-04-24 RX ORDER — LOPERAMIDE HYDROCHLORIDE 2 MG/1
4 CAPSULE ORAL
Status: DISCONTINUED | OUTPATIENT
Start: 2025-04-24 | End: 2025-04-24 | Stop reason: HOSPADM

## 2025-04-24 RX ORDER — SODIUM PHOSPHATE,MONO-DIBASIC 19G-7G/118
1 ENEMA (ML) RECTAL DAILY PRN
Status: DISCONTINUED | OUTPATIENT
Start: 2025-04-26 | End: 2025-04-26 | Stop reason: HOSPADM

## 2025-04-24 RX ORDER — BISACODYL 10 MG
10 SUPPOSITORY, RECTAL RECTAL DAILY PRN
Status: DISCONTINUED | OUTPATIENT
Start: 2025-04-26 | End: 2025-04-26 | Stop reason: HOSPADM

## 2025-04-24 RX ORDER — METOCLOPRAMIDE HYDROCHLORIDE 5 MG/ML
10 INJECTION INTRAMUSCULAR; INTRAVENOUS EVERY 6 HOURS PRN
Status: DISCONTINUED | OUTPATIENT
Start: 2025-04-24 | End: 2025-04-26 | Stop reason: HOSPADM

## 2025-04-24 RX ORDER — ACETAMINOPHEN 325 MG/1
975 TABLET ORAL ONCE
Status: COMPLETED | OUTPATIENT
Start: 2025-04-24 | End: 2025-04-24

## 2025-04-24 RX ORDER — CEFAZOLIN SODIUM/WATER 2 G/20 ML
2 SYRINGE (ML) INTRAVENOUS SEE ADMIN INSTRUCTIONS
Status: DISCONTINUED | OUTPATIENT
Start: 2025-04-24 | End: 2025-04-24 | Stop reason: HOSPADM

## 2025-04-24 RX ORDER — METHYLERGONOVINE MALEATE 0.2 MG/ML
200 INJECTION INTRAVENOUS
Status: DISCONTINUED | OUTPATIENT
Start: 2025-04-24 | End: 2025-04-26 | Stop reason: HOSPADM

## 2025-04-24 RX ORDER — AMOXICILLIN 250 MG
1 CAPSULE ORAL 2 TIMES DAILY
Status: DISCONTINUED | OUTPATIENT
Start: 2025-04-24 | End: 2025-04-26 | Stop reason: HOSPADM

## 2025-04-24 RX ORDER — FENTANYL CITRATE 50 UG/ML
INJECTION, SOLUTION INTRAMUSCULAR; INTRAVENOUS
Status: COMPLETED | OUTPATIENT
Start: 2025-04-24 | End: 2025-04-24

## 2025-04-24 RX ORDER — MISOPROSTOL 200 UG/1
800 TABLET ORAL
Status: DISCONTINUED | OUTPATIENT
Start: 2025-04-24 | End: 2025-04-26 | Stop reason: HOSPADM

## 2025-04-24 RX ORDER — LOPERAMIDE HYDROCHLORIDE 2 MG/1
2 CAPSULE ORAL
Status: DISCONTINUED | OUTPATIENT
Start: 2025-04-24 | End: 2025-04-26 | Stop reason: HOSPADM

## 2025-04-24 RX ORDER — TRANEXAMIC ACID 10 MG/ML
1 INJECTION, SOLUTION INTRAVENOUS EVERY 30 MIN PRN
Status: DISCONTINUED | OUTPATIENT
Start: 2025-04-24 | End: 2025-04-26 | Stop reason: HOSPADM

## 2025-04-24 RX ORDER — NALOXONE HYDROCHLORIDE 0.4 MG/ML
0.2 INJECTION, SOLUTION INTRAMUSCULAR; INTRAVENOUS; SUBCUTANEOUS
Status: DISCONTINUED | OUTPATIENT
Start: 2025-04-24 | End: 2025-04-26 | Stop reason: HOSPADM

## 2025-04-24 RX ORDER — OXYTOCIN/0.9 % SODIUM CHLORIDE 30/500 ML
340 PLASTIC BAG, INJECTION (ML) INTRAVENOUS CONTINUOUS PRN
Status: DISCONTINUED | OUTPATIENT
Start: 2025-04-24 | End: 2025-04-26 | Stop reason: HOSPADM

## 2025-04-24 RX ORDER — ONDANSETRON 2 MG/ML
INJECTION INTRAMUSCULAR; INTRAVENOUS PRN
Status: DISCONTINUED | OUTPATIENT
Start: 2025-04-24 | End: 2025-04-24

## 2025-04-24 RX ORDER — KETOROLAC TROMETHAMINE 30 MG/ML
INJECTION, SOLUTION INTRAMUSCULAR; INTRAVENOUS PRN
Status: DISCONTINUED | OUTPATIENT
Start: 2025-04-24 | End: 2025-04-24

## 2025-04-24 RX ORDER — CITRIC ACID/SODIUM CITRATE 334-500MG
30 SOLUTION, ORAL ORAL
Status: COMPLETED | OUTPATIENT
Start: 2025-04-24 | End: 2025-04-24

## 2025-04-24 RX ORDER — DEXTROSE, SODIUM CHLORIDE, SODIUM LACTATE, POTASSIUM CHLORIDE, AND CALCIUM CHLORIDE 5; .6; .31; .03; .02 G/100ML; G/100ML; G/100ML; G/100ML; G/100ML
INJECTION, SOLUTION INTRAVENOUS CONTINUOUS
Status: DISCONTINUED | OUTPATIENT
Start: 2025-04-24 | End: 2025-04-26 | Stop reason: HOSPADM

## 2025-04-24 RX ORDER — AMOXICILLIN 250 MG
2 CAPSULE ORAL 2 TIMES DAILY
Status: DISCONTINUED | OUTPATIENT
Start: 2025-04-24 | End: 2025-04-26 | Stop reason: HOSPADM

## 2025-04-24 RX ORDER — DEXAMETHASONE SODIUM PHOSPHATE 4 MG/ML
INJECTION, SOLUTION INTRA-ARTICULAR; INTRALESIONAL; INTRAMUSCULAR; INTRAVENOUS; SOFT TISSUE PRN
Status: DISCONTINUED | OUTPATIENT
Start: 2025-04-24 | End: 2025-04-24

## 2025-04-24 RX ORDER — METHYLERGONOVINE MALEATE 0.2 MG/ML
200 INJECTION INTRAVENOUS
Status: DISCONTINUED | OUTPATIENT
Start: 2025-04-24 | End: 2025-04-24 | Stop reason: HOSPADM

## 2025-04-24 RX ORDER — OXYCODONE HYDROCHLORIDE 5 MG/1
5 TABLET ORAL EVERY 4 HOURS PRN
Status: DISCONTINUED | OUTPATIENT
Start: 2025-04-24 | End: 2025-04-26 | Stop reason: HOSPADM

## 2025-04-24 RX ORDER — OXYTOCIN/0.9 % SODIUM CHLORIDE 30/500 ML
100-340 PLASTIC BAG, INJECTION (ML) INTRAVENOUS CONTINUOUS PRN
Status: DISCONTINUED | OUTPATIENT
Start: 2025-04-24 | End: 2025-04-26 | Stop reason: HOSPADM

## 2025-04-24 RX ORDER — LIDOCAINE 40 MG/G
CREAM TOPICAL
Status: DISCONTINUED | OUTPATIENT
Start: 2025-04-24 | End: 2025-04-24 | Stop reason: HOSPADM

## 2025-04-24 RX ORDER — MISOPROSTOL 200 UG/1
800 TABLET ORAL
Status: DISCONTINUED | OUTPATIENT
Start: 2025-04-24 | End: 2025-04-24 | Stop reason: HOSPADM

## 2025-04-24 RX ORDER — ONDANSETRON 4 MG/1
4 TABLET, ORALLY DISINTEGRATING ORAL EVERY 6 HOURS PRN
Status: DISCONTINUED | OUTPATIENT
Start: 2025-04-24 | End: 2025-04-26 | Stop reason: HOSPADM

## 2025-04-24 RX ORDER — KETOROLAC TROMETHAMINE 15 MG/ML
15 INJECTION, SOLUTION INTRAMUSCULAR; INTRAVENOUS EVERY 6 HOURS
Status: COMPLETED | OUTPATIENT
Start: 2025-04-24 | End: 2025-04-25

## 2025-04-24 RX ORDER — SIMETHICONE 80 MG
80 TABLET,CHEWABLE ORAL 4 TIMES DAILY PRN
Status: DISCONTINUED | OUTPATIENT
Start: 2025-04-24 | End: 2025-04-26 | Stop reason: HOSPADM

## 2025-04-24 RX ORDER — METOCLOPRAMIDE 10 MG/1
10 TABLET ORAL EVERY 6 HOURS PRN
Status: DISCONTINUED | OUTPATIENT
Start: 2025-04-24 | End: 2025-04-26 | Stop reason: HOSPADM

## 2025-04-24 RX ORDER — MISOPROSTOL 200 UG/1
400 TABLET ORAL
Status: DISCONTINUED | OUTPATIENT
Start: 2025-04-24 | End: 2025-04-24 | Stop reason: HOSPADM

## 2025-04-24 RX ORDER — LIDOCAINE 40 MG/G
CREAM TOPICAL
Status: DISCONTINUED | OUTPATIENT
Start: 2025-04-24 | End: 2025-04-26 | Stop reason: HOSPADM

## 2025-04-24 RX ORDER — CARBOPROST TROMETHAMINE 250 UG/ML
250 INJECTION, SOLUTION INTRAMUSCULAR
Status: DISCONTINUED | OUTPATIENT
Start: 2025-04-24 | End: 2025-04-26 | Stop reason: HOSPADM

## 2025-04-24 RX ORDER — OXYTOCIN 10 [USP'U]/ML
10 INJECTION, SOLUTION INTRAMUSCULAR; INTRAVENOUS
Status: DISCONTINUED | OUTPATIENT
Start: 2025-04-24 | End: 2025-04-24 | Stop reason: HOSPADM

## 2025-04-24 RX ORDER — NALOXONE HYDROCHLORIDE 0.4 MG/ML
0.4 INJECTION, SOLUTION INTRAMUSCULAR; INTRAVENOUS; SUBCUTANEOUS
Status: DISCONTINUED | OUTPATIENT
Start: 2025-04-24 | End: 2025-04-26 | Stop reason: HOSPADM

## 2025-04-24 RX ORDER — CEFAZOLIN SODIUM/WATER 2 G/20 ML
2 SYRINGE (ML) INTRAVENOUS
Status: COMPLETED | OUTPATIENT
Start: 2025-04-24 | End: 2025-04-24

## 2025-04-24 RX ORDER — HYDROCORTISONE 25 MG/G
CREAM TOPICAL 3 TIMES DAILY PRN
Status: DISCONTINUED | OUTPATIENT
Start: 2025-04-24 | End: 2025-04-26 | Stop reason: HOSPADM

## 2025-04-24 RX ORDER — LOPERAMIDE HYDROCHLORIDE 2 MG/1
4 CAPSULE ORAL
Status: DISCONTINUED | OUTPATIENT
Start: 2025-04-24 | End: 2025-04-26 | Stop reason: HOSPADM

## 2025-04-24 RX ORDER — SODIUM CHLORIDE, SODIUM LACTATE, POTASSIUM CHLORIDE, CALCIUM CHLORIDE 600; 310; 30; 20 MG/100ML; MG/100ML; MG/100ML; MG/100ML
INJECTION, SOLUTION INTRAVENOUS CONTINUOUS
Status: DISCONTINUED | OUTPATIENT
Start: 2025-04-24 | End: 2025-04-24 | Stop reason: HOSPADM

## 2025-04-24 RX ORDER — TRANEXAMIC ACID 10 MG/ML
1 INJECTION, SOLUTION INTRAVENOUS EVERY 30 MIN PRN
Status: DISCONTINUED | OUTPATIENT
Start: 2025-04-24 | End: 2025-04-24 | Stop reason: HOSPADM

## 2025-04-24 RX ORDER — CARBOPROST TROMETHAMINE 250 UG/ML
250 INJECTION, SOLUTION INTRAMUSCULAR
Status: DISCONTINUED | OUTPATIENT
Start: 2025-04-24 | End: 2025-04-24 | Stop reason: HOSPADM

## 2025-04-24 RX ORDER — ONDANSETRON 2 MG/ML
4 INJECTION INTRAMUSCULAR; INTRAVENOUS EVERY 6 HOURS PRN
Status: DISCONTINUED | OUTPATIENT
Start: 2025-04-24 | End: 2025-04-26 | Stop reason: HOSPADM

## 2025-04-24 RX ORDER — ACETAMINOPHEN 325 MG/1
975 TABLET ORAL EVERY 6 HOURS
Status: DISCONTINUED | OUTPATIENT
Start: 2025-04-24 | End: 2025-04-26 | Stop reason: HOSPADM

## 2025-04-24 RX ORDER — PROCHLORPERAZINE MALEATE 10 MG
10 TABLET ORAL EVERY 6 HOURS PRN
Status: DISCONTINUED | OUTPATIENT
Start: 2025-04-24 | End: 2025-04-26 | Stop reason: HOSPADM

## 2025-04-24 RX ORDER — MORPHINE SULFATE 1 MG/ML
INJECTION, SOLUTION EPIDURAL; INTRATHECAL; INTRAVENOUS
Status: COMPLETED | OUTPATIENT
Start: 2025-04-24 | End: 2025-04-24

## 2025-04-24 RX ORDER — BUPIVACAINE HYDROCHLORIDE 7.5 MG/ML
INJECTION, SOLUTION INTRASPINAL
Status: COMPLETED | OUTPATIENT
Start: 2025-04-24 | End: 2025-04-24

## 2025-04-24 RX ADMIN — DEXAMETHASONE SODIUM PHOSPHATE 8 MG: 4 INJECTION, SOLUTION INTRA-ARTICULAR; INTRALESIONAL; INTRAMUSCULAR; INTRAVENOUS; SOFT TISSUE at 07:34

## 2025-04-24 RX ADMIN — MORPHINE SULFATE 0.2 MG: 1 INJECTION, SOLUTION EPIDURAL; INTRATHECAL; INTRAVENOUS at 07:26

## 2025-04-24 RX ADMIN — KETOROLAC TROMETHAMINE 15 MG: 15 INJECTION, SOLUTION INTRAMUSCULAR; INTRAVENOUS at 14:17

## 2025-04-24 RX ADMIN — SODIUM CHLORIDE, SODIUM LACTATE, POTASSIUM CHLORIDE, AND CALCIUM CHLORIDE: .6; .31; .03; .02 INJECTION, SOLUTION INTRAVENOUS at 07:24

## 2025-04-24 RX ADMIN — PHENYLEPHRINE HYDROCHLORIDE 100 MCG: 10 INJECTION INTRAVENOUS at 07:31

## 2025-04-24 RX ADMIN — Medication 2 G: at 07:19

## 2025-04-24 RX ADMIN — SODIUM CHLORIDE, SODIUM LACTATE, POTASSIUM CHLORIDE, AND CALCIUM CHLORIDE: .6; .31; .03; .02 INJECTION, SOLUTION INTRAVENOUS at 08:02

## 2025-04-24 RX ADMIN — SODIUM CITRATE AND CITRIC ACID MONOHYDRATE 30 ML: 334; 500 SOLUTION ORAL at 07:05

## 2025-04-24 RX ADMIN — ACETAMINOPHEN 975 MG: 325 TABLET, FILM COATED ORAL at 12:31

## 2025-04-24 RX ADMIN — ACETAMINOPHEN 975 MG: 325 TABLET, FILM COATED ORAL at 18:48

## 2025-04-24 RX ADMIN — KETOROLAC TROMETHAMINE 30 MG: 30 INJECTION, SOLUTION INTRAMUSCULAR at 08:12

## 2025-04-24 RX ADMIN — PHENYLEPHRINE HYDROCHLORIDE 100 MCG: 10 INJECTION INTRAVENOUS at 07:35

## 2025-04-24 RX ADMIN — Medication 340 ML/HR: at 07:47

## 2025-04-24 RX ADMIN — PHENYLEPHRINE HYDROCHLORIDE 0.5 MCG/KG/MIN: 10 INJECTION INTRAVENOUS at 07:30

## 2025-04-24 RX ADMIN — BUPIVACAINE HYDROCHLORIDE IN DEXTROSE 1.6 ML: 7.5 INJECTION, SOLUTION SUBARACHNOID at 07:26

## 2025-04-24 RX ADMIN — ONDANSETRON 4 MG: 2 INJECTION INTRAMUSCULAR; INTRAVENOUS at 07:32

## 2025-04-24 RX ADMIN — FENTANYL CITRATE 15 MCG: 50 INJECTION INTRAMUSCULAR; INTRAVENOUS at 07:26

## 2025-04-24 RX ADMIN — SODIUM CHLORIDE, SODIUM LACTATE, POTASSIUM CHLORIDE, AND CALCIUM CHLORIDE 500 ML: .6; .31; .03; .02 INJECTION, SOLUTION INTRAVENOUS at 06:53

## 2025-04-24 RX ADMIN — KETOROLAC TROMETHAMINE 15 MG: 15 INJECTION, SOLUTION INTRAMUSCULAR; INTRAVENOUS at 20:42

## 2025-04-24 RX ADMIN — ACETAMINOPHEN 975 MG: 325 TABLET, FILM COATED ORAL at 06:30

## 2025-04-24 RX ADMIN — SENNOSIDES AND DOCUSATE SODIUM 1 TABLET: 50; 8.6 TABLET ORAL at 20:43

## 2025-04-24 RX ADMIN — PHENYLEPHRINE HYDROCHLORIDE 100 MCG: 10 INJECTION INTRAVENOUS at 07:29

## 2025-04-24 ASSESSMENT — ACTIVITIES OF DAILY LIVING (ADL)
ADLS_ACUITY_SCORE: 16
ADLS_ACUITY_SCORE: 18
ADLS_ACUITY_SCORE: 16
ADLS_ACUITY_SCORE: 18
ADLS_ACUITY_SCORE: 18
ADLS_ACUITY_SCORE: 16
ADLS_ACUITY_SCORE: 16
ADLS_ACUITY_SCORE: 18
ADLS_ACUITY_SCORE: 16

## 2025-04-24 NOTE — CARE PLAN
Data: [facility] to [facility]: 2025  5:44 AM.  Patient admitted for scheduled repeat . Patient is a .  Prenatal record reviewed. Pregnancy  has been complicated by hypothryoidism.  Gestational Age 39w3d. VSS. Fetal movement active. Patient denies uterine contractions, leaking of vaginal fluid/rupture of membranes, vaginal bleeding, abdominal pain, pelvic pressure, nausea, vomiting, headache, visual disturbances, epigastric or URQ pain, significant edema. Support person is present.   Action: Verbal consent for EFM.  Admission assessment completed. Bill of rights reviewed.  Response: Patient verbalized agreement with plan.

## 2025-04-24 NOTE — PLAN OF CARE
Data: Patient Yary Muñoz transferred to postpartum unit (#[id number]) via cart with baby in arms @ 1020. Vital signs stable upon transfer w/ the exception of mild BPs.  Postpartum checks WNL-- see flow sheet. Patient U/O @ 1015 (150 mL).  Patient given Toradol @ 0812. Patient's IV is intact and infusing Pit @ 100 mL /hr. Patient R/L leg remains a little numb - transfer of 2. Report given to PP ENDY Gil.     Action: Patient oriented to surroundings and call light within reach. ID bands double-checked with PP nurse Gil.     Response: Patient tolerated transfer and is stable.     BMAT Score: Patients mobililty level scored using the bedside mobility assistance tool (BMAT). Patient is at a mobility level test number: 1. Mobility equipment used: [employer]. Required assist of 1 staff members. Further use of BMAT scoring not required.    Problem: Adult Inpatient Plan of Care  Goal: Plan of Care Review  Outcome: Progressing  Goal: Patient-Specific Goal (Individualized)  Outcome: Progressing  Goal: Absence of Hospital-Acquired Illness or Injury  Outcome: Progressing  Goal: Optimal Comfort and Wellbeing  Outcome: Progressing  Goal: Readiness for Transition of Care  Outcome: Progressing     Problem:  Delivery  Goal: Bleeding is Controlled  Outcome: Progressing  Goal: Stable Fetal Wellbeing  Outcome: Progressing  Goal: Absence of Infection Signs and Symptoms  Outcome: Progressing  Goal: Effective Oxygenation and Ventilation  Outcome: Progressing     Problem: Postpartum ( Delivery)  Goal: Successful Parent Role Transition  Outcome: Progressing  Goal: Hemostasis  Outcome: Progressing  Goal: Effective Bowel Elimination  Outcome: Progressing  Goal: Fluid and Electrolyte Balance  Outcome: Progressing  Goal: Absence of Infection Signs and Symptoms  Outcome: Progressing  Goal: Anesthesia/Sedation Recovery  Outcome: Progressing  Goal: Optimal Pain Control and Function  Outcome: Progressing  Goal: Nausea and Vomiting  Relief  Outcome: Progressing  Goal: Effective Urinary Elimination  Outcome: Progressing  Goal: Effective Oxygenation and Ventilation  Outcome: Progressing

## 2025-04-24 NOTE — OP NOTE
PREOPERATIVE DIAGNOSIS: A 34 year old-year-old  at 39w3d weeks estimated gestational age admitted for repeat  section with history of  section x 3, hypothyroidism.  POSTOPERATIVE DIAGNOSES: A 34 year old-year-old  at 39w3d weeks estimated gestational age admitted for repeat  section with history of  section x 3, hypothyroidism.  PROCEDURE:   1. Repeat low transverse  section.   2. Seprafilm placement for adhesiolysis.   COMPLICATIONS: None apparent at time of procedure.   ESTIMATED BLOOD LOSS: 356  mL.   SURGEON: Neris Nur DO.   INDICATIONS: A 34 year old-year-old  at 39w3d weeks estimated gestational age admitted for repeat  section with history of  section x 3, hypothyroidism.  Patient signed consent for repeat  section, risks benefits, alternatives are discussed with the patient.    OUTCOME: Male infant 8 lb 3.4 oz  apgars 9 at 1 minute and 9 at 5 minutes.   Findings:   Normal uterus, tubes and ovaries.   DETAILS OF PROCEDURE: After informed consent was signed, the patient was placed in dorsal supine position, spinal placed for anesthesia. Fetal heart tones were obtained and found to be in 150s. The patient was prepped and draped in normal sterile fashion and a time out was performed. Pre-operative antibiotics were given. Adequate anesthesia was reported per patient after the spinal was dosed to a surgical level.  A pfannensteil incision was then carried down to the underlying fascia and incised in the midline. The fascia is dissected off the rectus muscles superiorly and inferiorly. Blunt/sharp dissection of the rectus muscles and peritoneum was performed and blunt stretching of the muscles was perfomed. The uterus was visualized.  An ana retractor is placed. The lower uterine segment was visualized. The lower uterine segment was incised with a scalpel. Blunt stretching was performed and the baby delivered  atraumatically.  The nose and mouth were bulb suctioned. The cord was clamped and cut after 1 minute.  Infant was taken over to the waiting  staff. The placenta spontaneously delivered. The uterus was cleared of all clots and debris. The placenta was examined and found to be complete.  The lower uterine segment was reapproximated with 0 Monocryl in a running locked fashion. A second layer of the same suture was used for imbrication. A developing hematoma was noted on the right hysterotomy corner, two figure of 8 sutures using 0-Monocryl were placed.  The hematoma was contained.  The hematoma was noted to extrude into the right broad ligament, it was quite firm.  The broad ligament was opened 2 cm cephalad to the right fallopian tube, 2 small 2 cm clots extruded.  No active bleeding was noted.  The broad ligament was closed, the area was less firm and hemostasis was noted.  The hematoma was stable and lessening at the closure of the case. The ana retractor is removed. Irrigation was performed. Seprafilm was placed over the lower uterine segment Hemostasis was noted. The peritoneum was closed with 3-0 monocryl. The rectus muscles were reapproximated in the midline and closed with several horizontal mattress sutures of 3-0 monocryl  Hemostasis was assured with Bovie cautery on the rectus muscles, and Seprafilm was placed over it. The fascia was reapproximated with 0 looped PDS in a running fashion with good reapproximation. A subcuticular stitch using 4-0 Monocryl was used to reapproximate the skin. Steri-Strips, telfa, and tegaderm was applied. The patient tolerated the procedure well. Sponge, lap and needle counts were correct x2.   JON COOMBS DO

## 2025-04-24 NOTE — PLAN OF CARE
Goal Outcome Evaluation:      Plan of Care Reviewed With: patient    Overall Patient Progress: improvingOverall Patient Progress: improving    Outcome Evaluation: VSS and WNL. Patient prepped for scheduled RCS and handoff given to oncoming RN.      Problem: Adult Inpatient Plan of Care  Goal: Plan of Care Review  Outcome: Progressing  Flowsheets (Taken 2025 0700)  Outcome Evaluation: VSS and WNL. Patient prepped for scheduled RCS and handoff given to oncoming RN.  Plan of Care Reviewed With: patient  Overall Patient Progress: improving  Goal: Patient-Specific Goal (Individualized)    Outcome: Progressing  Goal: Absence of Hospital-Acquired Illness or Injury  Outcome: Progressing  Intervention: Prevent Skin Injury  Recent Flowsheet Documentation  Taken 2025 by Alison Montoya RN  Body Position: position changed independently  Intervention: Prevent and Manage VTE (Venous Thromboembolism) Risk  Recent Flowsheet Documentation  Taken 2025 by Alison Montoya RN  VTE Prevention/Management: (SCDs to be applied in OR) other (see comments)  Intervention: Prevent Infection  Recent Flowsheet Documentation  Taken 2025 by Alison Montoya RN  Infection Prevention:   hand hygiene promoted   rest/sleep promoted   single patient room provided  Goal: Optimal Comfort and Wellbeing  Outcome: Progressing  Intervention: Provide Person-Centered Care  Recent Flowsheet Documentation  Taken 2025 by Alison Montoya RN  Trust Relationship/Rapport:   care explained   choices provided   questions answered   questions encouraged   reassurance provided   thoughts/feelings acknowledged  Goal: Readiness for Transition of Care  Outcome: Progressing  Intervention: Mutually Develop Transition Plan  Recent Flowsheet Documentation  Taken 2025 06 by Alison Montoya RN  Equipment Currently Used at Home: none     Problem:  Delivery  Goal: Bleeding is Controlled  Outcome: Progressing  Goal: Stable Fetal  Wellbeing  Outcome: Progressing  Intervention: Promote and Monitor Fetal Wellbeing  Recent Flowsheet Documentation  Taken 4/24/2025 0630 by Alison Montoya RN  Body Position: position changed independently  Goal: Absence of Infection Signs and Symptoms  Outcome: Progressing  Intervention: Prevent or Manage Infection  Recent Flowsheet Documentation  Taken 4/24/2025 0630 by Alison Montoya, RN  Infection Prevention:   hand hygiene promoted   rest/sleep promoted   single patient room provided  Infection Management: aseptic technique maintained  Goal: Effective Oxygenation and Ventilation  Outcome: Progressing

## 2025-04-24 NOTE — ANESTHESIA PREPROCEDURE EVALUATION
Anesthesia Pre-Procedure Evaluation    Patient: Yary Muñoz   MRN: [id number] : 1990        Procedure : Procedure(s):   SECTION          Past Medical History:   Diagnosis Date    Hypothyroid 2013    irregular period, weight gain, family history    Pelvic fracture (H)     age 15    PONV (postoperative nausea and vomiting)     Urinary tract infection     Varicella       Past Surgical History:   Procedure Laterality Date     SECTION N/A 2019    Procedure:  SECTION;  Surgeon: Enrique Delgado MD;  Location: [facility] OR     SECTION N/A 2020    Procedure: REPEAT  SECTION;  Surgeon: Neris Nur DO;  Location: [facility] L+D     SECTION N/A 2023    Procedure: REPEAT  SECTION;  Surgeon: Neris Nur DO;  Location: [facility] L+D    HC TOOTH EXTRACTION W/FORCEP        No Known Allergies   Social History     Tobacco Use    Smoking status: Never    Smokeless tobacco: Never   Substance Use Topics    Alcohol use: Not Currently      Wt Readings from Last 1 Encounters:   25 64 kg (141 lb)        Anesthesia Evaluation        History of anesthetic complications  - PONV.      ROS/MED HX  ENT/Pulmonary:       Neurologic:       Cardiovascular:       METS/Exercise Tolerance:     Hematologic:       Musculoskeletal:       GI/Hepatic:       Renal/Genitourinary:       Endo:     (+)          thyroid problem, hypothyroidism,           Psychiatric/Substance Use:       Infectious Disease:       Malignancy:       Other:      (+)  , ,previous  (x3)         Physical Exam    Airway        Mallampati: I   TM distance: > 3 FB   Neck ROM: full   Mouth opening: > 3 cm    Respiratory Devices and Support         Dental           Cardiovascular   cardiovascular exam normal          Pulmonary   pulmonary exam normal                OUTSIDE LABS:  CBC:   Lab Results   Component Value Date    WBC 9.2 2025    WBC 7.4 2024    HGB 12.0 2025     "HGB 11.7 01/31/2025    HCT 33.6 (L) 01/31/2025    HCT 36.6 09/26/2024     01/31/2025     09/26/2024     BMP:   Lab Results   Component Value Date     06/19/2024    POTASSIUM 4.0 06/19/2024    CHLORIDE 104 06/19/2024    CO2 23 06/19/2024    BUN 12.7 06/19/2024    CR 0.78 06/19/2024    GLC 96 06/19/2024     COAGS: No results found for: \"PTT\", \"INR\", \"FIBR\"  POC: No results found for: \"BGM\", \"HCG\", \"HCGS\"  HEPATIC:   Lab Results   Component Value Date    ALBUMIN 4.6 06/19/2024    PROTTOTAL 7.4 06/19/2024    ALT 14 06/19/2024    AST 29 06/19/2024    ALKPHOS 41 06/19/2024    BILITOTAL 0.3 06/19/2024     OTHER:   Lab Results   Component Value Date    VIK 9.5 06/19/2024    TSH 0.29 (L) 04/04/2025    T4 1.30 04/04/2025       Anesthesia Plan    ASA Status:  2       Anesthesia Type: Spinal.              Consents    Anesthesia Plan(s) and associated risks, benefits, and realistic alternatives discussed. Questions answered and patient/representative(s) expressed understanding.     - Discussed:     - Discussed with:  Patient, Spouse            Postoperative Care            Comments:               Britany Rajan MD    Clinically Significant Risk Factors Present on Admission                                          "

## 2025-04-24 NOTE — L&D DELIVERY NOTE
OB  Delivery Note      Yary Muñoz MRN# [id number]   Age: 34 year old YOB: 1990       GA: 39w3d  GP:   Labor Complications:    EBL:   mL  Delivery QBL:    Delivery Type: , Low Transverse  ROM to Delivery Time: rupture date or rupture time have not been documented     1 Minute 5 Minute 10 Minute   Apgar Totals: 9   9        Personel Present: YAKELIN MAGALLANES     Details    Pre-Op Diagnosis: 1. Intrauterine pregnancy at 39w3d  2. History of  section x 3   3. Hypothyroidism      Post-Op Diagnosis: 1. SAME   Indications:  Planned repeat   Procedure:   , Low Transverse via   incision   Anesthesia: Spinal     Informed Consent:  The risks, benefits, complications, and alternatives were discussed with the patient. The patient understood that the risks of  section include, but are not limited to: injury to nearby structures or organs, infection, blood loss and possible need for transfusion, and potential need for more surgery including hysterectomy. The patient stated understanding and desired to proceed. All questions were answered. The site of surgery was properly noted and marked. The patient was identified as Yary Muñoz and the procedure verified as a  delivery. A Time Out was held and the above information confirmed.    Procedure Details: see operative note       Diego Muñoz [[id number]]      Labor Length      3rd Stage (hrs): 0 (min): 1          Labor Events     labor?: No   steroids: None  Labor Type: Scheduled        Delivery/Placenta Date and Time      Delivery Date: 25 Delivery Time:  7:46 AM   Placenta Date/Time: 2025  7:48 AM  Delivering clinician: Neris Nur DO   Other personnel present at delivery:  Provider Role   Yakelin Magallanes, RN Registered Nurse             Apgars    Living status: Living   1 Minute 5 Minute 10 Minute 15 Minute 20 Minute   Skin color: 1   "1       Heart rate: 2  2       Reflex irritability: 2  2       Muscle tone: 2  2       Respiratory effort: 2  2       Total: 9  9       Apgars assigned by: YAKELIN BEASLEY RN       Cord      Vessels: 3 Vessels    Cord Complications: Nuchal   Nuchal Intervention: reduced         Nuchal cord description: loose nuchal cord         Cord Blood Disposition: Discard    Gases Sent?: No    Delayed cord clamping?: Yes    Cord Clamping Delay (seconds):  seconds           Dearborn Resuscitation    Methods: None        Measurements      Weight: 8 lb 3.4 oz Length: 1' 9.5\"     Head circumference: 35.6 cm           Skin to Skin and Feeding Plan      Skin to skin initiation date/time: 1841    Skin to skin with: Mother  Skin to skin end date/time:            Delivery (Maternal) (Provider to Complete) ([id number])           Blood Loss  Mother: Yary Muñoz #[id number]     Start of Mother's Information      Delivery Blood Loss   Intrapartum & Postpartum: 25 0741 - 25 0826    Delivery Admission: 25 0544 - 25 0826         Intrapartum & Postpartum Delivery Admission    Total Surgical QBL Blood Loss (mL) Hospital Encounter 356 mL 356 mL    Total  356 mL 356 mL               End of Mother's Information  Mother: Yary Muñoz #[id number]                Delivery - Provider to Complete ([id number])    Delivering clinician: Neris Nur DO  Delivery Type (Choose the 1 that will go to the Birth History): , Low Transverse                          Priority: Scheduled      Specifics: Repeat     Indications for : Planned repeat     Other personnel:  Provider Role   Yakelin Grimes RN Registered Nurse                    Placenta    Date/Time: 2025  7:48 AM  Removal: Spontaneous  Disposition: Hospital disposal             Anesthesia    Method: Spinal                    Presentation and Position    Presentation: Vertex    Position: Left Occiput Anterior         "             Neris Nur, DO

## 2025-04-24 NOTE — ANESTHESIA CARE TRANSFER NOTE
Patient: Yary Muñoz    Procedure: Procedure(s):   SECTION       Diagnosis: History of  section [Z98.891]  Diagnosis Additional Information: No value filed.    Anesthesia Type:   Spinal     Note:    Oropharynx: oropharynx clear of all foreign objects and spontaneously breathing  Level of Consciousness: awake  Oxygen Supplementation: room air    Independent Airway: airway patency satisfactory and stable  Dentition: dentition unchanged  Vital Signs Stable: post-procedure vital signs reviewed and stable  Report to RN Given: handoff report given  Patient transferred to: Labor and Delivery    Handoff Report: Identifed the Patient, Identified the Reponsible Provider, Reviewed the pertinent medical history, Discussed the surgical course, Reviewed Intra-OP anesthesia mangement and issues during anesthesia, Set expectations for post-procedure period and Allowed opportunity for questions and acknowledgement of understanding      Vitals:  Vitals Value Taken Time   BP 99/52 25 0827   Temp     Pulse     Resp     SpO2 94 % 25 0827   Vitals shown include unfiled device data.    Electronically Signed By: Dean Dennis Severson, APRN CRNA  2025  8:28 AM   Imiquimod Counseling:  I discussed with the patient the risks of imiquimod including but not limited to erythema, scaling, itching, weeping, crusting, and pain.  Patient understands that the inflammatory response to imiquimod is variable from person to person and was educated regarded proper titration schedule.  If flu-like symptoms develop, patient knows to discontinue the medication and contact us.

## 2025-04-24 NOTE — ANESTHESIA PROCEDURE NOTES
"Intrathecal injection Procedure Note    Pre-Procedure   Staff -        Anesthesiologist:  Britany Rajan MD       Performed By: anesthesiologist       Location: OR       Procedure Start/Stop Times: 4/24/2025 7:26 AM and 4/24/2025 7:29 AM       Pre-Anesthestic Checklist: patient identified, IV checked, risks and benefits discussed, informed consent, monitors and equipment checked, pre-op evaluation and at physician/surgeon's request  Timeout:       Correct Patient: Yes        Correct Procedure: Yes        Correct Site: Yes        Correct Position: Yes   Procedure Documentation  Procedure: intrathecal injection         Patient Position: sitting       Skin prep: Chloraprep       Insertion Site: L3-4. (midline approach).       Needle Gauge: 24.        Needle Length (Inches): 4        Spinal Needle Type: Pencan       Introducer used       Introducer: 20 G       # of attempts: 1 and  # of redirects:  0    Assessment/Narrative         Paresthesias: No.       Sensory Level: T6       CSF fluid: clear.       Opening pressure was cmH2O while  Sitting.      Medication(s) Administered   0.75% Hyperbaric Bupivacaine (Intrathecal) - Intrathecal   1.6 mL - 4/24/2025 7:26:00 AM  Fentanyl PF (Intrathecal) - Intrathecal   15 mcg - 4/24/2025 7:26:00 AM  Morphine PF 1 mg/mL (Intrathecal) - Intrathecal   0.2 mg - 4/24/2025 7:26:00 AM  Medication Administration Time: 4/24/2025 7:26 AM      FOR [facility] ([facility]) ONLY:   Pain Team Contact information: please page the Pain Team Via [email or web address]. Search \"Pain\". During daytime hours, please page the attending first. At night please page the resident first.      "

## 2025-04-24 NOTE — ANESTHESIA POSTPROCEDURE EVALUATION
Patient: Yary Muñoz    Procedure: Procedure(s):   SECTION       Anesthesia Type:  Spinal    Note:  Disposition: Inpatient   Postop Pain Control: Uneventful            Sign Out: Well controlled pain   PONV: No   Neuro/Psych: Uneventful            Sign Out: Acceptable/Baseline neuro status   Airway/Respiratory: Uneventful            Sign Out: Acceptable/Baseline resp. status   CV/Hemodynamics: Uneventful            Sign Out: Acceptable CV status; No obvious hypovolemia; No obvious fluid overload   Other NRE:    DID A NON-ROUTINE EVENT OCCUR? No           Last vitals:  Vitals Value Taken Time   /61 25 0914   Temp 97.5  F (36.4  C) 25 0830   Pulse     Resp 18 25 0830   SpO2 96 % 25 0922   Vitals shown include unfiled device data.    Electronically Signed By: Britany Rajan MD  2025  9:26 AM

## 2025-04-24 NOTE — PLAN OF CARE
"Goal Outcome Evaluation:      Plan of Care Reviewed With: patient    Overall Patient Progress: improvingOverall Patient Progress: improving       VSS, Bonding well with baby. Pain is well controlled with tylenol and Toradol. Bell catheter removed due to void and ambulating with a stand by assist. Tolerating regular diet well. Independent in self and baby cares. Breast feeding is progressing well.       Problem: Adult Inpatient Plan of Care  Goal: Plan of Care Review  Description: The Plan of Care Review/Shift note should be completed every shift.  The Outcome Evaluation is a brief statement about your assessment that the patient is improving, declining, or no change.  This information will be displayed automatically on your shiftnote.  Outcome: Progressing  Flowsheets (Taken 2025 1752)  Plan of Care Reviewed With: patient  Overall Patient Progress: improving  Goal: Patient-Specific Goal (Individualized)  Description: You can add care plan individualizations to a care plan. Examples of Individualization might be:  \"Parent requests to be called daily at 9am for status\", \"I have a hard time hearing out of my right ear\", or \"Do not touch me to wake me up as it startlesme\".  Outcome: Progressing  Goal: Absence of Hospital-Acquired Illness or Injury  Outcome: Progressing  Goal: Optimal Comfort and Wellbeing  Outcome: Progressing  Intervention: Provide Person-Centered Care  Recent Flowsheet Documentation  Taken 2025 1045 by Louise Telles RN  Trust Relationship/Rapport:   care explained   choices provided   emotional support provided   empathic listening provided   questions answered   reassurance provided   questions encouraged   thoughts/feelings acknowledged  Goal: Readiness for Transition of Care  Outcome: Progressing     Problem:  Delivery  Goal: Bleeding is Controlled  Outcome: Progressing  Goal: Stable Fetal Wellbeing  Outcome: Progressing  Goal: Absence of Infection Signs and " Symptoms  Outcome: Progressing  Goal: Effective Oxygenation and Ventilation  Outcome: Progressing

## 2025-04-25 VITALS
SYSTOLIC BLOOD PRESSURE: 105 MMHG | OXYGEN SATURATION: 98 % | TEMPERATURE: 97.9 F | HEART RATE: 70 BPM | RESPIRATION RATE: 16 BRPM | DIASTOLIC BLOOD PRESSURE: 69 MMHG

## 2025-04-25 LAB — HGB BLD-MCNC: 10.5 G/DL (ref 11.7–15.7)

## 2025-04-25 PROCEDURE — 250N000011 HC RX IP 250 OP 636: Mod: JZ | Performed by: FAMILY MEDICINE

## 2025-04-25 PROCEDURE — 120N000001 HC R&B MED SURG/OB

## 2025-04-25 PROCEDURE — 36415 COLL VENOUS BLD VENIPUNCTURE: CPT | Performed by: FAMILY MEDICINE

## 2025-04-25 PROCEDURE — 250N000013 HC RX MED GY IP 250 OP 250 PS 637: Performed by: FAMILY MEDICINE

## 2025-04-25 PROCEDURE — 250N000013 HC RX MED GY IP 250 OP 250 PS 637: Performed by: STUDENT IN AN ORGANIZED HEALTH CARE EDUCATION/TRAINING PROGRAM

## 2025-04-25 PROCEDURE — 85018 HEMOGLOBIN: CPT | Performed by: FAMILY MEDICINE

## 2025-04-25 RX ORDER — LEVOTHYROXINE SODIUM 125 UG/1
125 TABLET ORAL
Status: DISCONTINUED | OUTPATIENT
Start: 2025-04-25 | End: 2025-04-26 | Stop reason: HOSPADM

## 2025-04-25 RX ADMIN — IBUPROFEN 800 MG: 800 TABLET, FILM COATED ORAL at 20:13

## 2025-04-25 RX ADMIN — ACETAMINOPHEN 975 MG: 325 TABLET, FILM COATED ORAL at 01:26

## 2025-04-25 RX ADMIN — ACETAMINOPHEN 975 MG: 325 TABLET, FILM COATED ORAL at 07:35

## 2025-04-25 RX ADMIN — SENNOSIDES AND DOCUSATE SODIUM 1 TABLET: 50; 8.6 TABLET ORAL at 08:58

## 2025-04-25 RX ADMIN — LEVOTHYROXINE SODIUM 125 MCG: 0.12 TABLET ORAL at 18:31

## 2025-04-25 RX ADMIN — SENNOSIDES AND DOCUSATE SODIUM 1 TABLET: 50; 8.6 TABLET ORAL at 20:13

## 2025-04-25 RX ADMIN — ACETAMINOPHEN 975 MG: 325 TABLET, FILM COATED ORAL at 13:59

## 2025-04-25 RX ADMIN — KETOROLAC TROMETHAMINE 15 MG: 15 INJECTION, SOLUTION INTRAMUSCULAR; INTRAVENOUS at 01:59

## 2025-04-25 RX ADMIN — IBUPROFEN 800 MG: 800 TABLET, FILM COATED ORAL at 08:58

## 2025-04-25 RX ADMIN — ACETAMINOPHEN 975 MG: 325 TABLET, FILM COATED ORAL at 22:17

## 2025-04-25 RX ADMIN — IBUPROFEN 800 MG: 800 TABLET, FILM COATED ORAL at 13:59

## 2025-04-25 ASSESSMENT — ACTIVITIES OF DAILY LIVING (ADL)
ADLS_ACUITY_SCORE: 16
ADLS_ACUITY_SCORE: 28
ADLS_ACUITY_SCORE: 28
ADLS_ACUITY_SCORE: 16
ADLS_ACUITY_SCORE: 28
ADLS_ACUITY_SCORE: 30
ADLS_ACUITY_SCORE: 28
ADLS_ACUITY_SCORE: 16
ADLS_ACUITY_SCORE: 30
ADLS_ACUITY_SCORE: 30
ADLS_ACUITY_SCORE: 28
ADLS_ACUITY_SCORE: 30
ADLS_ACUITY_SCORE: 16
ADLS_ACUITY_SCORE: 16
ADLS_ACUITY_SCORE: 30
ADLS_ACUITY_SCORE: 16
ADLS_ACUITY_SCORE: 28
ADLS_ACUITY_SCORE: 30
ADLS_ACUITY_SCORE: 16
ADLS_ACUITY_SCORE: 28
ADLS_ACUITY_SCORE: 28

## 2025-04-25 NOTE — PROGRESS NOTES
[facility]   Post-partum Progress Note    Name:  Yary Muñoz  MRN: [id number]    S:   Patient reports feeling .  Pain  controlled.  Tolerating regular diet without nausea or vomiting.  Ambulating without dizziness.  Bell still in place due POUR but ready for removal.  Has  passed flatus; has not had bowel movement. Lochia similar to menstrual flow.     O:   Patient Vitals for the past 24 hrs:   BP Temp Temp src Pulse Resp SpO2   25 0858 101/74 98  F (36.7  C) Oral 70 18 --   25 0526 107/64 97.9  F (36.6  C) Oral 71 16 --   25 0127 117/82 97.3  F (36.3  C) Oral 75 16 --   25 2135 105/69 97.9  F (36.6  C) Oral 70 16 98 %   25 1830 102/65 98.1  F (36.7  C) Oral -- 16 99 %   25 1415 101/58 98.2  F (36.8  C) Oral 60 16 99 %     Gen:  Resting comfortably, NAD  CV:  Well perfused  Pulm:  Non-labored breathing. No cough or audible wheezing.   Abd:  Soft, appropriately tender to palpation, non-distended.  Fundus below umbilicus, firm, and appropriately tender.  Inc:  Bandage in place, c/d/i with minimal overlying shadowing, no surrounding erythema or edema  Ext:  Non-tender, trace LE edema b/l    Hgb:   Recent Labs   Lab 25  0745 25  0607   HGB 10.5* 12.0     A/P:  34 year old  POD#1 s/p 4-peat RLTCS. Clinically stable. Meeting postop goals other than voiding. Continue with routine postpartum management.     #Routine Postpartum  Pain: Well-controlled with scheduled tylenol, toradol > ibuprofen, PRN oxy  Hgb: 12 > 10.5. VSS as noted above, asymptomatic. Will discharge with PO Fe if hgb < 10  GI:  Regular diet. Bowel regimen. Encourage hydration and ambulation.  PPx:  Encouraged ambulation  Rh: Positive  Baby: In room, doing well  Dispo: Plan for home on POD#2-3.    #POUR - Bell to be removed to trial void  #Hypothyroid - Restarted Synthroid 125, prepregnancy dose      Bobo Covington MD MPH  [facility]  2025 2:08 PM

## 2025-04-25 NOTE — PLAN OF CARE
Vital signs stable. Postpartum assessment WDL. Uterine fundus is firm and midline. Scant vaginal bleeding. Using Tylenol and Toradol for pain with good relief. Incision assessment WDL. Up and ambulating; free of dizziness. Straight cathed x2 overnight. Still unable to adequately void, borges catheter placed at 0645. Tolerating a regular diet. Breastfeeding well every 2-3 hours. Questions/concerns addressed.      Problem: Adult Inpatient Plan of Care  Goal: Plan of Care Review  Description: The Plan of Care Review/Shift note should be completed every shift.  The Outcome Evaluation is a brief statement about your assessment that the patient is improving, declining, or no change.  This information will be displayed automatically on your shiftnote.  Outcome: Progressing  Flowsheets (Taken 202517)  Plan of Care Reviewed With: patient  Overall Patient Progress: improving  Goal: Absence of Hospital-Acquired Illness or Injury  Outcome: Progressing  Intervention: Prevent Skin Injury  Recent Flowsheet Documentation  Taken 2025 by Alicia Martin RN  Body Position: position changed independently  Taken 2025 by Alicia Martin RN  Body Position: position changed independently  Goal: Optimal Comfort and Wellbeing  Outcome: Progressing  Intervention: Provide Person-Centered Care  Recent Flowsheet Documentation  Taken 2025 by Alicia Martin RN  Trust Relationship/Rapport:   care explained   choices provided   questions answered   questions encouraged   thoughts/feelings acknowledged  Taken 2025 by Alicia Martin, ENDY  Trust Relationship/Rapport:   care explained   choices provided   questions answered   questions encouraged   thoughts/feelings acknowledged  Goal: Readiness for Transition of Care  Outcome: Progressing     Problem:  Delivery  Goal: Bleeding is Controlled  Outcome: Progressing  Goal: Stable Fetal Wellbeing  Outcome: Progressing  Intervention: Promote and  Monitor Fetal Wellbeing  Recent Flowsheet Documentation  Taken 2025 by Alicia Martin RN  Body Position: position changed independently  Taken 2025 by Alicia Martin RN  Body Position: position changed independently  Goal: Absence of Infection Signs and Symptoms  Outcome: Progressing  Goal: Effective Oxygenation and Ventilation  Outcome: Progressing     Problem: Postpartum ( Delivery)  Goal: Successful Parent Role Transition  Outcome: Progressing  Intervention: Support Parent Role Transition  Recent Flowsheet Documentation  Taken 2025 by Alicia Martin RN  Supportive Measures:   active listening utilized   decision-making supported  Goal: Hemostasis  Outcome: Progressing  Goal: Effective Bowel Elimination  Outcome: Progressing  Goal: Fluid and Electrolyte Balance  Outcome: Progressing  Goal: Absence of Infection Signs and Symptoms  Outcome: Progressing  Goal: Anesthesia/Sedation Recovery  Outcome: Progressing  Goal: Optimal Pain Control and Function  Outcome: Progressing  Goal: Nausea and Vomiting Relief  Outcome: Progressing  Goal: Effective Urinary Elimination  Outcome: Progressing  Goal: Effective Oxygenation and Ventilation  Outcome: Progressing  Intervention: Optimize Oxygenation and Ventilation  Recent Flowsheet Documentation  Taken 2025 by Alicia Martin RN  Head of Bed (HOB) Positioning: HOB at 45 degrees  Taken 2025 by Alicia Martin RN  Head of Bed (HOB) Positioning: HOB at 45 degrees   Goal Outcome Evaluation:      Plan of Care Reviewed With: patient    Overall Patient Progress: improvingOverall Patient Progress: improving

## 2025-04-25 NOTE — PLAN OF CARE
Goal Outcome Evaluation:      Plan of Care Reviewed With: patient, spouse    Overall Patient Progress: improvingOverall Patient Progress: improving    Outcome Evaluation: Vitals stable. Up independent in room. Levothroxine re-started today. HGB 10.5. IV access discontinued. Borges removed at 1800, due to void    Data: Vital signs within normal limits. Postpartum checks within normal limits - see flow record. Patient eating and drinking normally. Patient able to ambulate in room independently . Wants to shower 4/26 am. Borges removed at 1800, lots of urine output. Due to void by 2200. No apparent signs of infection. Incision still covered. Patient wants to remove dressing after shower. Patient performing self cares and is able to care for infant.  Action: Patient medicated during the shift for pain and cramping. See MAR. Patient reassessed within 1 hour after each medication and pain was improved - patient stated she was comfortable. Patient education done about medication times, broges removal, breastfeeding assistance when needed. See flow record.  Response: Positive attachment behaviors observed with infant.  at bedside who has been helpful.   Plan: Anticipate discharge on 4/27      Problem: Adult Inpatient Plan of Care  Goal: Plan of Care Review  Description: The Plan of Care Review/Shift note should be completed every shift.  The Outcome Evaluation is a brief statement about your assessment that the patient is improving, declining, or no change.  This information will be displayed automatically on your shiftnote.  Outcome: Progressing  Flowsheets (Taken 4/25/2025 1800)  Outcome Evaluation: Vitals stable. Up independent in room. Levothroxine re-started today. HGB 10.5. IV access discontinued. Borges removed at 1800, due to void  Plan of Care Reviewed With:   patient   spouse  Overall Patient Progress: improving  Goal: Patient-Specific Goal (Individualized)  Description: You can add care plan individualizations  "to a care plan. Examples of Individualization might be:  \"Parent requests to be called daily at 9am for status\", \"I have a hard time hearing out of my right ear\", or \"Do not touch me to wake me up as it startlesme\".  Outcome: Progressing  Goal: Absence of Hospital-Acquired Illness or Injury  Outcome: Progressing  Intervention: Prevent Skin Injury  Recent Flowsheet Documentation  Taken 4/25/2025 0900 by Shanon Melchor RN  Body Position: position changed independently  Taken 4/25/2025 0858 by Shanon Melchor RN  Body Position: position changed independently  Intervention: Prevent and Manage VTE (Venous Thromboembolism) Risk  Recent Flowsheet Documentation  Taken 4/25/2025 0900 by Shanon Melchor RN  VTE Prevention/Management: SCDs off (sequential compression devices)  Goal: Optimal Comfort and Wellbeing  Outcome: Progressing  Intervention: Monitor Pain and Promote Comfort  Recent Flowsheet Documentation  Taken 4/25/2025 1359 by Shanon Melchor RN  Pain Management Interventions: medication (see MAR)  Taken 4/25/2025 0858 by Shanon Melchor RN  Pain Management Interventions:   medication (see MAR)   cold applied  Intervention: Provide Person-Centered Care  Recent Flowsheet Documentation  Taken 4/25/2025 0900 by Shanon Melchor RN  Trust Relationship/Rapport:   care explained   choices provided   questions answered   questions encouraged   thoughts/feelings acknowledged  Goal: Readiness for Transition of Care  Outcome: Progressing     "

## 2025-04-25 NOTE — PROVIDER NOTIFICATION
04/25/25 0638   Provider Notification   Provider Name/Title Dr LESLYE Velasquez   Method of Notification Phone   Notification Reason Status Update     MD updated that patient had been straight cathed twice overnight. Pt was able to void a small amount, but uterus is to the right of midline and bladder scanned for 300 mL. VORB to place a borges catheter and to leave until the evening.

## 2025-04-26 VITALS
DIASTOLIC BLOOD PRESSURE: 74 MMHG | OXYGEN SATURATION: 98 % | RESPIRATION RATE: 18 BRPM | HEART RATE: 72 BPM | TEMPERATURE: 97.7 F | SYSTOLIC BLOOD PRESSURE: 100 MMHG

## 2025-04-26 PROCEDURE — 250N000013 HC RX MED GY IP 250 OP 250 PS 637: Performed by: STUDENT IN AN ORGANIZED HEALTH CARE EDUCATION/TRAINING PROGRAM

## 2025-04-26 PROCEDURE — 250N000013 HC RX MED GY IP 250 OP 250 PS 637: Performed by: FAMILY MEDICINE

## 2025-04-26 RX ADMIN — IBUPROFEN 800 MG: 800 TABLET, FILM COATED ORAL at 13:16

## 2025-04-26 RX ADMIN — ACETAMINOPHEN 975 MG: 325 TABLET, FILM COATED ORAL at 13:16

## 2025-04-26 RX ADMIN — SENNOSIDES AND DOCUSATE SODIUM 1 TABLET: 50; 8.6 TABLET ORAL at 08:40

## 2025-04-26 RX ADMIN — IBUPROFEN 800 MG: 800 TABLET, FILM COATED ORAL at 08:41

## 2025-04-26 RX ADMIN — LEVOTHYROXINE SODIUM 125 MCG: 0.12 TABLET ORAL at 08:40

## 2025-04-26 RX ADMIN — ACETAMINOPHEN 975 MG: 325 TABLET, FILM COATED ORAL at 04:58

## 2025-04-26 RX ADMIN — IBUPROFEN 800 MG: 800 TABLET, FILM COATED ORAL at 02:26

## 2025-04-26 ASSESSMENT — ACTIVITIES OF DAILY LIVING (ADL)
ADLS_ACUITY_SCORE: 30
ADLS_ACUITY_SCORE: 30
ADLS_ACUITY_SCORE: 32
ADLS_ACUITY_SCORE: 30
ADLS_ACUITY_SCORE: 30
ADLS_ACUITY_SCORE: 32
ADLS_ACUITY_SCORE: 30
ADLS_ACUITY_SCORE: 32
DEPENDENT_IADLS:: INDEPENDENT
ADLS_ACUITY_SCORE: 30
ADLS_ACUITY_SCORE: 32
ADLS_ACUITY_SCORE: 30

## 2025-04-26 NOTE — PLAN OF CARE
"Pt VSS. Postpartum checks WDL. C/s incision is CDI, no signs of infection. Is bonding well with infant. Tolerating regular diet and able to ambulate independently. Urine output adequate, voids without difficulty. Pt utilizing tylenol and ibuprofen for pain management. Breastfeeding infant every 2-3 hours.    Problem: Adult Inpatient Plan of Care  Goal: Plan of Care Review  Description: The Plan of Care Review/Shift note should be completed every shift.  The Outcome Evaluation is a brief statement about your assessment that the patient is improving, declining, or no change.  This information will be displayed automatically on your shiftnote.  Outcome: Progressing  Flowsheets (Taken 2025)  Plan of Care Reviewed With: patient  Overall Patient Progress: improving  Goal: Patient-Specific Goal (Individualized)  Description: You can add care plan individualizations to a care plan. Examples of Individualization might be:  \"Parent requests to be called daily at 9am for status\", \"I have a hard time hearing out of my right ear\", or \"Do not touch me to wake me up as it startlesme\".  Outcome: Progressing  Goal: Absence of Hospital-Acquired Illness or Injury  Outcome: Progressing  Intervention: Prevent Skin Injury  Recent Flowsheet Documentation  Taken 2025 by Suzy Schwartz, RN  Body Position: position changed independently  Goal: Optimal Comfort and Wellbeing  Outcome: Progressing  Intervention: Provide Person-Centered Care  Recent Flowsheet Documentation  Taken 2025 by Suzy Schwartz RN  Trust Relationship/Rapport:   care explained   choices provided   questions answered   questions encouraged   thoughts/feelings acknowledged  Goal: Readiness for Transition of Care  Outcome: Progressing     Problem:  Delivery  Goal: Bleeding is Controlled  Outcome: Progressing  Goal: Stable Fetal Wellbeing  Outcome: Progressing  Intervention: Promote and Monitor Fetal Wellbeing  Recent Flowsheet " Documentation  Taken 2025 by Suzy Schwartz RN  Body Position: position changed independently  Goal: Absence of Infection Signs and Symptoms  Outcome: Progressing     Problem: Postpartum ( Delivery)  Goal: Successful Parent Role Transition  Outcome: Progressing  Goal: Hemostasis  Outcome: Progressing  Goal: Effective Bowel Elimination  Outcome: Progressing  Goal: Fluid and Electrolyte Balance  Outcome: Progressing  Goal: Absence of Infection Signs and Symptoms  Outcome: Progressing  Goal: Optimal Pain Control and Function  Outcome: Progressing  Goal: Effective Urinary Elimination  Outcome: Progressing

## 2025-04-26 NOTE — DISCHARGE INSTRUCTIONS
Warning Signs after Having a Baby    Keep this paper on your fridge or somewhere else where you can see it.    Call your provider if you have any of these symptoms up to 12 weeks after having your baby.    Thoughts of hurting yourself or your baby  Pain in your chest or trouble breathing  Severe headache not helped by pain medicine  Eyesight concerns (blurry vision, seeing spots or flashes of light, other changes to eyesight)  Fainting, shaking or other signs of a seizure    Call 9-1-1 if you feel that it is an emergency.     The symptoms below can happen to anyone after giving birth. They can be very serious. Call your provider if you have any of these warning signs.    My provider s phone number: _______________________    Losing too much blood (hemorrhage)    Call your provider if you soak through a pad in less than an hour or pass blood clots bigger than a golf ball. These may be signs that you are bleeding too much.    Blood clots in the legs or lungs    After you give birth, your body naturally clots its blood to help prevent blood loss. Sometimes this increased clotting can happen in other areas of the body, like the legs or lungs. This can block your blood flow and be very dangerous.     Call your provider if you:  Have a red, swollen spot on the back of your leg that is warm or painful when you touch it.   Are coughing up blood.     Infection    Call your provider if you have any of these symptoms:  Fever of 100.4 F (38 C) or higher.  Pain or redness around your stitches if you had an incision.   Any yellow, white, or green fluid coming from places where you had stitches or surgery.    Mood Problems (postpartum depression)    Many people feel sad or have mood changes after having a baby. But for some people, these mood swings are worse.     Call your provider right away if you feel so anxious or nervous that you can't care for yourself or your baby.    Preeclampsia (high blood pressure)    Even if you  didn't have high blood pressure when you were pregnant, you are at risk for the high blood pressure disease called preeclampsia. This risk can last up to 12 weeks after giving birth.     Call your provider if you have:   Pain on your right side under your rib cage  Sudden swelling in the hands and face    Remember: You know your body. If something doesn't feel right, get medical help.     For informational purposes only. Not to replace the advice of your health care provider. Copyright 2020 [facility]. All rights reserved. Clinically reviewed by Henrietta Swift, RNC-OB, MSN. [employer] 600598 - Rev 02/23.    Breast Engorgement: Care Instructions  Your Care Instructions    Breast engorgement is the painful overfilling of the breasts that can occur during breastfeeding (sometimes called chestfeeding). It usually occurs when your breasts make more milk than your baby can drink or when you are unable to breastfeed or pump. It also happens when you stop breastfeeding your baby.  Breast engorgement can make it hard for your baby to latch on to your nipple. Your baby may then be unable to breastfeed. This makes the problem worse.  If you breastfeed or pump, engorgement should get better in a few days. If you've stopped breastfeeding, it can take longer. Over time, your body will stop making milk. This can take up to several weeks.  Follow-up care is a key part of your treatment and safety. Be sure to make and go to all appointments, and call your doctor if you are having problems. It's also a good idea to know your test results and keep a list of the medicines you take.  How can you care for yourself?  Try taking ibuprofen (Advil, Motrin) to reduce pain and swelling. Or you can take acetaminophen (Tylenol) to help with discomfort. Be safe with medicines. Read and follow all instructions on the label.  Do not take two or more pain medicines at the same time unless the doctor told you to. Many pain medicines  "have acetaminophen, which is Tylenol. Too much acetaminophen (Tylenol) can be harmful.  If your breasts hurt and feel too full, try hand expressing a small amount of milk just until they feel comfortable. But don't try to empty your breasts all the way. Releasing a lot of milk will cause your body to produce larger amounts of milk. This can make breast engorgement worse.  If you are breastfeeding, continue to regularly breastfeed when your baby is hungry.  Change your breastfeeding positions to help remove milk from all areas of your breast.  If your baby is having trouble latching, try hand expressing a small amount of milk before feeding. This can help soften your breasts to make it easier for your baby to latch.  If you have any problems with your baby's latch or breastfeeding, talk to your doctor or midwife or a lactation consultant.  Try using a cold compress for pain and swelling.Put ice or a cold pack on the area for 10 to 20 minutes at a time. Put a thin cloth between the ice and your skin.  Wear a supportive bra that fits.  Avoid massaging your breasts. This can cause tissue injury and inflammation.  When should you call for help?    Call your doctor or midwife now or seek immediate medical care if:  You have symptoms of a breast inflammation or infection, such as:  Increased pain, swelling, warmth, redness, or a color change on your breast.  Red streaks extending from the breast.  Pus draining from a breast.  A fever.  Watch closely for changes in your health, and be sure to contact your doctor or midwife if:  You do not get better as expected.  Where can you learn more?  Go to https://www.[employer].net/patiented  Enter Z048 in the search box to learn more about \"Breast Engorgement: Care Instructions.\"  Current as of: April 30, 2024  Content Version: 14.4    2176-0607 [employer].   Care instructions adapted under license by your healthcare professional. If you have questions about a medical " condition or this instruction, always ask your healthcare professional. [employer] disclaims any warranty or liability for your use of this information.  Learning About Storing Breast Milk  Breast milk can be released (expressed) by hand or with a pump. Then the milk can be stored to feed your baby later. Breast milk can be stored at room temperature, in the fridge, or in the freezer for certain amounts of time. Storing breast milk properly can help keep it safe to feed to your baby.  Why store breast milk?  Storing breast milk lets you feed your baby later or allows someone else to do it. This can help if you're going back to work or will be gone during a feeding. It's also a way to give your baby breast milk if they can't breastfeed.    Choose safe containers for storage.  You can use:  Plastic baby bottle liners.  Breast milk storage bags.  Glass or plastic bottles or food containers with lids.     Prepare your containers.    Wash any bottles in hot, soapy water.  Use the , if you can.  Make sure bottles are clean and dry.     Fill your containers safely.    Wash your hands before handling milk that will be stored.  Use a container that holds only enough milk for one feeding.  You can combine the milk from both breasts collected during the same session in one container.  Allow some space at the top of the bag or bottle. The milk will expand when it freezes.  Write the date the milk was collected on the outside of the bag or bottle.     Store milk in the fridge or freezer for later.    Keep the milk at the back of the fridge or freezer. This will give the best temperature control.     Thaw frozen milk carefully.    To thaw frozen milk, you can:  Run warm water over the container until the milk becomes slushy.  Thaw it in the fridge overnight.     Warm the milk for your baby--if you want.    Warm the milk in lukewarm water (no warmer than 104 F (40 C)) for 20 minutes.  Don't use a microwave.  "It creates hot spots in the milk. This can burn your baby's mouth and throat.     Swirl the container to mix the milk.    Breast milk will separate as the fat floats to the top. Separation is normal.   Storage tips  If you have breast milk left after a feeding, use it within 2 hours after your baby has finished.    Freshly pumped or hand-expressed breast milk can be stored:  At room temperature (77 F (25 C) or colder) for 4 hours.  In the fridge (40 F (4 C)) for 4 days.  In the freezer (0 F (-18 C) or colder) for 6 to 12 months.   Thawed breast milk that was previously frozen can be stored:  At room temperature (77 F (25 C) or colder) for 1 to 2 hours.  In the fridge (40 F (4 C)) for 1 day (24 hours).  Don't refreeze thawed breast milk.   Where can you learn more?  Go to https://www.[employer].net/patiented  Enter S090 in the search box to learn more about \"Learning About Storing Breast Milk.\"  Current as of: 2024  Content Version: 14.4    3936-6580 [employer].   Care instructions adapted under license by your healthcare professional. If you have questions about a medical condition or this instruction, always ask your healthcare professional. [employer] disclaims any warranty or liability for your use of this information.   Section: What to Expect at Home  Your Recovery     A  section, or , is surgery to deliver your baby through a cut that the doctor makes in your lower belly and uterus. The cut is called an incision.  You may have some pain in your lower belly and need pain medicine for 1 to 2 weeks. You can expect some vaginal bleeding for several weeks. You will probably need about 6 weeks to fully recover.  It's important to take it easy while the incision heals. Avoid heavy lifting, strenuous activities, and exercises that strain the belly muscles while you recover. Ask a family member or friend for help with housework, cooking, and shopping.  This " care sheet gives you a general idea about how long it will take for you to recover. But each person recovers at a different pace. Follow the steps below to get better as quickly as possible.  How can you care for yourself at home?  Activity       Rest when you feel tired. Getting enough sleep will help you recover.        Try to walk each day. Start by walking a little more than you did the day before. Bit by bit, increase the amount you walk. Walking boosts blood flow and helps prevent pneumonia, constipation, and blood clots.        Avoid strenuous activities, such as bicycle riding, jogging, weightlifting, and aerobic exercise, for 6 weeks or until your doctor says it is okay.        Until your doctor says it is okay, do not lift anything heavier than your baby.        Do not do sit-ups or other exercises that strain the belly muscles for 6 weeks or until your doctor says it is okay.        Hold a pillow over your incision when you cough or take deep breaths. This will support your belly and decrease your pain.        You may shower as usual. Pat the incision dry when you are done.        You will have some vaginal bleeding. Wear sanitary pads. Do not douche or use tampons until your doctor says it is okay.        Ask your doctor when you can drive again.        You will probably need to take at least 6 weeks off work. It depends on the type of work you do and how you feel.        Ask your doctor when it is okay for you to have sex.   Diet       You can eat your normal diet. If your stomach is upset, try bland, low-fat foods like plain rice, broiled chicken, toast, and yogurt.        Drink plenty of fluids (unless your doctor tells you not to).        You may notice that your bowel movements are not regular right after your surgery. This is common. Try to avoid constipation and straining with bowel movements. You may want to take a fiber supplement every day. If you have not had a bowel movement after a couple of  days, ask your doctor about taking a mild laxative.        If you are breastfeeding, limit alcohol. Alcohol can cause a lack of energy and other health problems for the baby when a breastfeeding woman drinks heavily. It can also get in the way of a mom's ability to feed her baby or to care for the child in other ways. There isn't a lot of research about exactly how much alcohol can harm a baby. Having no alcohol is the safest choice for your baby. If you choose to have a drink now and then, have only one drink, and limit the number of occasions that you have a drink. Wait to breastfeed at least 2 hours after you have a drink to reduce the amount of alcohol the baby may get in the milk.   Medicines       Your doctor will tell you if and when you can restart your medicines. You will also get instructions about taking any new medicines.        If you stopped taking aspirin or some other blood thinner, your doctor will tell you when to start taking it again.        Take pain medicines exactly as directed.  If the doctor gave you a prescription medicine for pain, take it as prescribed.  If you are not taking a prescription pain medicine, ask your doctor if you can take an over-the-counter medicine.        If you think your pain medicine is making you sick to your stomach:  Take your medicine after meals (unless your doctor has told you not to).  Ask your doctor for a different pain medicine.        If your doctor prescribed antibiotics, take them as directed. Do not stop taking them just because you feel better. You need to take the full course of antibiotics.   Incision care       If you have strips of tape on the incision, leave the tape on for a week or until it falls off.        Wash the area daily with warm, soapy water, and pat it dry. Don't use hydrogen peroxide or alcohol, which can slow healing. You may cover the area with a gauze bandage if it weeps or rubs against clothing. Change the bandage every day.         Keep the area clean and dry.   Other instructions       If you breastfeed your baby, you may be more comfortable while you are healing if you don't rest your baby on your belly. Try tucking your baby under your arm, with your baby's body along the side you will be feeding on. Support your baby's upper body with your arm. With that hand you can control your baby's head to bring your baby's mouth to your breast. This is sometimes called the football hold.   Follow-up care is a key part of your treatment and safety. Be sure to make and go to all appointments, and call your doctor if you are having problems. It's also a good idea to know your test results and keep a list of the medicines you take.  When should you call for help?  Share this information with your partner, family, or a friend. They can help you watch for warning signs.  Call 911  anytime you think you may need emergency care. For example, call if:       You feel you cannot stop from hurting yourself, your baby, or someone else.        You passed out (lost consciousness).        You have chest pain, are short of breath, or cough up blood.        You have a seizure.   Where to get help 24 hours a day, 7 days a week   If you or someone you know talks about suicide, self-harm, a mental health crisis, a substance use crisis, or any other kind of emotional distress, get help right away. You can:       Call the Suicide and Crisis Lifeline at 988.        Call 4-258-577-TALK (1-785.462.7361).        Text HOME to 144538 to access the Crisis Text Line.   Consider saving these numbers in your phone.  Go to [employer].org for more information or to chat online.  Call your doctor or midwife now or seek immediate medical care if:       You have loose stitches, or your incision comes open.        You have signs of hemorrhage (too much bleeding), such as:  Heavy vaginal bleeding. This means that you are soaking through one or more pads in an hour. Or you pass blood  "clots bigger than an egg.  Feeling dizzy or lightheaded, or you feel like you may faint.  Feeling so tired or weak that you cannot do your usual activities.  A fast or irregular heartbeat.  New or worse belly pain.        You have symptoms of infection, such as:  Increased pain, swelling, warmth, or redness.  Red streaks leading from the incision.  Pus draining from the incision.  A fever.  Frequent or painful urination or blood in your urine.  Vaginal discharge that smells bad.  New or worse belly pain.        You have symptoms of a blood clot in your leg (called a deep vein thrombosis), such as:  Pain in the calf, back of the knee, thigh, or groin.  Swelling in the leg or groin.  A color change on the leg or groin. The skin may be reddish or purplish, depending on your usual skin color.        You have signs of preeclampsia, such as:  Sudden swelling of your face, hands, or feet.  New vision problems (such as dimness, blurring, or seeing spots).  A severe headache.        You have signs of heart failure, such as:  New or increased shortness of breath.  New or worse swelling in your legs, ankles, or feet.  Sudden weight gain, such as more than 2 to 3 pounds in a day or 5 pounds in a week.  Feeling so tired or weak that you cannot do your usual activities.        You had spinal or epidural pain relief and have:  New or worse back pain.  Increased pain, swelling, warmth, or redness at the injection site.  Tingling, weakness, or numbness in your legs or groin.   Watch closely for changes in your health, and be sure to contact your doctor or midwife if:       Your vaginal bleeding isn't decreasing.        You feel sad, anxious, or hopeless for more than a few days.        You are having problems with your breasts or breastfeeding.   Where can you learn more?  Go to https://www.[employer].net/patiented  Enter M806 in the search box to learn more about \" Section: What to Expect at Home.\"  Current as of:  " "2024  Content Version: 14.4    [id number] [employer].   Care instructions adapted under license by your healthcare professional. If you have questions about a medical condition or this instruction, always ask your healthcare professional. [employer] disclaims any warranty or liability for your use of this information.  Depression After Childbirth: Care Instructions  It's common to lose sleep, feel irritable, and cry easily during the first few days after childbirth. Hormone changes and the demands of a new baby can cause these \"baby blues.\" If these mood changes last more than 2 weeks, you may have postpartum depression. This is a medical condition that requires treatment.  If you have any of these signs, you may be depressed. See your doctor right away.    You feel very sad or hopeless and lose interest in daily activities.       You sleep too much or not enough.       You feel tired or as if you have no energy.       You eat too much or too little.       You write or talk about death.     Tips to help with postpartum depression        What you can do   Try to go to all of your counseling sessions.  Take medicines as directed.  Eat healthy foods.  Get daily exercise, such as walks.  Try to get some sunlight every day.  Avoid using alcohol or other substances.  Get as much rest as possible.  Connect with friends, and join a support group for new parents.  When should you call for help?   Call 985 if:    You feel you cannot stop from hurting yourself, your baby, or someone else.   Where to get help 24 hours a day, 7 days a week   If you or someone you know talks about suicide, self-harm, a mental health crisis, a substance use crisis, or any other kind of emotional distress, get help right away. You can:    Call the Suicide and Crisis Lifeline at 494.     Call 5-193-834-TALK (1-443.893.4180).     Text HOME to 961854 to access the Crisis Text Line.   Consider saving these numbers in your " "phone.  Go to [employer] for more information or to chat online.  Call your doctor now or seek immediate medical care if:    You are having trouble caring for yourself or your baby.     You hear voices.   Contact your doctor if:    You have problems with your medicines.     You do not get better as expected.   Follow-up care is a key part of your treatment and safety. Be sure to make and go to all appointments, and call your doctor if you are having problems. It's also a good idea to know your test results and keep a list of the medicines you take.  Where can you learn more?  Go to https://www.[employer].net/patiented  Enter Y765 in the search box to learn more about \"Depression After Childbirth: Care Instructions.\"  Current as of: July 31, 2024  Content Version: 14.4    6331-3319 [employer].   Care instructions adapted under license by your healthcare professional. If you have questions about a medical condition or this instruction, always ask your healthcare professional. [employer] disclaims any warranty or liability for your use of this information.    "

## 2025-04-26 NOTE — PLAN OF CARE
Goal Outcome Evaluation:      Plan of Care Reviewed With: patient, spouse    Overall Patient Progress: improvingOverall Patient Progress: improving    Outcome Evaluation: Patient is ready to discharge home. Paperwork completed. NO IV. Voiding. Pain level acceptable to patient.    Data: Vital signs within normal limits. Postpartum checks within normal limits - see flow record. Patient eating and drinking normally. Patient able to empty bladder independently and is up ambulating. No apparent signs of infection. Incision healing well. Patient performing self cares and is able to care for infant.  Action: Patient medicated during the shift for pain and cramping. See MAR. Patient reassessed within 1 hour after each medication and pain was improved - patient stated she was comfortable. Using Tylenol and Ibuprofen for pain control. Patient education done about Pain medications, discharge instructions. See flow record.  Response: Positive attachment behaviors observed with infant.  at bedside who is attentive and helpful.   Plan: Anticipate discharge on 4/26/25      Patient's After Visit Summary was reviewed with patient and/or spouse.   Patient verbalized understanding of After Visit Summary, recommended follow up and was given an opportunity to ask questions.   Discharge medications sent home with patient/family: No   Discharged with spouse and son          Problem: Adult Inpatient Plan of Care  Goal: Plan of Care Review  Description: The Plan of Care Review/Shift note should be completed every shift.  The Outcome Evaluation is a brief statement about your assessment that the patient is improving, declining, or no change.  This information will be displayed automatically on your shiftnote.  Outcome: Met  Flowsheets (Taken 4/26/2025 1246)  Outcome Evaluation: Patient is ready to discharge home. Paperwork completed. NO IV. Voiding. Pain level acceptable to patient.  Plan of Care Reviewed With:   patient    "spouse  Overall Patient Progress: improving  Goal: Patient-Specific Goal (Individualized)  Description: You can add care plan individualizations to a care plan. Examples of Individualization might be:  \"Parent requests to be called daily at 9am for status\", \"I have a hard time hearing out of my right ear\", or \"Do not touch me to wake me up as it startlesme\".  Outcome: Met  Goal: Absence of Hospital-Acquired Illness or Injury  Outcome: Met  Intervention: Prevent Skin Injury  Recent Flowsheet Documentation  Taken 4/26/2025 0841 by Shanon Melchor RN  Body Position: position changed independently  Intervention: Prevent and Manage VTE (Venous Thromboembolism) Risk  Recent Flowsheet Documentation  Taken 4/26/2025 0841 by Shanon Melchor RN  VTE Prevention/Management: SCDs off (sequential compression devices)  Goal: Optimal Comfort and Wellbeing  Outcome: Met  Intervention: Monitor Pain and Promote Comfort  Recent Flowsheet Documentation  Taken 4/26/2025 0841 by Shanon Melchor, RN  Pain Management Interventions: medication (see MAR)  Intervention: Provide Person-Centered Care  Recent Flowsheet Documentation  Taken 4/26/2025 0841 by Shanon Melchor, ENDY  Trust Relationship/Rapport:   care explained   choices provided   questions answered   questions encouraged   thoughts/feelings acknowledged  Goal: Readiness for Transition of Care  Outcome: Met       "

## 2025-04-26 NOTE — DISCHARGE SUMMARY
"DISCHARGE SUMMARY    Yary Witt Toni  1990      Admission date:  2025  Discharge date: 2025    Admission diagnosis:   - Pregnancy at 39w3d  - Prior , desiring repeat    Discharge diagnosis:   - Status post RCS delivery    Reason for Admission:   This is a 34 year old  39w3d who presented to Labor and Delivery and was admitted for repeat  .  The patient's pregnancy had been uncomplicated.    Hospital Course:   The patient was admitted and underwent  delivery. Baby's hospital course was uncomplicated.  The patient's postpartum course was uncomplicated. The patient is breast feeding.  She is meeting discharge criteria and desires to be discharged home today, POD# 2        .    Discharge Exam:    Vitals:    25 0858 25 1600 25 2305 25 0841   BP: 101/74 110/68 104/72 100/74   BP Location:   Right arm    Patient Position:   Semi-Lackey's Semi-Lackey's   Cuff Size: Adult Regular Adult Regular Adult Regular Adult Regular   Pulse: 70 62 64 72   Resp: 18 18 16 18   Temp: 98  F (36.7  C) 98.4  F (36.9  C) 98  F (36.7  C) 97.7  F (36.5  C)   TempSrc: Oral Oral Oral Oral   SpO2:           Constitutional: healthy, alert, and no distress    Abdomen:  Uterine fundus is firm, non-tender and at the level of the umbilicus   Incision: C/D/I   LE:  no edema, non-tender      LABS:  Hemoglobin   Date Value Ref Range Status   2025 10.5 (L) 11.7 - 15.7 g/dL Final   2025 12.0 11.7 - 15.7 g/dL Final   2021 14.0 11.7 - 15.7 g/dL Final   2020 11.1 (L) 11.7 - 15.7 g/dL Final     No results found for: \"RUBELLAABIGG\"   Lab Results   Component Value Date    ABO A 2020           Lab Results   Component Value Date    RH Pos 2020                Discharge Medications:       Review of your medicines        CONTINUE these medicines which may have CHANGED, or have new prescriptions. If we are uncertain of the size of tablets/capsules you " have at home, strength may be listed as something that might have changed.        Dose / Directions   levothyroxine 150 MCG tablet  Commonly known as: SYNTHROID/LEVOTHROID  This may have changed: Another medication with the same name was removed. Continue taking this medication, and follow the directions you see here.  Used for: Hypothyroidism, unspecified type      Dose: 150 mcg  Take 1 tablet (150 mcg) by mouth daily.  Quantity: 90 tablet  Refills: 3            CONTINUE these medicines which have NOT CHANGED        Dose / Directions   prenatal multivitamin  plus iron 27-1 MG Tabs      Take by mouth daily.  Refills: 0     SENNA-docusate sodium 8.6-50 MG tablet  Commonly known as: SENNA S  Used for: Constipation during pregnancy in first trimester      Dose: 1 tablet  Take 1 tablet by mouth 2 times daily as needed (Constipaton).  Quantity: 60 tablet  Refills: 2            STOP taking      ondansetron 8 MG tablet  Commonly known as: ZOFRAN                 Patient Instructions:  Activity: the patient was instructed to have pelvic rest for 6 weeks.  Nothing in the vagina. No tampons, douching, or intercourse.  No driving while on narcotics.  If delivered by , no heavy lifting, pushing, or pulling greater than 15 lbs for 6 weeks. She should notify her physician with temperature greater than 100.4 degrees, and if she is having any brisk vaginal bleeding where she soaks through greater than 1 pad per hour for more than 2 hours, if any area on her breast becomes red or painful, or if her pain is no longer controlled by pain medications.  Diet as tolerated.  Discussed symptoms of post-partum blues and depression and urged her to contact us immediately should that become a concern.    If any history of hypertension in the hospital, please either check your blood pressure at home, or make an appointment for a follow up nurse blood pressure check in clinic within 5-7 days of discharge. If any severe headache, upper  abdominal pain, or significant visual changes with headache, please call the clinic.    Follow-up with primary OB in 6 weeks for a postpartum visit.    Lizeth Melchor MD  April 26, 2025

## 2025-04-28 NOTE — H&P
No significant change in general health status based on examination of the patient, review of Nursing Admission Database and prenatal record.  Dr. Neris Nur,     Obstetrics and Gynecology  [facility] and [place]

## 2025-04-30 ENCOUNTER — MEDICAL CORRESPONDENCE (OUTPATIENT)
Dept: HEALTH INFORMATION MANAGEMENT | Facility: CLINIC | Age: 35
End: 2025-04-30
Payer: COMMERCIAL

## 2025-05-20 ENCOUNTER — PATIENT OUTREACH (OUTPATIENT)
Dept: CARE COORDINATION | Facility: CLINIC | Age: 35
End: 2025-05-20
Payer: COMMERCIAL

## 2025-05-27 ENCOUNTER — TELEPHONE (OUTPATIENT)
Dept: OBGYN | Facility: CLINIC | Age: 35
End: 2025-05-27
Payer: COMMERCIAL

## 2025-05-27 NOTE — TELEPHONE ENCOUNTER
Left voicemail for patient. It looks like Dr Nur performed her  so she should go back to Dr Nur for her post op/postpartum. I offered a call day on  at 11:00 with a 10:45am arrival time.     Rd Wetzel [facility]

## 2025-05-27 NOTE — TELEPHONE ENCOUNTER
[facility] Call Center    Phone Message    May a detailed message be left on voicemail: yes     Reason for Call: Other: Appt needed     Patient needs a 6 week post partum scheduled (no birth control) and there is no availability. She prefers Dr. Nur but will see any provider.    Action Taken: Message routed to:  Other: [facility]    Travel Screening: Not Applicable     Date of Service:

## 2025-06-02 ENCOUNTER — PRENATAL OFFICE VISIT (OUTPATIENT)
Dept: OBGYN | Facility: CLINIC | Age: 35
End: 2025-06-02
Payer: COMMERCIAL

## 2025-06-02 VITALS — BODY MASS INDEX: 24 KG/M2 | DIASTOLIC BLOOD PRESSURE: 74 MMHG | WEIGHT: 127 LBS | SYSTOLIC BLOOD PRESSURE: 102 MMHG

## 2025-06-02 DIAGNOSIS — E06.3 HYPOTHYROIDISM DUE TO HASHIMOTO'S THYROIDITIS: Primary | ICD-10-CM

## 2025-06-02 LAB
T4 FREE SERPL-MCNC: 1.59 NG/DL (ref 0.9–1.7)
TSH SERPL DL<=0.005 MIU/L-ACNC: 0.28 UIU/ML (ref 0.3–4.2)

## 2025-06-02 PROCEDURE — 84439 ASSAY OF FREE THYROXINE: CPT | Performed by: FAMILY MEDICINE

## 2025-06-02 PROCEDURE — 84443 ASSAY THYROID STIM HORMONE: CPT | Performed by: FAMILY MEDICINE

## 2025-06-02 PROCEDURE — 99207 PR POST PARTUM EXAM: CPT | Performed by: FAMILY MEDICINE

## 2025-06-02 PROCEDURE — 36415 COLL VENOUS BLD VENIPUNCTURE: CPT | Performed by: FAMILY MEDICINE

## 2025-06-02 ASSESSMENT — EDINBURGH POSTNATAL DEPRESSION SCALE (EPDS)
I HAVE BEEN SO UNHAPPY THAT I HAVE HAD DIFFICULTY SLEEPING: NOT AT ALL
THINGS HAVE BEEN GETTING ON TOP OF ME: NO, I HAVE BEEN COPING AS WELL AS EVER
I HAVE BLAMED MYSELF UNNECESSARILY WHEN THINGS WENT WRONG: YES, SOME OF THE TIME
THE THOUGHT OF HARMING MYSELF HAS OCCURRED TO ME: NEVER
I HAVE FELT SAD OR MISERABLE: NO, NOT AT ALL
I HAVE BEEN ANXIOUS OR WORRIED FOR NO GOOD REASON: HARDLY EVER
I HAVE FELT SCARED OR PANICKY FOR NO GOOD REASON: YES, SOMETIMES
TOTAL SCORE: 5
I HAVE BEEN SO UNHAPPY THAT I HAVE BEEN CRYING: NO, NEVER
I HAVE BEEN ABLE TO LAUGH AND SEE THE FUNNY SIDE OF THINGS: AS MUCH AS I ALWAYS COULD
I HAVE LOOKED FORWARD WITH ENJOYMENT TO THINGS: AS MUCH AS I EVER DID

## 2025-06-02 NOTE — PROGRESS NOTES
SUBJECTIVE: Yary is here for a 6-week postpartum checkup.    Delivery date was 4/24/25. She had a repeat c/s of a viable boy, weight 8 pounds 3.4 oz., with no complications.  Since delivery, she has been breast feeding.  She has no signs of infection, bleeding or other complications.  She is not pregnant.  We discussed contraceptions and she has chosen condoms.  She  has not had intercourse since delivery and complains of No discomfort. Patient screened for postpartum depression and complaints are NEGATIVE. Screening has also been completed for intimate partner violence.    EXAM:  Today's Depression Rating was       3/23/2023     1:57 PM   PHQ-9 SCORE   PHQ-9 Total Score 1       /74   Wt 57.6 kg (127 lb)   LMP 07/22/2024 (Exact Date)   Breastfeeding Yes   BMI 24.00 kg/m     GENERAL healthy, alert and no distress  EYES EOMI, intact visual fields, PERRL and funduscopic deferred  HENT: Normocephalic. TM's grossly normal, oropharynx without significant findings.  NECK: no adenopathy, no asymmetry, masses, or scars and thyroid normal to palpation  RESP: Clear to auscultation  BREASTS: NEGATIVE  CV: NEGATIVE  LYMPH: NEGATIVE  GI: aorta normal and bowel sounds normal  : no hernia detected  GYN PELVIC: NEGATIVE, normal external genitalia, normal groin lymphatics, normal urethral meatus, normal vaginal mucosa, normal cervix and normal adnexa, no masses or tenderness  MS: NEGATIVE, Extremities normal. No deformaties, edema or skin discoloration.  SKIN: no ulcers, lesions or rash  NEURO: alert/oriented to person, location and time, CN 2-12 intact, strength 5/5 throughout and symmetric, sensation to light touch grossly intact throughout  PSYCH: NEGATIVE    Discussed risks and benefits of contraception options in detail including oral contraceptive pills, injection, IUD, ring and sterilization. Patient elects condoms.  No contraindications noted.     Patient did not have GDM:     ASSESSMENT:   Normal postpartum exam  after repeat c/s.  Hypothyroidism - check TSH     PLAN:  Return as needed or at time of next expected pap, pelvic, or breast exam.  Check TSH today     Dr. Neris Nur, DO    Obstetrics and Gynecology  [facility] [place] and [place]

## 2025-06-02 NOTE — PATIENT INSTRUCTIONS
Return yearly       Dr. Neris Nur, DO    Obstetrics and Gynecology  [facility] [place] and [place]

## 2025-06-04 ENCOUNTER — RESULTS FOLLOW-UP (OUTPATIENT)
Dept: OBGYN | Facility: CLINIC | Age: 35
End: 2025-06-04

## 2025-06-12 ENCOUNTER — MYC MEDICAL ADVICE (OUTPATIENT)
Dept: FAMILY MEDICINE | Facility: CLINIC | Age: 35
End: 2025-06-12
Payer: COMMERCIAL

## 2025-06-12 NOTE — TELEPHONE ENCOUNTER
"Thyroid checked on 6/2, see labs.     Result note  \"Breezy Pringle, recommend to touch base with your pcp or endocrinologist regarding TSH which is a bit suppressed, likely need to decrease your thyroid medication a little bit.       Dr. Neris Nur, DO    Obstetrics and Gynecology  [facility] [place] and [place] \"    Routing to provider to review and advise.     ZI MURILLO RN on 6/12/2025 at 4:49 PM   [facility]     "

## 2025-06-14 NOTE — TELEPHONE ENCOUNTER
Please have patient schedule appt.  Last OV was ~1 year ago.   If desired, she may be due for her annual visit/med check      Rubi Madison MBA, MS, PA-C  [facility] [place]

## 2025-06-26 ENCOUNTER — E-VISIT (OUTPATIENT)
Dept: FAMILY MEDICINE | Facility: CLINIC | Age: 35
End: 2025-06-26
Payer: COMMERCIAL

## 2025-06-26 DIAGNOSIS — E06.3 HYPOTHYROIDISM DUE TO HASHIMOTO'S THYROIDITIS: Primary | ICD-10-CM

## 2025-07-02 ENCOUNTER — MEDICAL CORRESPONDENCE (OUTPATIENT)
Dept: HEALTH INFORMATION MANAGEMENT | Facility: CLINIC | Age: 35
End: 2025-07-02
Payer: COMMERCIAL

## 2025-07-26 ENCOUNTER — HEALTH MAINTENANCE LETTER (OUTPATIENT)
Age: 35
End: 2025-07-26

## 2025-08-03 ENCOUNTER — APPOINTMENT (OUTPATIENT)
Dept: CT IMAGING | Facility: CLINIC | Age: 35
End: 2025-08-03
Attending: PHYSICIAN ASSISTANT
Payer: COMMERCIAL

## 2025-08-03 ENCOUNTER — HOSPITAL ENCOUNTER (EMERGENCY)
Facility: CLINIC | Age: 35
Discharge: HOME | End: 2025-08-03
Attending: PHYSICIAN ASSISTANT
Payer: COMMERCIAL

## 2025-08-03 VITALS
SYSTOLIC BLOOD PRESSURE: 112 MMHG | HEART RATE: 90 BPM | DIASTOLIC BLOOD PRESSURE: 78 MMHG | OXYGEN SATURATION: 98 % | RESPIRATION RATE: 16 BRPM | WEIGHT: 124.56 LBS | HEIGHT: 61 IN | BODY MASS INDEX: 23.52 KG/M2 | TEMPERATURE: 98.3 F

## 2025-08-03 DIAGNOSIS — N39.0 URINARY TRACT INFECTION WITHOUT HEMATURIA, SITE UNSPECIFIED: Primary | ICD-10-CM

## 2025-08-03 DIAGNOSIS — R11.2 NAUSEA VOMITING AND DIARRHEA: ICD-10-CM

## 2025-08-03 DIAGNOSIS — R19.7 NAUSEA VOMITING AND DIARRHEA: ICD-10-CM

## 2025-08-03 LAB
ALBUMIN UR-MCNC: NEGATIVE MG/DL
ANION GAP SERPL CALCULATED.3IONS-SCNC: 15 MMOL/L (ref 7–15)
APPEARANCE UR: CLEAR
BACTERIA #/AREA URNS HPF: ABNORMAL /HPF
BILIRUB UR QL STRIP: NEGATIVE
BUN SERPL-MCNC: 8.7 MG/DL (ref 6–20)
CALCIUM SERPL-MCNC: 9.5 MG/DL (ref 8.8–10.4)
CHLORIDE SERPL-SCNC: 102 MMOL/L (ref 98–107)
COLOR UR AUTO: ABNORMAL
CREAT SERPL-MCNC: 0.79 MG/DL (ref 0.51–0.95)
EGFRCR SERPLBLD CKD-EPI 2021: >90 ML/MIN/1.73M2
ERYTHROCYTE [DISTWIDTH] IN BLOOD BY AUTOMATED COUNT: 12.2 % (ref 10–15)
FLUAV RNA SPEC QL NAA+PROBE: NEGATIVE
FLUBV RNA RESP QL NAA+PROBE: NEGATIVE
GLUCOSE SERPL-MCNC: 91 MG/DL (ref 70–99)
GLUCOSE UR STRIP-MCNC: NEGATIVE MG/DL
HCG SERPL QL: NEGATIVE
HCO3 SERPL-SCNC: 21 MMOL/L (ref 22–29)
HCT VFR BLD AUTO: 39.6 % (ref 35–47)
HGB BLD-MCNC: 13.9 G/DL (ref 11.7–15.7)
HGB UR QL STRIP: NEGATIVE
HOLD SPECIMEN: NORMAL
KETONES UR STRIP-MCNC: 20 MG/DL
LEUKOCYTE ESTERASE UR QL STRIP: ABNORMAL
MCH RBC QN AUTO: 29.4 PG (ref 26.5–33)
MCHC RBC AUTO-ENTMCNC: 35.1 G/DL (ref 31.5–36.5)
MCV RBC AUTO: 84 FL (ref 78–100)
NITRATE UR QL: NEGATIVE
PH UR STRIP: 7 [PH] (ref 5–7)
PLATELET # BLD AUTO: 261 10E3/UL (ref 150–450)
POTASSIUM SERPL-SCNC: 3.9 MMOL/L (ref 3.4–5.3)
RBC # BLD AUTO: 4.72 10E6/UL (ref 3.8–5.2)
RBC URINE: 1 /HPF
RSV RNA SPEC NAA+PROBE: NEGATIVE
SARS-COV-2 RNA RESP QL NAA+PROBE: NEGATIVE
SODIUM SERPL-SCNC: 138 MMOL/L (ref 135–145)
SP GR UR STRIP: 1 (ref 1–1.03)
SQUAMOUS EPITHELIAL: 4 /HPF
TSH SERPL DL<=0.005 MIU/L-ACNC: 0.89 UIU/ML (ref 0.3–4.2)
UROBILINOGEN UR STRIP-MCNC: NORMAL MG/DL
WBC # BLD AUTO: 6.9 10E3/UL (ref 4–11)
WBC URINE: 7 /HPF

## 2025-08-03 PROCEDURE — 250N000013 HC RX MED GY IP 250 OP 250 PS 637: Performed by: PHYSICIAN ASSISTANT

## 2025-08-03 PROCEDURE — 96374 THER/PROPH/DIAG INJ IV PUSH: CPT | Mod: 59

## 2025-08-03 PROCEDURE — 74177 CT ABD & PELVIS W/CONTRAST: CPT

## 2025-08-03 PROCEDURE — 96361 HYDRATE IV INFUSION ADD-ON: CPT

## 2025-08-03 PROCEDURE — 36415 COLL VENOUS BLD VENIPUNCTURE: CPT | Performed by: PHYSICIAN ASSISTANT

## 2025-08-03 PROCEDURE — 96375 TX/PRO/DX INJ NEW DRUG ADDON: CPT

## 2025-08-03 PROCEDURE — 81001 URINALYSIS AUTO W/SCOPE: CPT | Performed by: PHYSICIAN ASSISTANT

## 2025-08-03 PROCEDURE — 85014 HEMATOCRIT: CPT | Performed by: PHYSICIAN ASSISTANT

## 2025-08-03 PROCEDURE — 99285 EMERGENCY DEPT VISIT HI MDM: CPT | Mod: 25 | Performed by: PHYSICIAN ASSISTANT

## 2025-08-03 PROCEDURE — 84443 ASSAY THYROID STIM HORMONE: CPT | Performed by: PHYSICIAN ASSISTANT

## 2025-08-03 PROCEDURE — 84703 CHORIONIC GONADOTROPIN ASSAY: CPT | Performed by: PHYSICIAN ASSISTANT

## 2025-08-03 PROCEDURE — 250N000011 HC RX IP 250 OP 636: Performed by: PHYSICIAN ASSISTANT

## 2025-08-03 PROCEDURE — 82310 ASSAY OF CALCIUM: CPT | Performed by: PHYSICIAN ASSISTANT

## 2025-08-03 PROCEDURE — 87086 URINE CULTURE/COLONY COUNT: CPT | Performed by: PHYSICIAN ASSISTANT

## 2025-08-03 PROCEDURE — 258N000003 HC RX IP 258 OP 636: Performed by: PHYSICIAN ASSISTANT

## 2025-08-03 PROCEDURE — 87637 SARSCOV2&INF A&B&RSV AMP PRB: CPT | Performed by: PHYSICIAN ASSISTANT

## 2025-08-03 RX ORDER — ONDANSETRON 2 MG/ML
4 INJECTION INTRAMUSCULAR; INTRAVENOUS ONCE
Status: COMPLETED | OUTPATIENT
Start: 2025-08-03 | End: 2025-08-03

## 2025-08-03 RX ORDER — KETOROLAC TROMETHAMINE 15 MG/ML
15 INJECTION, SOLUTION INTRAMUSCULAR; INTRAVENOUS ONCE
Status: DISCONTINUED | OUTPATIENT
Start: 2025-08-03 | End: 2025-08-03

## 2025-08-03 RX ORDER — KETOROLAC TROMETHAMINE 15 MG/ML
15 INJECTION, SOLUTION INTRAMUSCULAR; INTRAVENOUS ONCE
Status: COMPLETED | OUTPATIENT
Start: 2025-08-03 | End: 2025-08-03

## 2025-08-03 RX ORDER — CEPHALEXIN 500 MG/1
500 CAPSULE ORAL ONCE
Status: COMPLETED | OUTPATIENT
Start: 2025-08-03 | End: 2025-08-03

## 2025-08-03 RX ORDER — IOPAMIDOL 755 MG/ML
500 INJECTION, SOLUTION INTRAVASCULAR ONCE
Status: COMPLETED | OUTPATIENT
Start: 2025-08-03 | End: 2025-08-03

## 2025-08-03 RX ORDER — ONDANSETRON 2 MG/ML
4 INJECTION INTRAMUSCULAR; INTRAVENOUS ONCE
Status: DISCONTINUED | OUTPATIENT
Start: 2025-08-03 | End: 2025-08-03

## 2025-08-03 RX ORDER — ONDANSETRON 4 MG/1
4 TABLET, ORALLY DISINTEGRATING ORAL EVERY 8 HOURS PRN
Qty: 10 TABLET | Refills: 0 | Status: SHIPPED | OUTPATIENT
Start: 2025-08-03 | End: 2025-08-04

## 2025-08-03 RX ORDER — CEPHALEXIN 500 MG/1
500 CAPSULE ORAL 2 TIMES DAILY
Qty: 14 CAPSULE | Refills: 0 | Status: ON HOLD | OUTPATIENT
Start: 2025-08-03 | End: 2025-08-05

## 2025-08-03 RX ORDER — ACETAMINOPHEN 500 MG
1000 TABLET ORAL EVERY 4 HOURS PRN
Status: DISCONTINUED | OUTPATIENT
Start: 2025-08-03 | End: 2025-08-03 | Stop reason: HOSPADM

## 2025-08-03 RX ORDER — IOPAMIDOL 755 MG/ML
500 INJECTION, SOLUTION INTRAVASCULAR ONCE
Status: DISCONTINUED | OUTPATIENT
Start: 2025-08-03 | End: 2025-08-03

## 2025-08-03 RX ADMIN — ACETAMINOPHEN 1000 MG: 500 TABLET, FILM COATED ORAL at 16:35

## 2025-08-03 RX ADMIN — IOPAMIDOL 62 ML: 755 INJECTION, SOLUTION INTRAVENOUS at 19:28

## 2025-08-03 RX ADMIN — SODIUM CHLORIDE 1000 ML: 0.9 INJECTION, SOLUTION INTRAVENOUS at 16:36

## 2025-08-03 RX ADMIN — KETOROLAC TROMETHAMINE 15 MG: 15 INJECTION, SOLUTION INTRAMUSCULAR; INTRAVENOUS at 18:26

## 2025-08-03 RX ADMIN — ONDANSETRON 4 MG: 2 INJECTION, SOLUTION INTRAMUSCULAR; INTRAVENOUS at 16:35

## 2025-08-03 RX ADMIN — CEPHALEXIN 500 MG: 500 CAPSULE ORAL at 20:29

## 2025-08-03 ASSESSMENT — ACTIVITIES OF DAILY LIVING (ADL)
ADLS_ACUITY_SCORE: 60

## 2025-08-04 ENCOUNTER — HOSPITAL ENCOUNTER (OUTPATIENT)
Facility: CLINIC | Age: 35
Setting detail: OBSERVATION
Discharge: HOME | End: 2025-08-05
Attending: EMERGENCY MEDICINE | Admitting: HOSPITALIST
Payer: COMMERCIAL

## 2025-08-04 DIAGNOSIS — R11.0 NAUSEA: ICD-10-CM

## 2025-08-04 DIAGNOSIS — N10 PYELONEPHRITIS, ACUTE: Primary | ICD-10-CM

## 2025-08-04 LAB
ALBUMIN UR-MCNC: NEGATIVE MG/DL
ANION GAP SERPL CALCULATED.3IONS-SCNC: 14 MMOL/L (ref 7–15)
APPEARANCE UR: CLEAR
BILIRUB UR QL STRIP: NEGATIVE
BUN SERPL-MCNC: 7.8 MG/DL (ref 6–20)
CALCIUM SERPL-MCNC: 9.2 MG/DL (ref 8.8–10.4)
CHLORIDE SERPL-SCNC: 105 MMOL/L (ref 98–107)
COLOR UR AUTO: ABNORMAL
CREAT SERPL-MCNC: 0.87 MG/DL (ref 0.51–0.95)
EGFRCR SERPLBLD CKD-EPI 2021: 90 ML/MIN/1.73M2
ERYTHROCYTE [DISTWIDTH] IN BLOOD BY AUTOMATED COUNT: 12.2 % (ref 10–15)
GLUCOSE SERPL-MCNC: 85 MG/DL (ref 70–99)
GLUCOSE UR STRIP-MCNC: NEGATIVE MG/DL
HCO3 SERPL-SCNC: 23 MMOL/L (ref 22–29)
HCT VFR BLD AUTO: 39.3 % (ref 35–47)
HGB BLD-MCNC: 13.6 G/DL (ref 11.7–15.7)
HGB UR QL STRIP: NEGATIVE
HOLD SPECIMEN: NORMAL
HOLD SPECIMEN: NORMAL
KETONES UR STRIP-MCNC: 10 MG/DL
LACTATE SERPL-SCNC: 1 MMOL/L (ref 0.7–2)
LEUKOCYTE ESTERASE UR QL STRIP: ABNORMAL
MCH RBC QN AUTO: 29.4 PG (ref 26.5–33)
MCHC RBC AUTO-ENTMCNC: 34.6 G/DL (ref 31.5–36.5)
MCV RBC AUTO: 85 FL (ref 78–100)
MUCOUS THREADS #/AREA URNS LPF: PRESENT /LPF
NITRATE UR QL: NEGATIVE
PH UR STRIP: 6.5 [PH] (ref 5–7)
PLATELET # BLD AUTO: 251 10E3/UL (ref 150–450)
POTASSIUM SERPL-SCNC: 4.2 MMOL/L (ref 3.4–5.3)
RBC # BLD AUTO: 4.63 10E6/UL (ref 3.8–5.2)
RBC URINE: 2 /HPF
SODIUM SERPL-SCNC: 142 MMOL/L (ref 135–145)
SP GR UR STRIP: 1 (ref 1–1.03)
SQUAMOUS EPITHELIAL: 3 /HPF
UROBILINOGEN UR STRIP-MCNC: NORMAL MG/DL
WBC # BLD AUTO: 5.2 10E3/UL (ref 4–11)
WBC URINE: 1 /HPF

## 2025-08-04 PROCEDURE — 36415 COLL VENOUS BLD VENIPUNCTURE: CPT | Performed by: EMERGENCY MEDICINE

## 2025-08-04 PROCEDURE — G0378 HOSPITAL OBSERVATION PER HR: HCPCS

## 2025-08-04 PROCEDURE — 250N000011 HC RX IP 250 OP 636: Performed by: HOSPITALIST

## 2025-08-04 PROCEDURE — 80048 BASIC METABOLIC PNL TOTAL CA: CPT | Performed by: EMERGENCY MEDICINE

## 2025-08-04 PROCEDURE — 99222 1ST HOSP IP/OBS MODERATE 55: CPT | Performed by: HOSPITALIST

## 2025-08-04 PROCEDURE — 96375 TX/PRO/DX INJ NEW DRUG ADDON: CPT

## 2025-08-04 PROCEDURE — 96361 HYDRATE IV INFUSION ADD-ON: CPT

## 2025-08-04 PROCEDURE — 99285 EMERGENCY DEPT VISIT HI MDM: CPT | Mod: 25 | Performed by: EMERGENCY MEDICINE

## 2025-08-04 PROCEDURE — 96365 THER/PROPH/DIAG IV INF INIT: CPT

## 2025-08-04 PROCEDURE — 81001 URINALYSIS AUTO W/SCOPE: CPT | Performed by: EMERGENCY MEDICINE

## 2025-08-04 PROCEDURE — 87040 BLOOD CULTURE FOR BACTERIA: CPT | Performed by: EMERGENCY MEDICINE

## 2025-08-04 PROCEDURE — 96376 TX/PRO/DX INJ SAME DRUG ADON: CPT

## 2025-08-04 PROCEDURE — 83605 ASSAY OF LACTIC ACID: CPT | Performed by: EMERGENCY MEDICINE

## 2025-08-04 PROCEDURE — 85018 HEMOGLOBIN: CPT | Performed by: EMERGENCY MEDICINE

## 2025-08-04 PROCEDURE — 250N000011 HC RX IP 250 OP 636: Performed by: EMERGENCY MEDICINE

## 2025-08-04 PROCEDURE — 258N000003 HC RX IP 258 OP 636: Performed by: HOSPITALIST

## 2025-08-04 PROCEDURE — 258N000003 HC RX IP 258 OP 636: Performed by: EMERGENCY MEDICINE

## 2025-08-04 RX ORDER — ONDANSETRON 4 MG/1
4 TABLET, ORALLY DISINTEGRATING ORAL EVERY 6 HOURS PRN
Status: DISCONTINUED | OUTPATIENT
Start: 2025-08-04 | End: 2025-08-05 | Stop reason: HOSPADM

## 2025-08-04 RX ORDER — AMOXICILLIN 250 MG
2 CAPSULE ORAL 2 TIMES DAILY PRN
Status: DISCONTINUED | OUTPATIENT
Start: 2025-08-04 | End: 2025-08-05 | Stop reason: HOSPADM

## 2025-08-04 RX ORDER — PRENATAL VIT/IRON FUM/FOLIC AC 27MG-0.8MG
1 TABLET ORAL DAILY
Status: DISCONTINUED | OUTPATIENT
Start: 2025-08-04 | End: 2025-08-05 | Stop reason: HOSPADM

## 2025-08-04 RX ORDER — CEFTRIAXONE 1 G/1
1 INJECTION, POWDER, FOR SOLUTION INTRAMUSCULAR; INTRAVENOUS ONCE
Status: COMPLETED | OUTPATIENT
Start: 2025-08-04 | End: 2025-08-04

## 2025-08-04 RX ORDER — CEFTRIAXONE 1 G/1
1 INJECTION, POWDER, FOR SOLUTION INTRAMUSCULAR; INTRAVENOUS EVERY 24 HOURS
Status: DISCONTINUED | OUTPATIENT
Start: 2025-08-05 | End: 2025-08-05 | Stop reason: HOSPADM

## 2025-08-04 RX ORDER — KETOROLAC TROMETHAMINE 15 MG/ML
15 INJECTION, SOLUTION INTRAMUSCULAR; INTRAVENOUS ONCE
Status: COMPLETED | OUTPATIENT
Start: 2025-08-04 | End: 2025-08-04

## 2025-08-04 RX ORDER — LEVOTHYROXINE SODIUM 125 UG/1
125 TABLET ORAL DAILY
Status: DISCONTINUED | OUTPATIENT
Start: 2025-08-04 | End: 2025-08-05 | Stop reason: HOSPADM

## 2025-08-04 RX ORDER — METOCLOPRAMIDE HYDROCHLORIDE 5 MG/ML
10 INJECTION INTRAMUSCULAR; INTRAVENOUS ONCE
Status: DISCONTINUED | OUTPATIENT
Start: 2025-08-04 | End: 2025-08-04

## 2025-08-04 RX ORDER — ACETAMINOPHEN 325 MG/1
650 TABLET ORAL EVERY 4 HOURS PRN
Status: DISCONTINUED | OUTPATIENT
Start: 2025-08-04 | End: 2025-08-05 | Stop reason: HOSPADM

## 2025-08-04 RX ORDER — ACETAMINOPHEN 650 MG/1
650 SUPPOSITORY RECTAL EVERY 4 HOURS PRN
Status: DISCONTINUED | OUTPATIENT
Start: 2025-08-04 | End: 2025-08-05 | Stop reason: HOSPADM

## 2025-08-04 RX ORDER — AMOXICILLIN 250 MG
1 CAPSULE ORAL 2 TIMES DAILY PRN
Status: DISCONTINUED | OUTPATIENT
Start: 2025-08-04 | End: 2025-08-05 | Stop reason: HOSPADM

## 2025-08-04 RX ORDER — ONDANSETRON 2 MG/ML
4 INJECTION INTRAMUSCULAR; INTRAVENOUS EVERY 6 HOURS PRN
Status: DISCONTINUED | OUTPATIENT
Start: 2025-08-04 | End: 2025-08-05 | Stop reason: HOSPADM

## 2025-08-04 RX ORDER — ONDANSETRON 2 MG/ML
4 INJECTION INTRAMUSCULAR; INTRAVENOUS EVERY 30 MIN PRN
Status: DISCONTINUED | OUTPATIENT
Start: 2025-08-04 | End: 2025-08-04

## 2025-08-04 RX ORDER — SODIUM CHLORIDE 9 MG/ML
INJECTION, SOLUTION INTRAVENOUS CONTINUOUS
Status: DISCONTINUED | OUTPATIENT
Start: 2025-08-04 | End: 2025-08-05 | Stop reason: HOSPADM

## 2025-08-04 RX ORDER — PROCHLORPERAZINE MALEATE 10 MG
10 TABLET ORAL EVERY 6 HOURS PRN
Status: DISCONTINUED | OUTPATIENT
Start: 2025-08-04 | End: 2025-08-05 | Stop reason: HOSPADM

## 2025-08-04 RX ADMIN — ONDANSETRON 4 MG: 2 INJECTION, SOLUTION INTRAMUSCULAR; INTRAVENOUS at 15:47

## 2025-08-04 RX ADMIN — KETOROLAC TROMETHAMINE 15 MG: 15 INJECTION, SOLUTION INTRAMUSCULAR; INTRAVENOUS at 13:00

## 2025-08-04 RX ADMIN — SODIUM CHLORIDE: 0.9 INJECTION, SOLUTION INTRAVENOUS at 19:55

## 2025-08-04 RX ADMIN — ONDANSETRON 4 MG: 2 INJECTION, SOLUTION INTRAMUSCULAR; INTRAVENOUS at 21:52

## 2025-08-04 RX ADMIN — PROCHLORPERAZINE EDISYLATE 5 MG: 5 INJECTION INTRAMUSCULAR; INTRAVENOUS at 17:17

## 2025-08-04 RX ADMIN — SODIUM CHLORIDE 1000 ML: 9 INJECTION, SOLUTION INTRAVENOUS at 14:45

## 2025-08-04 RX ADMIN — SODIUM CHLORIDE 1000 ML: 0.9 INJECTION, SOLUTION INTRAVENOUS at 13:00

## 2025-08-04 RX ADMIN — CEFTRIAXONE 1 G: 1 INJECTION, POWDER, FOR SOLUTION INTRAMUSCULAR; INTRAVENOUS at 13:00

## 2025-08-04 ASSESSMENT — COLUMBIA-SUICIDE SEVERITY RATING SCALE - C-SSRS
6. HAVE YOU EVER DONE ANYTHING, STARTED TO DO ANYTHING, OR PREPARED TO DO ANYTHING TO END YOUR LIFE?: NO
2. HAVE YOU ACTUALLY HAD ANY THOUGHTS OF KILLING YOURSELF IN THE PAST MONTH?: NO
1. IN THE PAST MONTH, HAVE YOU WISHED YOU WERE DEAD OR WISHED YOU COULD GO TO SLEEP AND NOT WAKE UP?: NO

## 2025-08-04 ASSESSMENT — ACTIVITIES OF DAILY LIVING (ADL)
ADLS_ACUITY_SCORE: 56
ADLS_ACUITY_SCORE: 30
ADLS_ACUITY_SCORE: 56

## 2025-08-05 VITALS
SYSTOLIC BLOOD PRESSURE: 101 MMHG | DIASTOLIC BLOOD PRESSURE: 56 MMHG | BODY MASS INDEX: 23.77 KG/M2 | HEIGHT: 61 IN | HEART RATE: 87 BPM | OXYGEN SATURATION: 96 % | WEIGHT: 125.88 LBS | TEMPERATURE: 97.7 F | RESPIRATION RATE: 16 BRPM

## 2025-08-05 LAB
ANION GAP SERPL CALCULATED.3IONS-SCNC: 23 MMOL/L (ref 7–15)
BACTERIA UR CULT: NORMAL
BUN SERPL-MCNC: 10.3 MG/DL (ref 6–20)
CALCIUM SERPL-MCNC: 9.2 MG/DL (ref 8.8–10.4)
CHLORIDE SERPL-SCNC: 103 MMOL/L (ref 98–107)
CREAT SERPL-MCNC: 0.83 MG/DL (ref 0.51–0.95)
EGFRCR SERPLBLD CKD-EPI 2021: >90 ML/MIN/1.73M2
ERYTHROCYTE [DISTWIDTH] IN BLOOD BY AUTOMATED COUNT: 12.3 % (ref 10–15)
GLUCOSE BLDC GLUCOMTR-MCNC: 42 MG/DL (ref 70–99)
GLUCOSE BLDC GLUCOMTR-MCNC: 83 MG/DL (ref 70–99)
GLUCOSE SERPL-MCNC: 42 MG/DL (ref 70–99)
HCO3 SERPL-SCNC: 14 MMOL/L (ref 22–29)
HCT VFR BLD AUTO: 40 % (ref 35–47)
HGB BLD-MCNC: 13.4 G/DL (ref 11.7–15.7)
MCH RBC QN AUTO: 29.1 PG (ref 26.5–33)
MCHC RBC AUTO-ENTMCNC: 33.5 G/DL (ref 31.5–36.5)
MCV RBC AUTO: 87 FL (ref 78–100)
PLATELET # BLD AUTO: 274 10E3/UL (ref 150–450)
POTASSIUM SERPL-SCNC: 3.7 MMOL/L (ref 3.4–5.3)
RBC # BLD AUTO: 4.61 10E6/UL (ref 3.8–5.2)
SODIUM SERPL-SCNC: 140 MMOL/L (ref 135–145)
WBC # BLD AUTO: 9 10E3/UL (ref 4–11)

## 2025-08-05 PROCEDURE — 250N000011 HC RX IP 250 OP 636: Performed by: HOSPITALIST

## 2025-08-05 PROCEDURE — 82962 GLUCOSE BLOOD TEST: CPT

## 2025-08-05 PROCEDURE — 80048 BASIC METABOLIC PNL TOTAL CA: CPT | Performed by: HOSPITALIST

## 2025-08-05 PROCEDURE — 36415 COLL VENOUS BLD VENIPUNCTURE: CPT | Performed by: HOSPITALIST

## 2025-08-05 PROCEDURE — 99238 HOSP IP/OBS DSCHRG MGMT 30/<: CPT | Performed by: INTERNAL MEDICINE

## 2025-08-05 PROCEDURE — 96376 TX/PRO/DX INJ SAME DRUG ADON: CPT

## 2025-08-05 PROCEDURE — 250N000013 HC RX MED GY IP 250 OP 250 PS 637: Performed by: HOSPITALIST

## 2025-08-05 PROCEDURE — 85014 HEMATOCRIT: CPT | Performed by: HOSPITALIST

## 2025-08-05 PROCEDURE — 258N000003 HC RX IP 258 OP 636: Performed by: HOSPITALIST

## 2025-08-05 PROCEDURE — G0378 HOSPITAL OBSERVATION PER HR: HCPCS

## 2025-08-05 RX ORDER — ACETAMINOPHEN 325 MG/1
650 TABLET ORAL EVERY 4 HOURS PRN
Status: SHIPPED
Start: 2025-08-05

## 2025-08-05 RX ORDER — CEFDINIR 300 MG/1
300 CAPSULE ORAL 2 TIMES DAILY
Qty: 14 CAPSULE | Refills: 0 | Status: SHIPPED | OUTPATIENT
Start: 2025-08-05 | End: 2025-08-12

## 2025-08-05 RX ORDER — ONDANSETRON 4 MG/1
4 TABLET, ORALLY DISINTEGRATING ORAL EVERY 6 HOURS PRN
Qty: 10 TABLET | Refills: 0 | Status: SHIPPED | OUTPATIENT
Start: 2025-08-05

## 2025-08-05 RX ORDER — IBUPROFEN 200 MG
400 TABLET ORAL EVERY 4 HOURS PRN
Status: SHIPPED
Start: 2025-08-05

## 2025-08-05 RX ADMIN — ONDANSETRON 4 MG: 2 INJECTION, SOLUTION INTRAMUSCULAR; INTRAVENOUS at 07:40

## 2025-08-05 RX ADMIN — SODIUM CHLORIDE: 0.9 INJECTION, SOLUTION INTRAVENOUS at 05:12

## 2025-08-05 RX ADMIN — LEVOTHYROXINE SODIUM 125 MCG: 0.12 TABLET ORAL at 07:40

## 2025-08-05 ASSESSMENT — ACTIVITIES OF DAILY LIVING (ADL)
ADLS_ACUITY_SCORE: 30

## 2025-08-06 ENCOUNTER — PATIENT OUTREACH (OUTPATIENT)
Dept: CARE COORDINATION | Facility: CLINIC | Age: 35
End: 2025-08-06
Payer: COMMERCIAL

## 2025-08-07 LAB
BACTERIA SPEC CULT: NORMAL
BACTERIA SPEC CULT: NORMAL

## 2025-08-09 LAB
BACTERIA SPEC CULT: NO GROWTH
BACTERIA SPEC CULT: NO GROWTH

## 2025-08-13 ENCOUNTER — HOSPITAL ENCOUNTER (EMERGENCY)
Facility: CLINIC | Age: 35
Discharge: HOME | End: 2025-08-14
Attending: EMERGENCY MEDICINE
Payer: COMMERCIAL

## 2025-08-13 ENCOUNTER — APPOINTMENT (OUTPATIENT)
Dept: CT IMAGING | Facility: CLINIC | Age: 35
End: 2025-08-13
Attending: EMERGENCY MEDICINE
Payer: COMMERCIAL

## 2025-08-13 DIAGNOSIS — R30.0 DYSURIA: Primary | ICD-10-CM

## 2025-08-13 LAB
ALBUMIN UR-MCNC: NEGATIVE MG/DL
ANION GAP SERPL CALCULATED.3IONS-SCNC: 14 MMOL/L (ref 7–15)
APPEARANCE UR: CLEAR
BACTERIA #/AREA URNS HPF: ABNORMAL /HPF
BASOPHILS # BLD AUTO: 0.05 10E3/UL (ref 0–0.2)
BASOPHILS NFR BLD AUTO: 0.5 %
BILIRUB UR QL STRIP: NEGATIVE
BUN SERPL-MCNC: 12.1 MG/DL (ref 6–20)
CALCIUM SERPL-MCNC: 9.8 MG/DL (ref 8.8–10.4)
CHLORIDE SERPL-SCNC: 103 MMOL/L (ref 98–107)
COLOR UR AUTO: ABNORMAL
CREAT SERPL-MCNC: 0.88 MG/DL (ref 0.51–0.95)
EGFRCR SERPLBLD CKD-EPI 2021: 89 ML/MIN/1.73M2
EOSINOPHIL # BLD AUTO: 0.05 10E3/UL (ref 0–0.7)
EOSINOPHIL NFR BLD AUTO: 0.5 %
ERYTHROCYTE [DISTWIDTH] IN BLOOD BY AUTOMATED COUNT: 12.6 % (ref 10–15)
GLUCOSE SERPL-MCNC: 136 MG/DL (ref 70–99)
GLUCOSE UR STRIP-MCNC: NEGATIVE MG/DL
HCG UR QL: NEGATIVE
HCO3 SERPL-SCNC: 24 MMOL/L (ref 22–29)
HCT VFR BLD AUTO: 39.1 % (ref 35–47)
HGB BLD-MCNC: 13.3 G/DL (ref 11.7–15.7)
HGB UR QL STRIP: NEGATIVE
HOLD SPECIMEN: NORMAL
HOLD SPECIMEN: NORMAL
IMM GRANULOCYTES # BLD: 0.03 10E3/UL
IMM GRANULOCYTES NFR BLD: 0.3 %
KETONES UR STRIP-MCNC: NEGATIVE MG/DL
LEUKOCYTE ESTERASE UR QL STRIP: NEGATIVE
LYMPHOCYTES # BLD AUTO: 1.51 10E3/UL (ref 0.8–5.3)
LYMPHOCYTES NFR BLD AUTO: 16 %
MCH RBC QN AUTO: 29.4 PG (ref 26.5–33)
MCHC RBC AUTO-ENTMCNC: 34 G/DL (ref 31.5–36.5)
MCV RBC AUTO: 86.3 FL (ref 78–100)
MONOCYTES # BLD AUTO: 0.63 10E3/UL (ref 0–1.3)
MONOCYTES NFR BLD AUTO: 6.7 %
NEUTROPHILS # BLD AUTO: 7.14 10E3/UL (ref 1.6–8.3)
NEUTROPHILS NFR BLD AUTO: 76 %
NITRATE UR QL: NEGATIVE
NRBC # BLD AUTO: <0.03 10E3/UL
NRBC BLD AUTO-RTO: 0 /100
PH UR STRIP: 7 [PH] (ref 5–7)
PLATELET # BLD AUTO: 304 10E3/UL (ref 150–450)
POTASSIUM SERPL-SCNC: 4.3 MMOL/L (ref 3.4–5.3)
RBC # BLD AUTO: 4.53 10E6/UL (ref 3.8–5.2)
RBC URINE: 0 /HPF
SODIUM SERPL-SCNC: 141 MMOL/L (ref 135–145)
SP GR UR STRIP: 1 (ref 1–1.03)
SQUAMOUS EPITHELIAL: 1 /HPF
UROBILINOGEN UR STRIP-MCNC: NORMAL MG/DL
WBC # BLD AUTO: 9.41 10E3/UL (ref 4–11)
WBC URINE: <1 /HPF

## 2025-08-13 PROCEDURE — 81025 URINE PREGNANCY TEST: CPT | Performed by: EMERGENCY MEDICINE

## 2025-08-13 PROCEDURE — 36415 COLL VENOUS BLD VENIPUNCTURE: CPT | Performed by: EMERGENCY MEDICINE

## 2025-08-13 PROCEDURE — 81001 URINALYSIS AUTO W/SCOPE: CPT | Performed by: EMERGENCY MEDICINE

## 2025-08-13 PROCEDURE — 80048 BASIC METABOLIC PNL TOTAL CA: CPT | Performed by: EMERGENCY MEDICINE

## 2025-08-13 PROCEDURE — 81003 URINALYSIS AUTO W/O SCOPE: CPT | Performed by: EMERGENCY MEDICINE

## 2025-08-13 PROCEDURE — 74177 CT ABD & PELVIS W/CONTRAST: CPT

## 2025-08-13 PROCEDURE — 87086 URINE CULTURE/COLONY COUNT: CPT | Performed by: EMERGENCY MEDICINE

## 2025-08-13 PROCEDURE — 99285 EMERGENCY DEPT VISIT HI MDM: CPT | Mod: 25 | Performed by: EMERGENCY MEDICINE

## 2025-08-13 PROCEDURE — 250N000011 HC RX IP 250 OP 636: Performed by: EMERGENCY MEDICINE

## 2025-08-13 PROCEDURE — 85025 COMPLETE CBC W/AUTO DIFF WBC: CPT | Performed by: EMERGENCY MEDICINE

## 2025-08-13 PROCEDURE — 99285 EMERGENCY DEPT VISIT HI MDM: CPT | Mod: 25

## 2025-08-13 PROCEDURE — 250N000009 HC RX 250: Performed by: EMERGENCY MEDICINE

## 2025-08-13 RX ORDER — IOPAMIDOL 755 MG/ML
500 INJECTION, SOLUTION INTRAVASCULAR ONCE
Status: COMPLETED | OUTPATIENT
Start: 2025-08-13 | End: 2025-08-13

## 2025-08-13 RX ADMIN — IOPAMIDOL 63 ML: 755 INJECTION, SOLUTION INTRAVENOUS at 23:55

## 2025-08-13 RX ADMIN — SODIUM CHLORIDE 56 ML: 9 INJECTION, SOLUTION INTRAVENOUS at 23:56

## 2025-08-13 ASSESSMENT — COLUMBIA-SUICIDE SEVERITY RATING SCALE - C-SSRS
6. HAVE YOU EVER DONE ANYTHING, STARTED TO DO ANYTHING, OR PREPARED TO DO ANYTHING TO END YOUR LIFE?: NO
1. IN THE PAST MONTH, HAVE YOU WISHED YOU WERE DEAD OR WISHED YOU COULD GO TO SLEEP AND NOT WAKE UP?: NO
2. HAVE YOU ACTUALLY HAD ANY THOUGHTS OF KILLING YOURSELF IN THE PAST MONTH?: NO

## 2025-08-14 VITALS
RESPIRATION RATE: 18 BRPM | DIASTOLIC BLOOD PRESSURE: 75 MMHG | SYSTOLIC BLOOD PRESSURE: 119 MMHG | OXYGEN SATURATION: 98 % | TEMPERATURE: 97.9 F | HEART RATE: 74 BPM

## 2025-08-14 LAB
CLUE CELLS: NORMAL
TRICHOMONAS, WET PREP: NORMAL
WBC'S/HIGH POWER FIELD, WET PREP: NORMAL
YEAST, WET PREP: NORMAL

## 2025-08-14 PROCEDURE — 250N000013 HC RX MED GY IP 250 OP 250 PS 637: Performed by: EMERGENCY MEDICINE

## 2025-08-14 PROCEDURE — 250N000011 HC RX IP 250 OP 636: Performed by: EMERGENCY MEDICINE

## 2025-08-14 PROCEDURE — 87210 SMEAR WET MOUNT SALINE/INK: CPT | Performed by: EMERGENCY MEDICINE

## 2025-08-14 PROCEDURE — 96374 THER/PROPH/DIAG INJ IV PUSH: CPT | Mod: 59

## 2025-08-14 RX ORDER — ONDANSETRON 4 MG/1
4 TABLET, ORALLY DISINTEGRATING ORAL EVERY 8 HOURS PRN
Qty: 10 TABLET | Refills: 0 | Status: SHIPPED | OUTPATIENT
Start: 2025-08-14 | End: 2025-08-17

## 2025-08-14 RX ORDER — ONDANSETRON 2 MG/ML
4 INJECTION INTRAMUSCULAR; INTRAVENOUS EVERY 30 MIN PRN
Status: DISCONTINUED | OUTPATIENT
Start: 2025-08-14 | End: 2025-08-14 | Stop reason: HOSPADM

## 2025-08-14 RX ORDER — CEFDINIR 300 MG/1
300 CAPSULE ORAL 2 TIMES DAILY
Qty: 20 CAPSULE | Refills: 0 | Status: ON HOLD | OUTPATIENT
Start: 2025-08-14 | End: 2025-08-19

## 2025-08-14 RX ORDER — CEFDINIR 300 MG/1
300 CAPSULE ORAL EVERY 12 HOURS SCHEDULED
Status: DISCONTINUED | OUTPATIENT
Start: 2025-08-14 | End: 2025-08-14

## 2025-08-14 RX ORDER — CEFDINIR 300 MG/1
300 CAPSULE ORAL EVERY 12 HOURS SCHEDULED
Status: DISCONTINUED | OUTPATIENT
Start: 2025-08-14 | End: 2025-08-14 | Stop reason: HOSPADM

## 2025-08-14 RX ADMIN — CEFDINIR 300 MG: 300 CAPSULE ORAL at 03:01

## 2025-08-14 RX ADMIN — ONDANSETRON 4 MG: 2 INJECTION, SOLUTION INTRAMUSCULAR; INTRAVENOUS at 01:55

## 2025-08-14 ASSESSMENT — ACTIVITIES OF DAILY LIVING (ADL)
ADLS_ACUITY_SCORE: 56

## 2025-08-15 LAB — BACTERIA UR CULT: NORMAL

## 2025-08-16 ENCOUNTER — NURSE TRIAGE (OUTPATIENT)
Dept: NURSING | Facility: CLINIC | Age: 35
End: 2025-08-16
Payer: COMMERCIAL

## 2025-08-17 ENCOUNTER — APPOINTMENT (OUTPATIENT)
Dept: CT IMAGING | Facility: CLINIC | Age: 35
End: 2025-08-17
Payer: COMMERCIAL

## 2025-08-17 ENCOUNTER — DOCUMENTATION ONLY (OUTPATIENT)
Dept: ENDOCRINOLOGY | Facility: CLINIC | Age: 35
End: 2025-08-17

## 2025-08-17 ENCOUNTER — HOSPITAL ENCOUNTER (OUTPATIENT)
Facility: CLINIC | Age: 35
Setting detail: OBSERVATION
Discharge: HOME | End: 2025-08-19
Admitting: INTERNAL MEDICINE
Payer: COMMERCIAL

## 2025-08-17 PROBLEM — R53.1 GENERALIZED WEAKNESS: Status: ACTIVE | Noted: 2025-08-17

## 2025-08-17 PROCEDURE — 74177 CT ABD & PELVIS W/CONTRAST: CPT

## 2025-08-17 PROCEDURE — 70450 CT HEAD/BRAIN W/O DYE: CPT

## 2025-08-17 ASSESSMENT — ACTIVITIES OF DAILY LIVING (ADL)
ADLS_ACUITY_SCORE: 56
ADLS_ACUITY_SCORE: 50
ADLS_ACUITY_SCORE: 56
ADLS_ACUITY_SCORE: 50
ADLS_ACUITY_SCORE: 50
ADLS_ACUITY_SCORE: 56
ADLS_ACUITY_SCORE: 50
ADLS_ACUITY_SCORE: 56

## 2025-08-17 ASSESSMENT — COLUMBIA-SUICIDE SEVERITY RATING SCALE - C-SSRS
2. HAVE YOU ACTUALLY HAD ANY THOUGHTS OF KILLING YOURSELF IN THE PAST MONTH?: NO
6. HAVE YOU EVER DONE ANYTHING, STARTED TO DO ANYTHING, OR PREPARED TO DO ANYTHING TO END YOUR LIFE?: NO
1. IN THE PAST MONTH, HAVE YOU WISHED YOU WERE DEAD OR WISHED YOU COULD GO TO SLEEP AND NOT WAKE UP?: NO

## 2025-08-18 ASSESSMENT — ACTIVITIES OF DAILY LIVING (ADL)
ADLS_ACUITY_SCORE: 50

## 2025-08-19 ENCOUNTER — TELEPHONE (OUTPATIENT)
Dept: FAMILY MEDICINE | Facility: CLINIC | Age: 35
End: 2025-08-19

## 2025-08-19 ASSESSMENT — ACTIVITIES OF DAILY LIVING (ADL)
ADLS_ACUITY_SCORE: 50

## 2025-08-20 ENCOUNTER — PATIENT OUTREACH (OUTPATIENT)
Dept: CARE COORDINATION | Facility: CLINIC | Age: 35
End: 2025-08-20
Payer: COMMERCIAL

## 2025-08-21 ENCOUNTER — OFFICE VISIT (OUTPATIENT)
Dept: FAMILY MEDICINE | Facility: CLINIC | Age: 35
End: 2025-08-21
Payer: COMMERCIAL

## 2025-08-21 VITALS
SYSTOLIC BLOOD PRESSURE: 98 MMHG | OXYGEN SATURATION: 98 % | RESPIRATION RATE: 18 BRPM | BODY MASS INDEX: 22.01 KG/M2 | TEMPERATURE: 97.6 F | HEIGHT: 62 IN | HEART RATE: 83 BPM | WEIGHT: 119.6 LBS | DIASTOLIC BLOOD PRESSURE: 64 MMHG

## 2025-08-21 DIAGNOSIS — Z09 HOSPITAL DISCHARGE FOLLOW-UP: Primary | ICD-10-CM

## 2025-08-21 DIAGNOSIS — R11.0 NAUSEA: ICD-10-CM

## 2025-08-21 DIAGNOSIS — R76.8 POSITIVE ANA (ANTINUCLEAR ANTIBODY): ICD-10-CM

## 2025-08-21 DIAGNOSIS — R63.0 DECREASED APPETITE: ICD-10-CM

## 2025-08-21 DIAGNOSIS — E06.3 HYPOTHYROIDISM DUE TO HASHIMOTO'S THYROIDITIS: ICD-10-CM

## 2025-08-21 LAB — ERYTHROCYTE [SEDIMENTATION RATE] IN BLOOD BY WESTERGREN METHOD: 5 MM/HR (ref 0–20)

## 2025-08-21 RX ORDER — METOCLOPRAMIDE 5 MG/1
5 TABLET ORAL 3 TIMES DAILY PRN
Qty: 30 TABLET | Refills: 1 | Status: SHIPPED | OUTPATIENT
Start: 2025-08-21

## 2025-08-26 LAB
DAT POLY: NEGATIVE
SPECIMEN EXP DATE BLD: NORMAL

## 2025-08-27 ENCOUNTER — LAB (OUTPATIENT)
Dept: LAB | Facility: CLINIC | Age: 35
End: 2025-08-27
Payer: COMMERCIAL

## 2025-08-27 ENCOUNTER — OFFICE VISIT (OUTPATIENT)
Dept: RHEUMATOLOGY | Facility: CLINIC | Age: 35
End: 2025-08-27
Attending: STUDENT IN AN ORGANIZED HEALTH CARE EDUCATION/TRAINING PROGRAM
Payer: COMMERCIAL

## 2025-08-27 ENCOUNTER — MYC MEDICAL ADVICE (OUTPATIENT)
Dept: FAMILY MEDICINE | Facility: CLINIC | Age: 35
End: 2025-08-27

## 2025-08-27 VITALS
BODY MASS INDEX: 22.3 KG/M2 | WEIGHT: 121.9 LBS | OXYGEN SATURATION: 98 % | DIASTOLIC BLOOD PRESSURE: 74 MMHG | HEART RATE: 75 BPM | SYSTOLIC BLOOD PRESSURE: 109 MMHG

## 2025-08-27 DIAGNOSIS — R76.8 POSITIVE ANA (ANTINUCLEAR ANTIBODY): Primary | ICD-10-CM

## 2025-08-27 DIAGNOSIS — R76.8 POSITIVE ANA (ANTINUCLEAR ANTIBODY): ICD-10-CM

## 2025-08-27 LAB
ALBUMIN MFR UR ELPH: <6 MG/DL
ALBUMIN UR-MCNC: NEGATIVE MG/DL
APPEARANCE UR: CLEAR
BACTERIA #/AREA URNS HPF: ABNORMAL /HPF
BILIRUB UR QL STRIP: NEGATIVE
COLOR UR AUTO: YELLOW
CREAT UR-MCNC: 29.4 MG/DL
GLUCOSE UR STRIP-MCNC: NEGATIVE MG/DL
HGB UR QL STRIP: NEGATIVE
KETONES UR STRIP-MCNC: NEGATIVE MG/DL
LAB ORDER RESULT STATUS: NORMAL
LAB ORDER RESULT STATUS: NORMAL
LEUKOCYTE ESTERASE UR QL STRIP: ABNORMAL
Lab: NORMAL
Lab: NORMAL
NITRATE UR QL: NEGATIVE
PERFORMING LABORATORY: NORMAL
PERFORMING LABORATORY: NORMAL
PH UR STRIP: 7.5 [PH] (ref 5–7)
PROT/CREAT 24H UR: NORMAL MG/G{CREAT}
RBC #/AREA URNS AUTO: ABNORMAL /HPF
SP GR UR STRIP: 1.01 (ref 1–1.03)
SQUAMOUS #/AREA URNS AUTO: ABNORMAL /LPF
TEST NAME: NORMAL
TEST NAME: NORMAL
UROBILINOGEN UR STRIP-ACNC: 0.2 E.U./DL
WBC #/AREA URNS AUTO: ABNORMAL /HPF

## 2025-08-27 PROCEDURE — 99213 OFFICE O/P EST LOW 20 MIN: CPT | Performed by: STUDENT IN AN ORGANIZED HEALTH CARE EDUCATION/TRAINING PROGRAM

## 2025-08-27 PROCEDURE — 86376 MICROSOMAL ANTIBODY EACH: CPT

## 2025-08-27 ASSESSMENT — PAIN SCALES - GENERAL: PAINLEVEL_OUTOF10: MILD PAIN (2)

## 2025-08-28 LAB — THYROPEROXIDASE AB SERPL-ACNC: 18 IU/ML

## 2025-09-02 ENCOUNTER — E-VISIT (OUTPATIENT)
Dept: FAMILY MEDICINE | Facility: CLINIC | Age: 35
End: 2025-09-02
Payer: COMMERCIAL

## 2025-09-02 DIAGNOSIS — R11.0 NAUSEA: ICD-10-CM

## 2025-09-02 DIAGNOSIS — R63.0 DECREASED APPETITE: ICD-10-CM

## 2025-09-02 DIAGNOSIS — Z09 HOSPITAL DISCHARGE FOLLOW-UP: Primary | ICD-10-CM

## 2025-09-02 DIAGNOSIS — E06.3 HYPOTHYROIDISM DUE TO HASHIMOTO'S THYROIDITIS: ICD-10-CM

## 2025-09-02 PROCEDURE — 99207 PR NON-BILLABLE SERV PER CHARTING: CPT | Performed by: NURSE PRACTITIONER

## (undated) DEVICE — SUCTION CANISTER MEDIVAC LINER 3000ML W/LID 65651-530

## (undated) DEVICE — PAD CHUX UNDERPAD 30X36" P3036C

## (undated) DEVICE — PREP CHLORAPREP 26ML TINTED HI-LITE ORANGE 930815

## (undated) DEVICE — SU MONOCRYL 3-0 SH 27" Y316H

## (undated) DEVICE — ESU GROUND PAD ADULT W/CORD E7507

## (undated) DEVICE — SU MONOCRYL 4-0 PS-2 27" UND Y426H

## (undated) DEVICE — GLOVE PROTEXIS W/NEU-THERA 6.5  2D73TE65

## (undated) DEVICE — SU MONOCRYL 0 CTX 36" Y398H

## (undated) DEVICE — CATH TRAY FOLEY SURESTEP 16FR DRAIN BAG STATOCK A899916

## (undated) DEVICE — GLOVE PROTEXIS BLUE W/NEU-THERA 7.0  2D73EB70

## (undated) DEVICE — LINEN HALF SHEET 5512

## (undated) DEVICE — DRSG TEGADERM 4X10" 1627

## (undated) DEVICE — DRSG STERI STRIP 1/2X4" R1547

## (undated) DEVICE — GLOVE PROTEXIS MICRO 7.5  2D73PM75

## (undated) DEVICE — SOL ADH LIQUID BENZOIN SWAB 0.6ML C1544

## (undated) DEVICE — SOLUTION IRRIGATION 0.9% NACL 1000ML R5200-01

## (undated) DEVICE — SOL WATER IRRIG 1000ML BOTTLE 2F7114

## (undated) DEVICE — SOLIDIFIER FLUID RED 1500ML LIQUID KIT SYS POWDER ISOB1500

## (undated) DEVICE — SOL NACL 0.9% IRRIG 1000ML BOTTLE 2F7124

## (undated) DEVICE — LINEN FULL SHEET 5511

## (undated) DEVICE — LINEN TOWEL PACK X10 5473

## (undated) DEVICE — STPL SKIN SUBCUTICULAR INSORB  2030

## (undated) DEVICE — GLOVE BIOGEL PI ULTRATOUCH SZ 6.0 41160

## (undated) DEVICE — CAP BABY PINK/BLUE IC-2

## (undated) DEVICE — LINEN BABY BLANKET 5434

## (undated) DEVICE — PREP POVIDONE IODINE SOLUTION 10% 4OZ

## (undated) DEVICE — SU PDS II 0 CTX 60" Z990G

## (undated) DEVICE — PREP SKIN SCRUB TRAY 4461A

## (undated) DEVICE — BAG CLEAR TRASH 1.3M 39X33" P4040C

## (undated) DEVICE — LINEN DRAPE 54X72" 5467

## (undated) DEVICE — DRSG TELFA 3X8" 1238

## (undated) DEVICE — Device

## (undated) DEVICE — STOCKING SLEEVE VASOPRESS COMPRESSION CALF MED 18" VP501M

## (undated) DEVICE — SU MONOCRYL 1 CT-1 36" Y947H

## (undated) DEVICE — BARRIER SEPRAFILM 5X6" SINGLE SHEET 4301-02

## (undated) DEVICE — ESU PENCIL SMOKE EVAC W/ROCKER SWITCH 0703-047-000

## (undated) DEVICE — GLOVE PROTEXIS BLUE W/NEU-THERA 6.5  2D73EB65

## (undated) DEVICE — BLADE CLIPPER SGL USE 9680

## (undated) DEVICE — GLOVE PROTEXIS POWDER FREE SMT 6.0  2D72PT60X

## (undated) DEVICE — PACK C-SECTION LF PL15OTA83B

## (undated) DEVICE — GLOVE PROTEXIS MICRO 6.5  2D73PM65

## (undated) DEVICE — SU MONOCRYL 0 CT 36" Y358H

## (undated) DEVICE — SU MONOCRYL+ 3-0 SH 27IN MCP316H

## (undated) DEVICE — TRANSFER DEVICE BLOOD NDL HOLDER 364880

## (undated) DEVICE — STPL SKIN 35W 6.9MM  PXW35

## (undated) DEVICE — SU VICRYL 2-0 CT-1 36" J345H

## (undated) DEVICE — SOLUTION WATER 1000ML R5000-01

## (undated) DEVICE — SUTURE PDS 0 60IN CTX+ LOOPED PDP990G

## (undated) DEVICE — SU CHROMIC 1 54" TIE S115H

## (undated) DEVICE — GLOVE BIOGEL PI MICRO INDICATOR UNDERGLOVE SZ 6.5 48965

## (undated) DEVICE — SYR TRANSFER DEVICE BLOOD NDL HOLDER 3648800

## (undated) DEVICE — GLOVE PROTEXIS POWDER FREE SMT 6.5  2D72PT65X

## (undated) DEVICE — PREP CHLORAPREP 26ML TINTED ORANGE  260815

## (undated) DEVICE — BLADE CLIPPER DISP 4406

## (undated) DEVICE — SU PLAIN 3-0 CT 27" 852H

## (undated) RX ORDER — MORPHINE SULFATE 1 MG/ML
INJECTION, SOLUTION EPIDURAL; INTRATHECAL; INTRAVENOUS
Status: DISPENSED
Start: 2023-01-12

## (undated) RX ORDER — OXYTOCIN/0.9 % SODIUM CHLORIDE 30/500 ML
PLASTIC BAG, INJECTION (ML) INTRAVENOUS
Status: DISPENSED
Start: 2023-01-12

## (undated) RX ORDER — FENTANYL CITRATE 50 UG/ML
INJECTION, SOLUTION INTRAMUSCULAR; INTRAVENOUS
Status: DISPENSED
Start: 2020-12-01

## (undated) RX ORDER — FENTANYL CITRATE-0.9 % NACL/PF 10 MCG/ML
PLASTIC BAG, INJECTION (ML) INTRAVENOUS
Status: DISPENSED
Start: 2023-01-12

## (undated) RX ORDER — MORPHINE SULFATE 1 MG/ML
INJECTION, SOLUTION EPIDURAL; INTRATHECAL; INTRAVENOUS
Status: DISPENSED
Start: 2019-05-06

## (undated) RX ORDER — FENTANYL CITRATE-0.9 % NACL/PF 10 MCG/ML
PLASTIC BAG, INJECTION (ML) INTRAVENOUS
Status: DISPENSED
Start: 2025-04-24

## (undated) RX ORDER — FENTANYL CITRATE 50 UG/ML
INJECTION, SOLUTION INTRAMUSCULAR; INTRAVENOUS
Status: DISPENSED
Start: 2019-05-06

## (undated) RX ORDER — FENTANYL CITRATE 50 UG/ML
INJECTION, SOLUTION INTRAMUSCULAR; INTRAVENOUS
Status: DISPENSED
Start: 2025-04-24

## (undated) RX ORDER — FENTANYL CITRATE 50 UG/ML
INJECTION, SOLUTION INTRAMUSCULAR; INTRAVENOUS
Status: DISPENSED
Start: 2023-01-12

## (undated) RX ORDER — ONDANSETRON 2 MG/ML
INJECTION INTRAMUSCULAR; INTRAVENOUS
Status: DISPENSED
Start: 2023-01-12

## (undated) RX ORDER — KETOROLAC TROMETHAMINE 30 MG/ML
INJECTION, SOLUTION INTRAMUSCULAR; INTRAVENOUS
Status: DISPENSED
Start: 2025-04-24

## (undated) RX ORDER — PROPOFOL 10 MG/ML
INJECTION, EMULSION INTRAVENOUS
Status: DISPENSED
Start: 2019-05-06

## (undated) RX ORDER — KETOROLAC TROMETHAMINE 30 MG/ML
INJECTION, SOLUTION INTRAMUSCULAR; INTRAVENOUS
Status: DISPENSED
Start: 2023-01-12

## (undated) RX ORDER — OXYTOCIN/0.9 % SODIUM CHLORIDE 30/500 ML
PLASTIC BAG, INJECTION (ML) INTRAVENOUS
Status: DISPENSED
Start: 2019-05-06

## (undated) RX ORDER — ONDANSETRON 2 MG/ML
INJECTION INTRAMUSCULAR; INTRAVENOUS
Status: DISPENSED
Start: 2019-05-06

## (undated) RX ORDER — EPHEDRINE SULFATE 50 MG/ML
INJECTION, SOLUTION INTRAMUSCULAR; INTRAVENOUS; SUBCUTANEOUS
Status: DISPENSED
Start: 2020-12-01

## (undated) RX ORDER — ONDANSETRON 2 MG/ML
INJECTION INTRAMUSCULAR; INTRAVENOUS
Status: DISPENSED
Start: 2025-04-24

## (undated) RX ORDER — MORPHINE SULFATE 1 MG/ML
INJECTION, SOLUTION EPIDURAL; INTRATHECAL; INTRAVENOUS
Status: DISPENSED
Start: 2020-12-01

## (undated) RX ORDER — MORPHINE SULFATE 1 MG/ML
INJECTION, SOLUTION EPIDURAL; INTRATHECAL; INTRAVENOUS
Status: DISPENSED
Start: 2025-04-24

## (undated) RX ORDER — LIDOCAINE HYDROCHLORIDE 10 MG/ML
INJECTION, SOLUTION EPIDURAL; INFILTRATION; INTRACAUDAL; PERINEURAL
Status: DISPENSED
Start: 2019-05-06

## (undated) RX ORDER — FENTANYL CITRATE-0.9 % NACL/PF 10 MCG/ML
PLASTIC BAG, INJECTION (ML) INTRAVENOUS
Status: DISPENSED
Start: 2020-12-01

## (undated) RX ORDER — GLYCOPYRROLATE 0.2 MG/ML
INJECTION INTRAMUSCULAR; INTRAVENOUS
Status: DISPENSED
Start: 2019-05-06

## (undated) RX ORDER — DEXAMETHASONE SODIUM PHOSPHATE 4 MG/ML
INJECTION, SOLUTION INTRA-ARTICULAR; INTRALESIONAL; INTRAMUSCULAR; INTRAVENOUS; SOFT TISSUE
Status: DISPENSED
Start: 2023-01-12